# Patient Record
Sex: FEMALE | Race: WHITE | NOT HISPANIC OR LATINO | Employment: PART TIME | URBAN - METROPOLITAN AREA
[De-identification: names, ages, dates, MRNs, and addresses within clinical notes are randomized per-mention and may not be internally consistent; named-entity substitution may affect disease eponyms.]

---

## 2017-01-17 ENCOUNTER — ALLSCRIPTS OFFICE VISIT (OUTPATIENT)
Dept: OTHER | Facility: OTHER | Age: 52
End: 2017-01-17

## 2017-01-17 DIAGNOSIS — M76.60 ACHILLES TENDINITIS: ICD-10-CM

## 2017-01-17 DIAGNOSIS — M25.511 PAIN IN RIGHT SHOULDER: ICD-10-CM

## 2017-01-18 ENCOUNTER — TRANSCRIBE ORDERS (OUTPATIENT)
Dept: ADMINISTRATIVE | Facility: HOSPITAL | Age: 52
End: 2017-01-18

## 2017-01-18 ENCOUNTER — HOSPITAL ENCOUNTER (OUTPATIENT)
Dept: RADIOLOGY | Facility: MEDICAL CENTER | Age: 52
Discharge: HOME | End: 2017-01-18
Payer: COMMERCIAL

## 2017-01-18 DIAGNOSIS — M76.60 ACHILLES TENDINITIS: ICD-10-CM

## 2017-01-18 DIAGNOSIS — M25.511 PAIN IN RIGHT SHOULDER: ICD-10-CM

## 2017-01-18 PROCEDURE — 73030 X-RAY EXAM OF SHOULDER: CPT

## 2017-02-14 ENCOUNTER — APPOINTMENT (OUTPATIENT)
Dept: PHYSICAL THERAPY | Facility: MEDICAL CENTER | Age: 52
End: 2017-02-14
Payer: COMMERCIAL

## 2017-02-14 ENCOUNTER — ALLSCRIPTS OFFICE VISIT (OUTPATIENT)
Dept: OTHER | Facility: OTHER | Age: 52
End: 2017-02-14

## 2017-02-14 DIAGNOSIS — M25.511 PAIN IN RIGHT SHOULDER: ICD-10-CM

## 2017-02-14 DIAGNOSIS — M76.60 ACHILLES TENDINITIS: ICD-10-CM

## 2017-02-14 PROCEDURE — 97161 PT EVAL LOW COMPLEX 20 MIN: CPT

## 2017-02-21 ENCOUNTER — APPOINTMENT (OUTPATIENT)
Dept: PHYSICAL THERAPY | Facility: MEDICAL CENTER | Age: 52
End: 2017-02-21
Payer: COMMERCIAL

## 2017-02-21 PROCEDURE — 97110 THERAPEUTIC EXERCISES: CPT

## 2017-02-21 PROCEDURE — 97140 MANUAL THERAPY 1/> REGIONS: CPT

## 2017-02-22 ENCOUNTER — ALLSCRIPTS OFFICE VISIT (OUTPATIENT)
Dept: OTHER | Facility: OTHER | Age: 52
End: 2017-02-22

## 2017-02-23 ENCOUNTER — APPOINTMENT (OUTPATIENT)
Dept: PHYSICAL THERAPY | Facility: MEDICAL CENTER | Age: 52
End: 2017-02-23
Payer: COMMERCIAL

## 2017-02-23 PROCEDURE — 97110 THERAPEUTIC EXERCISES: CPT

## 2017-02-23 PROCEDURE — 97140 MANUAL THERAPY 1/> REGIONS: CPT

## 2017-02-28 ENCOUNTER — APPOINTMENT (OUTPATIENT)
Dept: PHYSICAL THERAPY | Facility: MEDICAL CENTER | Age: 52
End: 2017-02-28
Payer: COMMERCIAL

## 2017-02-28 PROCEDURE — 97110 THERAPEUTIC EXERCISES: CPT

## 2017-02-28 PROCEDURE — 97140 MANUAL THERAPY 1/> REGIONS: CPT

## 2017-03-02 ENCOUNTER — APPOINTMENT (OUTPATIENT)
Dept: PHYSICAL THERAPY | Facility: MEDICAL CENTER | Age: 52
End: 2017-03-02
Payer: COMMERCIAL

## 2017-03-02 PROCEDURE — 97140 MANUAL THERAPY 1/> REGIONS: CPT

## 2017-03-02 PROCEDURE — 97110 THERAPEUTIC EXERCISES: CPT

## 2017-03-07 ENCOUNTER — APPOINTMENT (OUTPATIENT)
Dept: PHYSICAL THERAPY | Facility: MEDICAL CENTER | Age: 52
End: 2017-03-07
Payer: COMMERCIAL

## 2017-03-07 PROCEDURE — 97110 THERAPEUTIC EXERCISES: CPT

## 2017-03-07 PROCEDURE — 97140 MANUAL THERAPY 1/> REGIONS: CPT

## 2017-03-09 ENCOUNTER — APPOINTMENT (OUTPATIENT)
Dept: PHYSICAL THERAPY | Facility: MEDICAL CENTER | Age: 52
End: 2017-03-09
Payer: COMMERCIAL

## 2017-03-09 PROCEDURE — 97140 MANUAL THERAPY 1/> REGIONS: CPT

## 2017-03-14 ENCOUNTER — APPOINTMENT (OUTPATIENT)
Dept: PHYSICAL THERAPY | Facility: MEDICAL CENTER | Age: 52
End: 2017-03-14
Payer: COMMERCIAL

## 2017-03-16 ENCOUNTER — APPOINTMENT (OUTPATIENT)
Dept: PHYSICAL THERAPY | Facility: MEDICAL CENTER | Age: 52
End: 2017-03-16
Payer: COMMERCIAL

## 2017-03-16 PROCEDURE — 97140 MANUAL THERAPY 1/> REGIONS: CPT

## 2017-03-16 PROCEDURE — 97110 THERAPEUTIC EXERCISES: CPT

## 2017-03-21 ENCOUNTER — APPOINTMENT (OUTPATIENT)
Dept: PHYSICAL THERAPY | Facility: MEDICAL CENTER | Age: 52
End: 2017-03-21
Payer: COMMERCIAL

## 2017-03-21 PROCEDURE — 97140 MANUAL THERAPY 1/> REGIONS: CPT

## 2017-03-21 PROCEDURE — 97110 THERAPEUTIC EXERCISES: CPT

## 2017-03-23 ENCOUNTER — APPOINTMENT (OUTPATIENT)
Dept: PHYSICAL THERAPY | Facility: MEDICAL CENTER | Age: 52
End: 2017-03-23
Payer: COMMERCIAL

## 2017-03-23 PROCEDURE — 97140 MANUAL THERAPY 1/> REGIONS: CPT

## 2017-03-23 PROCEDURE — 97110 THERAPEUTIC EXERCISES: CPT

## 2017-03-28 ENCOUNTER — APPOINTMENT (OUTPATIENT)
Dept: PHYSICAL THERAPY | Facility: MEDICAL CENTER | Age: 52
End: 2017-03-28
Payer: COMMERCIAL

## 2017-03-28 PROCEDURE — 97140 MANUAL THERAPY 1/> REGIONS: CPT

## 2017-03-28 PROCEDURE — 97110 THERAPEUTIC EXERCISES: CPT

## 2017-03-30 ENCOUNTER — APPOINTMENT (OUTPATIENT)
Dept: PHYSICAL THERAPY | Facility: MEDICAL CENTER | Age: 52
End: 2017-03-30
Payer: COMMERCIAL

## 2017-03-30 PROCEDURE — 97140 MANUAL THERAPY 1/> REGIONS: CPT

## 2017-03-30 PROCEDURE — 97110 THERAPEUTIC EXERCISES: CPT

## 2017-04-04 ENCOUNTER — ALLSCRIPTS OFFICE VISIT (OUTPATIENT)
Dept: OTHER | Facility: OTHER | Age: 52
End: 2017-04-04

## 2017-04-04 ENCOUNTER — TRANSCRIBE ORDERS (OUTPATIENT)
Dept: ADMINISTRATIVE | Facility: HOSPITAL | Age: 52
End: 2017-04-04

## 2017-04-04 DIAGNOSIS — M75.02 ADHESIVE BURSITIS OF LEFT SHOULDER: Primary | ICD-10-CM

## 2017-05-24 ENCOUNTER — ALLSCRIPTS OFFICE VISIT (OUTPATIENT)
Dept: OTHER | Facility: OTHER | Age: 52
End: 2017-05-24

## 2017-05-24 ENCOUNTER — TRANSCRIBE ORDERS (OUTPATIENT)
Dept: ADMINISTRATIVE | Facility: HOSPITAL | Age: 52
End: 2017-05-24

## 2017-05-24 DIAGNOSIS — M54.12 CERVICAL NEURALGIA: Primary | ICD-10-CM

## 2017-05-24 DIAGNOSIS — M54.12 RADICULOPATHY OF CERVICAL REGION: ICD-10-CM

## 2017-05-31 ENCOUNTER — HOSPITAL ENCOUNTER (OUTPATIENT)
Dept: MRI IMAGING | Facility: HOSPITAL | Age: 52
Discharge: HOME | End: 2017-05-31
Attending: ORTHOPAEDIC SURGERY
Payer: COMMERCIAL

## 2017-05-31 DIAGNOSIS — M54.12 CERVICAL NEURALGIA: ICD-10-CM

## 2017-05-31 PROCEDURE — 72141 MRI NECK SPINE W/O DYE: CPT

## 2017-06-07 ENCOUNTER — ALLSCRIPTS OFFICE VISIT (OUTPATIENT)
Dept: OTHER | Facility: OTHER | Age: 52
End: 2017-06-07

## 2017-08-09 ENCOUNTER — ALLSCRIPTS OFFICE VISIT (OUTPATIENT)
Dept: OTHER | Facility: OTHER | Age: 52
End: 2017-08-09

## 2017-09-05 ENCOUNTER — ALLSCRIPTS OFFICE VISIT (OUTPATIENT)
Dept: OTHER | Facility: OTHER | Age: 52
End: 2017-09-05

## 2017-09-20 ENCOUNTER — ALLSCRIPTS OFFICE VISIT (OUTPATIENT)
Dept: RADIOLOGY | Facility: MEDICAL CENTER | Age: 52
End: 2017-09-20
Payer: COMMERCIAL

## 2017-09-21 ENCOUNTER — GENERIC CONVERSION - ENCOUNTER (OUTPATIENT)
Dept: OTHER | Facility: OTHER | Age: 52
End: 2017-09-21

## 2017-09-29 ENCOUNTER — GENERIC CONVERSION - ENCOUNTER (OUTPATIENT)
Dept: OTHER | Facility: OTHER | Age: 52
End: 2017-09-29

## 2017-10-11 ENCOUNTER — GENERIC CONVERSION - ENCOUNTER (OUTPATIENT)
Dept: OTHER | Facility: OTHER | Age: 52
End: 2017-10-11

## 2017-10-11 DIAGNOSIS — M47.816 SPONDYLOSIS OF LUMBAR REGION WITHOUT MYELOPATHY OR RADICULOPATHY: ICD-10-CM

## 2017-10-11 DIAGNOSIS — M54.50 LOW BACK PAIN: ICD-10-CM

## 2017-10-24 ENCOUNTER — APPOINTMENT (OUTPATIENT)
Dept: PHYSICAL THERAPY | Facility: MEDICAL CENTER | Age: 52
End: 2017-10-24
Payer: COMMERCIAL

## 2017-10-24 ENCOUNTER — DOCTOR'S OFFICE (OUTPATIENT)
Dept: URBAN - METROPOLITAN AREA CLINIC 136 | Facility: CLINIC | Age: 52
Setting detail: OPHTHALMOLOGY
End: 2017-10-24
Payer: COMMERCIAL

## 2017-10-24 ENCOUNTER — GENERIC CONVERSION - ENCOUNTER (OUTPATIENT)
Dept: OTHER | Facility: OTHER | Age: 52
End: 2017-10-24

## 2017-10-24 ENCOUNTER — OPTICAL OFFICE (OUTPATIENT)
Dept: URBAN - METROPOLITAN AREA CLINIC 143 | Facility: CLINIC | Age: 52
Setting detail: OPHTHALMOLOGY
End: 2017-10-24
Payer: COMMERCIAL

## 2017-10-24 DIAGNOSIS — H52.4: ICD-10-CM

## 2017-10-24 DIAGNOSIS — M47.816 SPONDYLOSIS OF LUMBAR REGION WITHOUT MYELOPATHY OR RADICULOPATHY: ICD-10-CM

## 2017-10-24 DIAGNOSIS — H52.223: ICD-10-CM

## 2017-10-24 DIAGNOSIS — M54.50 LOW BACK PAIN: ICD-10-CM

## 2017-10-24 DIAGNOSIS — H52.03: ICD-10-CM

## 2017-10-24 PROCEDURE — 97162 PT EVAL MOD COMPLEX 30 MIN: CPT

## 2017-10-24 PROCEDURE — G8978 MOBILITY CURRENT STATUS: HCPCS

## 2017-10-24 PROCEDURE — 92004 COMPRE OPH EXAM NEW PT 1/>: CPT | Performed by: OPTOMETRIST

## 2017-10-24 PROCEDURE — V2744 TINT PHOTOCHROMATIC LENS/ES: HCPCS | Performed by: OPTOMETRIST

## 2017-10-24 PROCEDURE — V2781 PROGRESSIVE LENS PER LENS: HCPCS | Performed by: OPTOMETRIST

## 2017-10-24 PROCEDURE — V2020 VISION SVCS FRAMES PURCHASES: HCPCS | Performed by: OPTOMETRIST

## 2017-10-24 PROCEDURE — G8979 MOBILITY GOAL STATUS: HCPCS

## 2017-10-24 PROCEDURE — V2203 LENS SPHCYL BIFOCAL 4.00D/.1: HCPCS | Performed by: OPTOMETRIST

## 2017-10-24 ASSESSMENT — REFRACTION_MANIFEST
OS_VA1: 20/
OD_VA2: 20/
OS_VA3: 20/
OD_VA1: 20/
OS_VA2: 20/
OU_VA: 20/
OD_VA2: 20/
OU_VA: 20/
OD_VA1: 20/
OS_VA3: 20/
OS_VA2: 20/
OD_VA3: 20/
OD_VA3: 20/
OS_VA1: 20/

## 2017-10-24 ASSESSMENT — REFRACTION_OUTSIDERX
OS_CYLINDER: -0.50
OS_SPHERE: +1.00
OD_AXIS: 090
OD_ADD: +2.50
OS_VA2: 20/40
OS_VA1: 20/40-2
OS_VA3: 20/
OD_VA3: 20/
OU_VA: 20/30-1
OD_CYLINDER: -0.50
OD_VA2: 20/40
OD_VA1: 20/30-1
OS_ADD: +2.50
OD_SPHERE: +0.75
OS_AXIS: 105

## 2017-10-24 ASSESSMENT — KERATOMETRY
OS_K1POWER_DIOPTERS: 46.75
OS_K2POWER_DIOPTERS: 47.00
OD_K1POWER_DIOPTERS: 46.50
OD_K2POWER_DIOPTERS: 47.25
OD_AXISANGLE_DEGREES: 105
OS_AXISANGLE_DEGREES: 158

## 2017-10-24 ASSESSMENT — SPHEQUIV_DERIVED
OS_SPHEQUIV: 0.625
OD_SPHEQUIV: 0.5

## 2017-10-24 ASSESSMENT — REFRACTION_AUTOREFRACTION
OS_AXIS: 107
OD_SPHERE: +0.75
OS_CYLINDER: -0.75
OD_AXIS: 087
OS_SPHERE: +1.00
OD_CYLINDER: -0.50

## 2017-10-24 ASSESSMENT — REFRACTION_CURRENTRX
OS_OVR_VA: 20/
OD_OVR_VA: 20/
OD_OVR_VA: 20/
OS_OVR_VA: 20/
OD_OVR_VA: 20/
OS_OVR_VA: 20/

## 2017-10-24 ASSESSMENT — CONFRONTATIONAL VISUAL FIELD TEST (CVF)
OD_FINDINGS: FULL
OS_FINDINGS: FULL

## 2017-10-24 ASSESSMENT — AXIALLENGTH_DERIVED
OS_AL: 22.1965
OD_AL: 22.2397

## 2017-10-24 ASSESSMENT — VISUAL ACUITY
OS_BCVA: 20/50
OD_BCVA: 20/100

## 2017-10-25 NOTE — CONSULTS
Assessment  Assessed    1  Acute cervical radiculopathy (723 4) (M54 12)    Plan  Abnormal blood chemistry, Acute cervical radiculopathy    · Cervical Interlaminar Epidural Steroid Injection; Status:Active; Requested RCR:[id number];     Perform:[facility]; Order Comments:Right EHSAN; TVU:[id number]; Ordered; For:Abnormal blood chemistry, Acute cervical radiculopathy; Ordered By:Ivan Pruitt; Discussion/Summary    Plan:we'll schedule patient for a right C7-T1 interlaminar epidural steroid injection to decrease the inflammatory component of her pain which is in the neck and radiating into the right arm   risks and benefits including bleeding, infection, tissue reaction, allergic reaction were discussed  Verbal and written consent were obtained  follow-up as needed, continue with plan to follow up with Dr Zaria Ziegler  Chief Complaint  Chief Complaints    1  Neck Pain  neck and right arm pain      History of Present Illness  Ms Dejesus is a pleasant 39-year-old female sent from Dr Zaria Ziegler for consideration of cervical epidural steroid injection for her neck and radiating arm pain consistent with acute cervical radiculitis  is been going on for number months or her without any inciting event or trauma and ranges from moderate to severe intensity rated as an 8-10/10  This is nearly constant without any typical pattern  factors include coughing and sneezing  Alleviating factors include prayer  has tried chiropractic manipulation without relief   studies include MRI of the cervical spine  This does reveal some degenerative changes which may be accounting for her symptoms  denies any allergies to contrast dyes, local anesthetics, steroid preparations, or latex  The patient is not taking any anticoagulant medications  No contraindications to pursuing injection therapies    have personally reviewed and/or updated the patient's past medical history, past surgical history, family history, social history, current medications, allergies, and vital signs today  Referring physician is  Dr Guero Graff   Primary Care physician is  Dr Nica Gallegos presents with complaints of gradual onset of constant episodes of severe right lateral neck pain, described as dull and aching, radiating to the right trapezius, right shoulder and right arm  On a scale of 1 to 10, the patient rates the pain as 9  Review of Systems    Constitutional: no fever,-- no recent weight gain-- and-- no recent weight loss  Eyes: no double vision-- and-- no blurry vision  Cardiovascular: no chest pain,-- no palpitations-- and-- no lower extremity edema  Respiratory: no complaints of shortness of breath-- and-- no wheezing  Musculoskeletal: no difficulty walking,-- no muscle weakness,-- no joint stiffness,-- no joint swelling,-- no limb swelling,-- no pain in extremity-- and-- no decreased range of motion  Neurological: no dizziness,-- no difficulty swallowing,-- no memory loss,-- no loss of consciousness-- and-- no seizures  Gastrointestinal: no nausea,-- no vomiting,-- no constipation-- and-- no diarrhea  Genitourinary: no difficulty initiating urine stream,-- no genital pain-- and-- no frequent urination  Integumentary: no complaints of skin rash  Psychiatric: no depression  Endocrine: no excessive thirst,-- no adrenal disease,-- no hypothyroidism-- and-- no hyperthyroidism  Hematologic/Lymphatic: no tendency for easy bruising-- and-- no tendency for easy bleeding  Active Problems  Problems    1  Abnormal blood chemistry (790 6) (R79 9)   2  Achilles tendon pain (727 89) (M76 60)   3  Acute cervical radiculopathy (723 4) (M54 12)   4  Adhesive capsulitis of left shoulder (726 0) (M75 02)   5  Ankle pain, unspecified laterality   6  Burn of hand (944 00) (T23 009A)   7  Cellulitis of left axilla (682 3) (L03 112)   8  Cervical neuralgia (723 4) (M54 12)   9  Chest pain (786 50) (R07 9)   10   Closed Fracture Of The 12th Rib (807 00)   11  Cough (786 2) (R05)   12  Ecchymoses, spontaneous (782 7) (R23 3)   13  Encounter for routine gynecological examination with Papanicolaou smear of cervix    (V72 31,V76 2) (Z01 419)   14  Encounter for screening mammogram for malignant neoplasm of breast (V76 12)    (Z12 31)   15  Epilepsy And Recurrent Seizures (345 90)   16  Esophageal reflux (530 81) (K21 9)   17  Fatigue (780 79) (R53 83)   18  Foot Sprain (845 10)   19  Gastritis (535 50) (K29 70)   20  History of allergy (V15 09) (Z88 9)   21  Hyperlipidemia (272 4) (E78 5)   22  Knee pain (719 46) (M25 569)   23  Laceration of right hand, initial encounter (882 0) (S61 411A)   24  Lateral epicondylitis of left elbow (726 32) (M77 12)   25  Lower back pain (724 2) (M54 5)   26  PPD screening test (V74 1) (Z11 1)   27  Right shoulder pain (719 41) (M25 511)   28  Seizures (780 39) (R56 9)   29  Spondylosis of cervical region without myelopathy or radiculopathy (721 0) (M47 812)   30  Tendinitis of left rotator cuff (726 10) (M75 82)   31  Thumb laceration (883 0) (S61 019A)   32  Trigger middle finger of left hand (727 03) (M65 332)   33  URTI (acute upper respiratory infection) (465 9) (J06 9)   34  Viral labyrinthitis of left ear (386 35) (H83 02)   35  Viral labyrinthitis of right ear (386 35) (H83 01)   36  Visit for suture removal (V58 32) (Z48 02)    Past Medical History  Problems    1  History of Ankle Joint Swelling   2  History of Epilepsy And Recurrent Seizures (345 90)   3  History of abdominal pain (V13 89) (Z87 898)   4  History of acute bronchitis (V12 69) (Z87 09)   5  History of acute sinusitis (V12 69) (Z87 09)   6  History of anemia (V12 3) (Z86 2)   7  History of chest pain (V13 89) (Z87 898)   8  History of constipation (V12 79) (Z87 19)   9  History of diarrhea (V12 79) (Z87 898)   10  History of headache (V13 89) (Z87 898)   11  History of low back pain (V13 59) (Z87 39)   12   History of sexually transmitted disease (V12 09) (Z86 19)   13  History of shortness of breath (V13 89) (Z87 898)   14  History of vertigo (V12 49) (Z87 898)   15  History of Pneumonia (V12 61)    Surgical History  Problems    1  History of Cholecystectomy Laparoscopic   2  History of Cholecystotomy   3  History of Surgical Treatment For    4  History of Tubal Ligation   5  History of Tubal Ligation    Family History  Mother    1  Family history of Carcinoma In Situ   2  Family history of Diabetes Mellitus (V18 0)   3  Family history of Hypertension (V17 49)   4  Family history of Stroke Syndrome (V17 1)  Family History    5  Family history of Brain Cancer (V16 8)    Social History  Problems    · Former smoker (T33 61) (J19 617)   · Lives with significant other   · Never A Smoker   · No alcohol use   · No illicit drug use   ·  (V61 03) (Z63 5)   · Working Full Time    Current Meds   1  B Complex Oral Capsule; take 1 capsule daily; Therapy: 47UDD0291 to Recorded   2  Lecithin Granules; USE AS DIRECTED; Therapy: 39UDB6024 to Recorded   3  MethylPREDNISolone Acetate 40 MG/ML Injection Suspension; bursa injection; To Be   Done: 65WHI0897; Status: HOLD FOR - Administration Ordered   4  Vitamin E 1000 UNIT Oral Capsule; take 1 capsule daily; Therapy: 40QCJ3803 to Recorded    Allergies  Medication    1  Codeine Derivatives   2  Penicillins  Non-Medication    3  Milk   4  No Known Environmental Allergies    Vitals  Vital Signs    Recorded: 2017 09:19AM   Heart Rate 64   Respiration 12   Systolic 045   Diastolic 62   Height 4 ft 11 in   Weight 194 lb    BMI Calculated 39 18   BSA Calculated 1 82   Pain Scale 9     Physical Exam    Constitutional   General appearance: Well developed, well nourished, alert, in no distress, non-toxic and no overt pain behavior  Eyes   Sclera: anicteric   HEENT   Hearing grossly intact  Pulmonary   Respiratory effort: Even and unlabored        Cardiovascular   Examination of extremities: No edema or pitting edema present  Skin   Skin and subcutaneous tissue: Normal without rashes or lesions, well hydrated  Psychiatric   Mood and affect: Mood and affect appropriate  Neurologic   Cranial nerves: Cranial nerves II-XII grossly intact  Musculoskeletal   Gait and station: Normal     Cervical Spine examination demonstrates Cervical Spine:   Tenderness: right paraspinal tenderness-- and-- right trapezius muscle  Flexion was restricted-- and-- was painful  Extension was restricted-- and-- was painful  Left lateral flexion was restricted-- and-- was painful  Right lateral flexion was restricted-- and-- was painful  Rotation to the left was restricted-- and-- was painful  Rotation to the right was restricted-- and-- was painful    hand strength was normal bilaterally  wrist strength was normal bilaterally  elbow strength was normal bilaterally  shoulder strength was normal bilaterally  Evaluation of Muscle Stretch Reflexes on the right side demonstrates 1/4 Triceps Reflex,-- 1/4 Biceps Reflex-- and-- 1/4 Brachioradialis Reflex  Evaluation of Muscle Stretch Reflexes on the left side demonstrates 1/4 Triceps Reflex,-- 1/4 Biceps Reflex-- and-- 1/4 Brachioradialis Reflex  Special Tests: negative Spurling's Maneuver to the right  Results/Data  Procedure Flowsheet [id number] 09:30AM      Test Name Result Flag Reference   Neck Disability Index Score 54         Results    I personally reviewed the films/images in the office today  Radiology:  RIGHT SHOULDER    INDICATION: Right shoulder pain  COMPARISON: None    VIEWS: 3; 3 images    FINDINGS:    There is no acute fracture or dislocation  No degenerative changes  No lytic or blastic lesions are seen  Soft tissues are unremarkable  IMPRESSION:    No acute osseous abnormality  MRI:   MRI CERVICAL SPINE WITHOUT CONTRAST    INDICATION: 723 4 m54 12 History: severe rt shoulder pain, weakness and decreased range of motion    COMPARISON: CT 9/8/2016    TECHNIQUE: Sagittal T1, sagittal T2, sagittal inversion recovery, axial T2, axial 2D merge    IMAGE QUALITY: Diagnostic    FINDINGS:    ALIGNMENT: There is nonspecific straightening of the cervical lordosis without subluxation  MARROW SIGNAL: Scattered degenerative endplate changes  No focally suspicious marrow lesions  No bone marrow edema or compression abnormality  CERVICAL AND VISUALIZED THORACIC CORD: Normal signal within the visualized cord  PREVERTEBRAL AND PARASPINAL SOFT TISSUES: Prevertebral and paraspinal soft tissues are unremarkable  VISUALIZED POSTERIOR FOSSA: The visualized posterior fossa demonstrates no abnormal signal     CERVICAL DISC SPACES:    C2-C3: Normal     C3-C4: Normal     C4-C5: There is a diffuse disk bulge  No significant central canal or neural foraminal narrowing  C5-C6: There is a central disc herniation, protrusion type  There is mild central canal right neural foraminal narrowing  Left neural foramen patent  C6-C7: There is a left paracentral disc herniation, protrusion type  Mild central canal narrowing  Neural foramina bilaterally patent  C7-T1: Normal     UPPER THORACIC DISC SPACES: Normal       IMPRESSION:      1  There is nonspecific straightening of the cervical lordosis without subluxation  * MRI CERVICAL SPINE WO CONTRAST [id number] 10:22PM Elise Rosado     Test Name Result Flag Reference   MRI CERVICAL SPINE WO CONTRAST (Report)     MRI CERVICAL SPINE WITHOUT CONTRAST     INDICATION: 723 4 m54 12 History: severe rt shoulder pain, weakness and decreased range of motion     COMPARISON: CT 9/8/2016     TECHNIQUE: Sagittal T1, sagittal T2, sagittal inversion recovery, axial T2, axial 2D merge        IMAGE QUALITY: Diagnostic     FINDINGS:     ALIGNMENT: There is nonspecific straightening of the cervical lordosis without subluxation  MARROW SIGNAL: Scattered degenerative endplate changes   No focally suspicious marrow lesions  No bone marrow edema or compression abnormality  CERVICAL AND VISUALIZED THORACIC CORD: Normal signal within the visualized cord  PREVERTEBRAL AND PARASPINAL SOFT TISSUES: Prevertebral and paraspinal soft tissues are unremarkable  VISUALIZED POSTERIOR FOSSA: The visualized posterior fossa demonstrates no abnormal signal      CERVICAL DISC SPACES:        C2-C3: Normal      C3-C4: Normal      C4-C5: There is a diffuse disk bulge  No significant central canal or neural foraminal narrowing  C5-C6: There is a central disc herniation, protrusion type  There is mild central canal right neural foraminal narrowing  Left neural foramen patent  C6-C7: There is a left paracentral disc herniation, protrusion type  Mild central canal narrowing  Neural foramina bilaterally patent  C7-T1: Normal      UPPER THORACIC DISC SPACES: Normal        IMPRESSION:       1  There is nonspecific straightening of the cervical lordosis without subluxation  2  Mild spondylosis  Workstation performed: [id number]     Signed by: Mayra Leahy MD   6/1/17     Future Appointments    Date/Time Provider Specialty Site   10/11/2017 09:10 AM Byron Kawasaki, M D   Orthopedic Surgery [facility]     Signatures   Electronically signed by : Heather Burt DO; Sep  5 2017  9:55AM EST                       (Author)

## 2017-10-31 ENCOUNTER — APPOINTMENT (OUTPATIENT)
Dept: PHYSICAL THERAPY | Facility: MEDICAL CENTER | Age: 52
End: 2017-10-31
Payer: COMMERCIAL

## 2017-11-03 ENCOUNTER — APPOINTMENT (OUTPATIENT)
Dept: PHYSICAL THERAPY | Facility: MEDICAL CENTER | Age: 52
End: 2017-11-03
Payer: COMMERCIAL

## 2017-11-07 ENCOUNTER — APPOINTMENT (OUTPATIENT)
Dept: PHYSICAL THERAPY | Facility: MEDICAL CENTER | Age: 52
End: 2017-11-07
Payer: COMMERCIAL

## 2017-11-07 PROCEDURE — 97110 THERAPEUTIC EXERCISES: CPT

## 2017-11-09 ENCOUNTER — APPOINTMENT (OUTPATIENT)
Dept: PHYSICAL THERAPY | Facility: MEDICAL CENTER | Age: 52
End: 2017-11-09
Payer: COMMERCIAL

## 2017-11-09 PROCEDURE — 97110 THERAPEUTIC EXERCISES: CPT

## 2017-11-09 PROCEDURE — 97140 MANUAL THERAPY 1/> REGIONS: CPT

## 2017-11-14 ENCOUNTER — APPOINTMENT (OUTPATIENT)
Dept: PHYSICAL THERAPY | Facility: MEDICAL CENTER | Age: 52
End: 2017-11-14
Payer: COMMERCIAL

## 2017-11-14 ENCOUNTER — ALLSCRIPTS OFFICE VISIT (OUTPATIENT)
Dept: OTHER | Facility: OTHER | Age: 52
End: 2017-11-14

## 2017-11-14 PROCEDURE — 97110 THERAPEUTIC EXERCISES: CPT

## 2017-11-15 NOTE — PROGRESS NOTES
Assessment    1  Myofascial pain syndrome (729 1) (M79 1)    Plan  Adhesive capsulitis of left shoulder    · MethylPREDNISolone Acetate 40 MG/ML Injection Suspension; bursainjection   Rx By: Diego oRjas; Adhesive capsulitis of left shoulder; FROYLAN = N; Request Administration; Last Updated By: Abilio Sharma; 11/13/2017 11:23:27 AM  Myofascial pain syndrome    · 2 - Sandhya Bianchi MD, Shruthi Dorsey  (Rheumatology) Co-Management  *  Status: Hold For -Scheduling  Requested for: [id number]   Ordered; For: Myofascial pain syndrome; Ordered By: Sara Nieves Performed:  Due: 14ZEG0238  Care Summary provided  : Yes    Discussion/Summary    At this time we will refer the patient to Rheumatology for evaluation refer widespread myofascial pain complaints  I want to ensure that she does not have some other type of diagnosis to account for her symptoms other than fibromyalgia  She is welcome to return to our office for further interventional care if needed  Chief Complaint    1  Back Pain  widespread myofascial pain      History of Present Illness  Ms Dejesus returns for further evaluation of her widespread pain complaints  She does have some significant myofascial pain in the upper and lower regions of her body  She is describing intermittent burning, aching, throbbing, pressure-like, numbness type pain  she does not have any focal pain that would respond to any interventional approaches  She did have some significant improvement with epidural steroid injections for her neck and arm pain   have personally reviewed and/or updated the patient's past medical history, past surgical history, family history, social history, current medications, allergies, and vital signs today  Osman Boogie presents with complaints of gradual onset of moderate left > right and bilateral lower back pain, described as dull, aching, burning and throbbing, radiating to the left buttock and left thigh  On a scale of 1 to 10, the patient rates the pain as 7  Symptoms are unchanged  Review of Systems   Constitutional: no fever,-- no recent weight gain-- and-- no recent weight loss  Eyes: no double vision-- and-- no blurry vision  Cardiovascular: no chest pain,-- no palpitations-- and-- no lower extremity edema  Respiratory: no complaints of shortness of breath-- and-- no wheezing  Musculoskeletal: no difficulty walking,-- no muscle weakness,-- no joint stiffness,-- no joint swelling,-- no limb swelling,-- no pain in extremity-- and-- no decreased range of motion  Neurological: seizures, but-- no dizziness,-- no difficulty swallowing,-- no memory loss-- and-- no loss of consciousness  Gastrointestinal: no nausea,-- no vomiting,-- no constipation-- and-- no diarrhea  Genitourinary: no difficulty initiating urine stream,-- no genital pain-- and-- no frequent urination  Integumentary: no complaints of skin rash  Psychiatric: no depression  Endocrine: no excessive thirst,-- no adrenal disease,-- no hypothyroidism-- and-- no hyperthyroidism  Hematologic/Lymphatic: no tendency for easy bruising-- and-- no tendency for easy bleeding  Active Problems    1  Abnormal blood chemistry (790 6) (R79 9)   2  Achilles tendon pain (727 89) (M76 60)   3  Acute cervical radiculopathy (723 4) (M54 12)   4  Adhesive capsulitis of left shoulder (726 0) (M75 02)   5  Ankle pain, unspecified laterality   6  Burn of hand (944 00) (T23 009A)   7  Cellulitis of left axilla (682 3) (L03 112)   8  Cervical neuralgia (723 4) (M54 12)   9  Chest pain (786 50) (R07 9)   10  Chronic right sacroiliac joint pain (724 6,338 29) (M53 3,G89 29)   11  Closed Fracture Of The 12th Rib (807 00)   12  Cough (786 2) (R05)   13  Ecchymoses, spontaneous (782 7) (R23 3)   14  Encounter for routine gynecological examination with Papanicolaou smear of cervix  (V72 31,V76 2) (Z01 419)   15   Encounter for screening mammogram for malignant neoplasm of breast (V76 12)  (Z12 31)   16  Epilepsy And Recurrent Seizures (345 90)   17  Esophageal reflux (530 81) (K21 9)   18  Fatigue (780 79) (R53 83)   19  Foot Sprain (845 10)   20  Gastritis (535 50) (K29 70)   21  History of allergy (V15 09) (Z88 9)   22  Hyperlipidemia (272 4) (E78 5)   23  Knee pain (719 46) (M25 569)   24  Laceration of right hand, initial encounter (882 0) (S61 411A)   25  Lateral epicondylitis of left elbow (726 32) (M77 12)   26  Lower back pain (724 2) (M54 5)   27  Lumbar spondylosis (721 3) (M47 816)   28  PPD screening test (V74 1) (Z11 1)   29  Right shoulder pain (719 41) (M25 511)   30  Seizures (780 39) (R56 9)   31  Spondylosis of cervical region without myelopathy or radiculopathy (721 0) (M47 812)   32  Tendinitis of left rotator cuff (726 10) (M75 82)   33  Thumb laceration (883 0) (S61 019A)   34  Trigger middle finger of left hand (727 03) (M65 332)   35  URTI (acute upper respiratory infection) (465 9) (J06 9)   36  Viral labyrinthitis of left ear (386 35) (H83 02)   37  Viral labyrinthitis of right ear (386 35) (H83 01)   38  Visit for suture removal (V58 32) (Z48 02)    Past Medical History  1  History of Ankle Joint Swelling   2  History of Epilepsy And Recurrent Seizures (345 90)   3  History of abdominal pain (V13 89) (Z87 898)   4  History of acute bronchitis (V12 69) (Z87 09)   5  History of acute sinusitis (V12 69) (Z87 09)   6  History of anemia (V12 3) (Z86 2)   7  History of chest pain (V13 89) (Z87 898)   8  History of constipation (V12 79) (Z87 19)   9  History of diarrhea (V12 79) (Z87 898)   10  History of headache (V13 89) (Z87 898)   11  History of low back pain (V13 59) (Z87 39)   12  History of sexually transmitted disease (V12 09) (Z86 19)   13  History of shortness of breath (V13 89) (Z87 898)   14  History of vertigo (V12 49) (Z87 898)   15  History of Pneumonia (V12 61)    Surgical History    1  History of Cholecystectomy Laparoscopic   2  History of Cholecystotomy   3   History of Surgical Treatment For    4  History of Tubal Ligation   5  History of Tubal Ligation    Family History  Mother    1  Family history of Carcinoma In Situ   2  Family history of Diabetes Mellitus (V18 0)   3  Family history of Hypertension (V17 49)   4  Family history of Stroke Syndrome (V17 1)  Family History    5  Family history of Brain Cancer (V16 8)    Social History     · Former smoker (V09 75) (J08 491)   · Lives with significant other   · Never A Smoker   · No alcohol use   · No illicit drug use   ·  (V61 03) (Z63 5)   · Working Full Time    Current Meds   1  B Complex Oral Capsule; take 1 capsule daily; Therapy: 66TBY4219 to Recorded   2  Lecithin Granules; USE AS DIRECTED; Therapy: 85KGE3139 to Recorded   3  MethylPREDNISolone Acetate 40 MG/ML Injection Suspension; bursa injection; To Be Done: 61QFX3330; Status: HOLD FOR - Administration Ordered   4  Topamax 100 MG Oral Tablet; Therapy: 73YPP0402 to Recorded   5  Vitamin E 1000 UNIT Oral Capsule; take 1 capsule daily; Therapy: 98TIG8256 to Recorded    Allergies  1  Codeine Derivatives   2  Penicillins  3  Milk   4  No Known Environmental Allergies    Vitals  Vital Signs    Recorded: 38FFO0382 02:40PM   Heart Rate 64   Respiration 12   Systolic 154   Diastolic 62   Height 4 ft 11 in   Weight 194 lb    BMI Calculated 39 18   BSA Calculated 1 82   Pain Scale 7       Physical Exam   Constitutional  General appearance: Well developed, well nourished, alert, in no distress, non-toxic and no overt pain behavior  Eyes  Sclera: anicteric  HEENT  Hearing grossly intact  Pulmonary  Respiratory effort: Even and unlabored  Psychiatric  Mood and affect: Mood and affect appropriate  Neurologic  Cranial nerves: Cranial nerves II-XII grossly intact     Musculoskeletal  Gait and station: Normal  -- widespread myofascial pain is demonstrated on exam of the upper extremities, lower extremities, and throughout the paraspinal musculature from the cervical to sacral regions        Signatures   Electronically signed by : Reg Lopez DO; Nov 14 2017  3:04PM EST                       (Author)

## 2018-01-02 ENCOUNTER — ALLSCRIPTS OFFICE VISIT (OUTPATIENT)
Dept: OTHER | Facility: OTHER | Age: 53
End: 2018-01-02

## 2018-01-02 ENCOUNTER — APPOINTMENT (OUTPATIENT)
Dept: LAB | Facility: HOSPITAL | Age: 53
End: 2018-01-02
Payer: COMMERCIAL

## 2018-01-02 DIAGNOSIS — J01.10 ACUTE FRONTAL SINUSITIS: ICD-10-CM

## 2018-01-02 DIAGNOSIS — R32 URINARY INCONTINENCE: ICD-10-CM

## 2018-01-02 DIAGNOSIS — R30.0 DYSURIA: ICD-10-CM

## 2018-01-02 LAB
BACTERIA UR QL AUTO: NORMAL /HPF
BILIRUB UR QL STRIP: NEGATIVE
BILIRUB UR QL STRIP: NORMAL
CLARITY UR: CLEAR
CLARITY UR: NORMAL
COLOR UR: CLEAR
COLOR UR: YELLOW
GLUCOSE (HISTORICAL): NORMAL
GLUCOSE UR STRIP-MCNC: NEGATIVE MG/DL
HGB UR QL STRIP.AUTO: NEGATIVE
HGB UR QL STRIP.AUTO: NORMAL
HYALINE CASTS #/AREA URNS LPF: NORMAL /LPF
KETONES UR STRIP-MCNC: NEGATIVE MG/DL
KETONES UR STRIP-MCNC: NORMAL MG/DL
LEUKOCYTE ESTERASE UR QL STRIP: ABNORMAL
LEUKOCYTE ESTERASE UR QL STRIP: NORMAL
NITRITE UR QL STRIP: NEGATIVE
NITRITE UR QL STRIP: NORMAL
NON-SQ EPI CELLS URNS QL MICRO: NORMAL /HPF
PH UR STRIP.AUTO: 6 [PH]
PH UR STRIP.AUTO: 6.5 [PH] (ref 4.5–8)
PROT UR STRIP-MCNC: NEGATIVE MG/DL
PROT UR STRIP-MCNC: NORMAL MG/DL
RBC #/AREA URNS AUTO: NORMAL /HPF
SP GR UR STRIP.AUTO: 1.01
SP GR UR STRIP.AUTO: 1.01 (ref 1–1.03)
UROBILINOGEN UR QL STRIP.AUTO: 0.2 E.U./DL
UROBILINOGEN UR QL STRIP.AUTO: NORMAL
WBC #/AREA URNS AUTO: NORMAL /HPF

## 2018-01-02 PROCEDURE — 87086 URINE CULTURE/COLONY COUNT: CPT

## 2018-01-02 PROCEDURE — 81001 URINALYSIS AUTO W/SCOPE: CPT

## 2018-01-03 ENCOUNTER — GENERIC CONVERSION - ENCOUNTER (OUTPATIENT)
Dept: OTHER | Facility: OTHER | Age: 53
End: 2018-01-03

## 2018-01-03 LAB
BACTERIA UR CULT: ABNORMAL
BACTERIA UR CULT: ABNORMAL

## 2018-01-04 ENCOUNTER — GENERIC CONVERSION - ENCOUNTER (OUTPATIENT)
Dept: OTHER | Facility: OTHER | Age: 53
End: 2018-01-04

## 2018-01-10 NOTE — MISCELLANEOUS
Message   Recorded as Task   Date: 09/20/2017 03:42 PM, Created By: Yoan Mena   Task Name: Miscellaneous   Assigned To: [id number] [address] clinical,Team   Regarding Patient: Maurice Horn, Status: Active   Comment:    Marya Hagen - 20 Sep 2017 3:42 PM     TASK CREATED  pt had procedure done today and requesting to speak with nurse  please call pt back at [phone]   Marta Juan - 20 Sep 2017 4:19 PM     TASK EDITED  *****Lisa Best**********    S/W pt  Pt stated has a slight headache and feels a little dizzy  Pt denies any fever, or nausea  Advised pt to use some ice, rest and take whatever she normally takes for pain  Christy Abdullahi - 20 Sep 2017 4:48 PM     TASK REPLIED TO: Previously Assigned To Christy Abdullahi             aware, agree with Mann Guadarrama - 21 Sep 2017 8:18 AM     TASK EDITED        Active Problems    1  Abnormal blood chemistry (790 6) (R79 9)   2  Achilles tendon pain (727 89) (M76 60)   3  Acute cervical radiculopathy (723 4) (M54 12)   4  Adhesive capsulitis of left shoulder (726 0) (M75 02)   5  Ankle pain, unspecified laterality   6  Burn of hand (944 00) (T23 009A)   7  Cellulitis of left axilla (682 3) (L03 112)   8  Cervical neuralgia (723 4) (M54 12)   9  Chest pain (786 50) (R07 9)   10  Closed Fracture Of The 12th Rib (807 00)   11  Cough (786 2) (R05)   12  Ecchymoses, spontaneous (782 7) (R23 3)   13  Encounter for routine gynecological examination with Papanicolaou smear of cervix    (V72 31,V76 2) (Z01 419)   14  Encounter for screening mammogram for malignant neoplasm of breast (V76 12)    (Z12 31)   15  Epilepsy And Recurrent Seizures (345 90)   16  Esophageal reflux (530 81) (K21 9)   17  Fatigue (780 79) (R53 83)   18  Foot Sprain (845 10)   19  Gastritis (535 50) (K29 70)   20  History of allergy (V15 09) (Z88 9)   21  Hyperlipidemia (272 4) (E78 5)   22  Knee pain (719 46) (M20 569)   23  Laceration of right hand, initial encounter (882 0) (S61 774A)   24   Lateral epicondylitis of left elbow (726 32) (M77 12)   25  Lower back pain (724 2) (M54 5)   26  PPD screening test (V74 1) (Z11 1)   27  Right shoulder pain (719 41) (M25 511)   28  Seizures (780 39) (R56 9)   29  Spondylosis of cervical region without myelopathy or radiculopathy (721 0) (M47 812)   30  Tendinitis of left rotator cuff (726 10) (M75 82)   31  Thumb laceration (883 0) (S61 019A)   32  Trigger middle finger of left hand (727 03) (M65 332)   33  URTI (acute upper respiratory infection) (465 9) (J06 9)   34  Viral labyrinthitis of left ear (386 35) (H83 02)   35  Viral labyrinthitis of right ear (386 35) (H83 01)   36  Visit for suture removal (V58 32) (Z48 02)    Current Meds   1  B Complex Oral Capsule; take 1 capsule daily; Therapy: 23UQB6127 to Recorded   2  Lecithin Granules; USE AS DIRECTED; Therapy: 00OHK8781 to Recorded   3  MethylPREDNISolone Acetate 40 MG/ML Injection Suspension; bursa injection; To Be   Done: 79FOX4815; Status: HOLD FOR - Administration Ordered   4  Vitamin E 1000 UNIT Oral Capsule; take 1 capsule daily; Therapy: 13KLX6141 to Recorded    Allergies    1  Codeine Derivatives   2  Penicillins    3  Milk   4   No Known Environmental Allergies    Signatures   Electronically signed by : Aleksandra Carlson RN; Sep 21 2017  8:19AM EST                       (Author)

## 2018-01-12 NOTE — RESULT NOTES
Message   Recorded as Task   Date: 09/22/2017 09:00 AM, Created By: Matty Maher   Task Name: Follow Up   Assigned To: [id number] end procedure,Team   Regarding Patient: CRUZITO FALCON, Status: Active   Comment:    Khris Marcano - 22 Sep 2017 9:00 AM     TASK CREATED  Pt  is S/P RT EHSAN on 9/20 by Jovanny Mariee  F/U is Janice Barajas - 28 Sep 2017 8:20 AM     TASK EDITED  Nya w/cb# provided office hours   Brittney Li - 28 Sep 2017 8:42 AM     TASK EDITED  Pt returned call stating she had about 70% relief  She said she still as some pain yet -- mostly in her lower back  Pt can be reached at [phone]     [facility] - 28 Sep 1565 26:64 AM     TASK EDITED   Florentino Leger - 28 Sep 2017 10:34 AM     TASK REPLIED TO: Previously Assigned To Florentino Leger                      aware        Signatures   Electronically signed by : Dayanna Sousa, ; Sep 29 2017  1:48PM EST                       (Author)

## 2018-01-13 VITALS
HEART RATE: 79 BPM | DIASTOLIC BLOOD PRESSURE: 73 MMHG | SYSTOLIC BLOOD PRESSURE: 104 MMHG | WEIGHT: 185 LBS | BODY MASS INDEX: 37.37 KG/M2

## 2018-01-13 VITALS
HEIGHT: 59 IN | RESPIRATION RATE: 12 BRPM | HEART RATE: 64 BPM | WEIGHT: 194 LBS | SYSTOLIC BLOOD PRESSURE: 112 MMHG | BODY MASS INDEX: 39.11 KG/M2 | DIASTOLIC BLOOD PRESSURE: 62 MMHG

## 2018-01-13 VITALS
WEIGHT: 196 LBS | DIASTOLIC BLOOD PRESSURE: 62 MMHG | SYSTOLIC BLOOD PRESSURE: 110 MMHG | TEMPERATURE: 98.1 F | HEIGHT: 59 IN | BODY MASS INDEX: 39.51 KG/M2

## 2018-01-13 VITALS
HEART RATE: 90 BPM | WEIGHT: 192.13 LBS | HEIGHT: 59 IN | DIASTOLIC BLOOD PRESSURE: 89 MMHG | SYSTOLIC BLOOD PRESSURE: 139 MMHG | BODY MASS INDEX: 38.73 KG/M2

## 2018-01-13 VITALS
WEIGHT: 190 LBS | BODY MASS INDEX: 38.3 KG/M2 | SYSTOLIC BLOOD PRESSURE: 114 MMHG | DIASTOLIC BLOOD PRESSURE: 74 MMHG | HEIGHT: 59 IN | HEART RATE: 69 BPM

## 2018-01-14 VITALS
WEIGHT: 187.38 LBS | HEIGHT: 59 IN | SYSTOLIC BLOOD PRESSURE: 108 MMHG | BODY MASS INDEX: 37.77 KG/M2 | HEART RATE: 77 BPM | DIASTOLIC BLOOD PRESSURE: 75 MMHG

## 2018-01-14 VITALS
BODY MASS INDEX: 37.57 KG/M2 | DIASTOLIC BLOOD PRESSURE: 79 MMHG | HEART RATE: 71 BPM | SYSTOLIC BLOOD PRESSURE: 114 MMHG | WEIGHT: 186 LBS

## 2018-01-15 VITALS
BODY MASS INDEX: 37.77 KG/M2 | HEIGHT: 59 IN | SYSTOLIC BLOOD PRESSURE: 115 MMHG | DIASTOLIC BLOOD PRESSURE: 78 MMHG | HEART RATE: 79 BPM | WEIGHT: 187.38 LBS

## 2018-01-15 VITALS
WEIGHT: 194 LBS | HEART RATE: 64 BPM | SYSTOLIC BLOOD PRESSURE: 104 MMHG | HEIGHT: 59 IN | BODY MASS INDEX: 39.11 KG/M2 | DIASTOLIC BLOOD PRESSURE: 62 MMHG | RESPIRATION RATE: 12 BRPM

## 2018-01-17 ENCOUNTER — DOCTOR'S OFFICE (OUTPATIENT)
Dept: URBAN - METROPOLITAN AREA CLINIC 136 | Facility: CLINIC | Age: 53
Setting detail: OPHTHALMOLOGY
End: 2018-01-17
Payer: COMMERCIAL

## 2018-01-17 ENCOUNTER — RX ONLY (RX ONLY)
Age: 53
End: 2018-01-17

## 2018-01-17 DIAGNOSIS — H52.03: ICD-10-CM

## 2018-01-17 DIAGNOSIS — H52.223: ICD-10-CM

## 2018-01-17 DIAGNOSIS — H25.013: ICD-10-CM

## 2018-01-17 DIAGNOSIS — H52.4: ICD-10-CM

## 2018-01-17 DIAGNOSIS — H04.123: ICD-10-CM

## 2018-01-17 PROCEDURE — NCRX N/C GLASSES RX: Performed by: OPTOMETRIST

## 2018-01-17 PROCEDURE — 99213 OFFICE O/P EST LOW 20 MIN: CPT | Performed by: OPTOMETRIST

## 2018-01-17 ASSESSMENT — REFRACTION_MANIFEST
OS_VA1: 20/
OS_VA2: 20/
OD_VA2: 20/
OS_VA3: 20/
OS_VA1: 20/
OD_VA3: 20/
OS_VA3: 20/
OD_VA1: 20/
OS_VA2: 20/
OD_VA2: 20/
OU_VA: 20/
OD_VA3: 20/
OD_VA1: 20/
OU_VA: 20/

## 2018-01-17 ASSESSMENT — REFRACTION_CURRENTRX
OD_OVR_VA: 20/
OS_OVR_VA: 20/
OS_AXIS: 107
OS_VPRISM_DIRECTION: BF
OS_ADD: +2.50
OD_OVR_VA: 20/
OS_CYLINDER: -3.00
OD_CYLINDER: -0.50
OS_OVR_VA: 20/
OS_SPHERE: +3.25
OD_SPHERE: -0.75
OD_VPRISM_DIRECTION: BF
OD_ADD: +2.50
OD_AXIS: 097
OS_OVR_VA: 20/
OD_OVR_VA: 20/

## 2018-01-17 ASSESSMENT — VISUAL ACUITY
OD_BCVA: 20/25
OS_BCVA: 20/20-1

## 2018-01-17 ASSESSMENT — REFRACTION_OUTSIDERX
OS_SPHERE: +1.00
OD_SPHERE: +0.75
OS_ADD: +2.50
OS_VA2: 20/20(J1+)
OS_CYLINDER: -0.50
OU_VA: 20/20
OD_CYLINDER: -0.50
OS_AXIS: 105
OD_VA1: 20/20-1
OD_AXIS: 090
OD_VA2: 20/20(J1+)
OD_VA3: 20/
OS_VA3: 20/
OD_ADD: +2.50
OS_VA1: 20/20

## 2018-01-17 ASSESSMENT — TEAR BREAK UP TIME (TBUT)
OD_TBUT: 2+
OS_TBUT: 2+

## 2018-01-17 ASSESSMENT — REFRACTION_AUTOREFRACTION
OS_SPHERE: +1.00
OS_AXIS: 107
OS_CYLINDER: -0.75
OD_SPHERE: +0.75
OD_CYLINDER: -0.50
OD_AXIS: 087

## 2018-01-17 ASSESSMENT — DECREASING TEAR LAKE - SEVERITY SCORE
OD_DEC_TEARLAKE: 2+
OS_DEC_TEARLAKE: 2+

## 2018-01-17 ASSESSMENT — SPHEQUIV_DERIVED
OS_SPHEQUIV: 0.625
OD_SPHEQUIV: 0.5

## 2018-01-17 ASSESSMENT — SUPERFICIAL PUNCTATE KERATITIS (SPK)
OS_SPK: 2+
OD_SPK: 2+

## 2018-01-22 VITALS
HEART RATE: 85 BPM | SYSTOLIC BLOOD PRESSURE: 124 MMHG | DIASTOLIC BLOOD PRESSURE: 85 MMHG | WEIGHT: 196 LBS | BODY MASS INDEX: 39.59 KG/M2

## 2018-01-23 VITALS
HEIGHT: 59 IN | BODY MASS INDEX: 39.72 KG/M2 | DIASTOLIC BLOOD PRESSURE: 78 MMHG | SYSTOLIC BLOOD PRESSURE: 120 MMHG | WEIGHT: 197 LBS | TEMPERATURE: 99.2 F

## 2018-01-23 NOTE — RESULT NOTES
Verified Results  (1) URINE CULTURE 02Jan2018 04:31PM Devorah Cross Order Number: [id number]     Test Name Result Flag Reference   CLINICAL REPORT (Report) A    Test:        Urine culture  Specimen Type:   Urine  Specimen Date:   1/2/2018 4:31 PM  Result Date:    1/3/2018 5:07 PM  Result Status:   Final result  Abnormal:      Yes  Resulting Lab:   [facility] [address] [place] [address]            Tel: [phone]      CULTURE                                       ------------------                                   >100,000 cfu/ml Alpha Hemolytic Streptococcus NOT Enterococcus (Abnormal)    10,000-19,000 cfu/ml      *** Mixed Contaminants X2 ***

## 2018-01-23 NOTE — MISCELLANEOUS
Message  Return to work or school:   Romulo Colunga is under my professional care   She was seen in my office on 01/02/2018   She is able to return to work on   01/05/2018           Signatures   Electronically signed by : James Arriola, ; Jan 2 2018  1:23PM EST                       (Author)

## 2018-01-23 NOTE — MISCELLANEOUS
Message  Return to work or school:   Adeola Torres is under my professional care  She was seen in my office on   She is able to return to work on   1/8/2018            Signatures   Electronically signed by : Scarlet Velez, ; David  3 2018  5:32PM EST                       (Author)

## 2018-07-05 ENCOUNTER — OFFICE VISIT (OUTPATIENT)
Dept: FAMILY MEDICINE CLINIC | Facility: CLINIC | Age: 53
End: 2018-07-05
Payer: COMMERCIAL

## 2018-07-05 VITALS
SYSTOLIC BLOOD PRESSURE: 116 MMHG | DIASTOLIC BLOOD PRESSURE: 72 MMHG | HEIGHT: 59 IN | WEIGHT: 195 LBS | TEMPERATURE: 98.6 F | BODY MASS INDEX: 39.31 KG/M2

## 2018-07-05 DIAGNOSIS — M47.816 LUMBAR SPONDYLOSIS: ICD-10-CM

## 2018-07-05 DIAGNOSIS — R52 PAIN: Primary | ICD-10-CM

## 2018-07-05 PROCEDURE — 99213 OFFICE O/P EST LOW 20 MIN: CPT | Performed by: FAMILY MEDICINE

## 2018-07-05 RX ORDER — NAPROXEN 500 MG/1
500 TABLET ORAL 2 TIMES DAILY WITH MEALS
Qty: 180 TABLET | Refills: 1 | Status: SHIPPED | OUTPATIENT
Start: 2018-07-05 | End: 2018-07-05 | Stop reason: SDUPTHER

## 2018-07-05 RX ORDER — NAPROXEN 500 MG/1
500 TABLET ORAL 2 TIMES DAILY WITH MEALS
Qty: 180 TABLET | Refills: 0 | Status: SHIPPED | OUTPATIENT
Start: 2018-07-05 | End: 2019-01-18

## 2018-07-05 RX ORDER — NAPROXEN AND ESOMEPRAZOLE MAGNESIUM 500; 20 MG/1; MG/1
1 TABLET, DELAYED RELEASE ORAL 2 TIMES DAILY
Refills: 0 | Status: CANCELLED | OUTPATIENT
Start: 2018-07-05

## 2018-07-05 NOTE — PROGRESS NOTES
Assessment/Plan:     patient use naproxen as needed for back pain  Seizures controlled with Topamax  Patient will have biopsy lesion on scalp  Diagnoses and all orders for this visit:    Pain    Lumbar spondylosis  -     naproxen (NAPROSYN) 500 mg tablet; Take 1 tablet (500 mg total) by mouth 2 (two) times a day with meals    Other orders  -     Cancel: Naproxen-Esomeprazole (VIMOVO) 500-20 MG TBEC; Take 1 tablet by mouth 2 (two) times a day          Subjective:      Patient ID: Maggie Landis is a 52 y o  female  Patient here to follow-up on lumbar pain and needs refills on naproxen  Patient also with dark spot on her scalp noticed over the past few weeks patient also notes some itching  No change in shampoos  No difference and hair coloring  The following portions of the patient's history were reviewed and updated as appropriate: allergies, current medications, past family history, past medical history, past social history, past surgical history and problem list     Review of Systems   Constitutional: Negative  HENT: Negative  Eyes: Negative  Respiratory: Negative  Cardiovascular: Negative  Gastrointestinal: Negative  Endocrine: Negative  Genitourinary: Negative  Musculoskeletal: Positive for back pain  Skin: Positive for color change  Allergic/Immunologic: Negative  Neurological: Negative  Hematological: Negative  Psychiatric/Behavioral: Negative  Objective:      /72 (BP Location: Right arm, Patient Position: Sitting, Cuff Size: Standard)   Temp 98 6 °F (37 °C) (Tympanic)   Ht 4' 11" (1 499 m)   Wt 88 5 kg (195 lb)   LMP  (LMP Unknown)   BMI 39 39 kg/m²          Physical Exam   Constitutional: She is oriented to person, place, and time  She appears well-developed and well-nourished  No distress  HENT:   Head: Normocephalic     Right Ear: External ear normal    Left Ear: External ear normal    Mouth/Throat: Oropharynx is clear and moist  No oropharyngeal exudate  Eyes: EOM are normal  Pupils are equal, round, and reactive to light  Right eye exhibits no discharge  Left eye exhibits no discharge  No scleral icterus  Neck: Normal range of motion  Neck supple  No thyromegaly present  Cardiovascular: Normal rate, regular rhythm, normal heart sounds and intact distal pulses  Exam reveals no gallop and no friction rub  No murmur heard  Pulmonary/Chest: Effort normal and breath sounds normal  No respiratory distress  She has no wheezes  She has no rales  She exhibits no tenderness  Abdominal: Soft  Bowel sounds are normal  She exhibits no distension  There is no tenderness  There is no rebound and no guarding  Musculoskeletal: Normal range of motion  She exhibits no edema or tenderness  Lymphadenopathy:     She has no cervical adenopathy  Neurological: She is oriented to person, place, and time  No cranial nerve deficit  She exhibits normal muscle tone  Coordination normal    Skin: Skin is warm and dry  No rash noted  She is not diaphoretic  No erythema  No pallor  Irregular dark nevus on scalp left of midline parietal region   Psychiatric: She has a normal mood and affect  Her behavior is normal  Judgment and thought content normal    Nursing note and vitals reviewed

## 2018-07-12 ENCOUNTER — PROCEDURE VISIT (OUTPATIENT)
Dept: FAMILY MEDICINE CLINIC | Facility: CLINIC | Age: 53
End: 2018-07-12
Payer: COMMERCIAL

## 2018-07-12 VITALS — WEIGHT: 195 LBS | HEIGHT: 59 IN | BODY MASS INDEX: 39.31 KG/M2

## 2018-07-12 DIAGNOSIS — D23.9 DYSPLASTIC NEVI: Primary | ICD-10-CM

## 2018-07-12 PROCEDURE — 88305 TISSUE EXAM BY PATHOLOGIST: CPT | Performed by: PATHOLOGY

## 2018-07-12 PROCEDURE — 11421 EXC H-F-NK-SP B9+MARG 0.6-1: CPT | Performed by: FAMILY MEDICINE

## 2018-07-12 NOTE — PROGRESS NOTES
Assessment/Plan:          Diagnoses and all orders for this visit:    Dysplastic nevi    Other orders  -     Skin excision          Subjective:      Patient ID: Jimmie Argueta is a 52 y o  female  Patient is here for biopsy of dysplastic nevi on scalp  The following portions of the patient's history were reviewed and updated as appropriate: allergies, current medications, past family history, past medical history, past social history, past surgical history and problem list     Review of Systems   Skin:        Color change, change in appearance to nevi on left scalp         Objective:      Ht 4' 11" (1 499 m)   Wt 88 5 kg (195 lb)   LMP  (LMP Unknown)   BMI 39 39 kg/m²          Physical Exam   Skin:   4 mm dysplastic nevi left scalp mass near midline frontal/parietal region   Nursing note and vitals reviewed  Skin excision  Date/Time: 7/12/2018 11:07 AM  Performed by: Damon Sandoval  Authorized by: Damon Sandoval     Procedure Details - Skin Excision:     Number of Lesions:  1  Lesion 1:     Body area:  Head/neck    Head/neck location:  Scalp    Initial size (mm):  4       Final defect size (mm):  6    Malignancy: benign lesion      Destruction method: scissors used for extraction      Repair type:  Linear closure    Closure complexity: simple      Surgical defect:  6    Repair size (cm):  0 6     Informed consent obtained  Betadine and alcohol use  1 cc of lidocaine 1 percent with epinephrine use  Excision of lesion done  Specimen sent to pathology  2 , 4- 0 nylon sutures placed  Patient tolerated procedure well Neosporin applied  Patient will keep area clean and dry and use Neosporin daily as directed  Other guidance given    Patient will follow up in roughly 10 days for suture removal

## 2018-07-24 ENCOUNTER — OFFICE VISIT (OUTPATIENT)
Dept: FAMILY MEDICINE CLINIC | Facility: CLINIC | Age: 53
End: 2018-07-24

## 2018-07-24 VITALS
HEIGHT: 59 IN | WEIGHT: 198.3 LBS | SYSTOLIC BLOOD PRESSURE: 110 MMHG | DIASTOLIC BLOOD PRESSURE: 62 MMHG | TEMPERATURE: 97.3 F | BODY MASS INDEX: 39.98 KG/M2

## 2018-07-24 DIAGNOSIS — D23.9 BLUE NEVUS: Primary | ICD-10-CM

## 2018-07-24 DIAGNOSIS — Z48.02 VISIT FOR SUTURE REMOVAL: ICD-10-CM

## 2018-07-24 PROCEDURE — 99024 POSTOP FOLLOW-UP VISIT: CPT | Performed by: FAMILY MEDICINE

## 2018-07-24 NOTE — PROGRESS NOTES
Assessment/Plan:  Sutures removed  Bacitracin used  Path  Report discussed with the pt  Diagnoses and all orders for this visit:    Blue nevus    Visit for suture removal          Subjective:      Patient ID: Gurpreet Garza is a 52 y o  female  Pt  Is here for suture removal          The following portions of the patient's history were reviewed and updated as appropriate: allergies, current medications, past family history, past medical history, past social history, past surgical history and problem list     Review of Systems   Constitutional: Negative  HENT: Negative  Eyes: Negative  Respiratory: Negative  Cardiovascular: Negative  Gastrointestinal: Negative  Endocrine: Negative  Genitourinary: Negative  Musculoskeletal: Negative  Skin: Negative  Allergic/Immunologic: Negative  Neurological: Negative  Hematological: Negative  Psychiatric/Behavioral: Negative  Objective:      /62 (BP Location: Left arm, Patient Position: Sitting, Cuff Size: Standard)   Temp (!) 97 3 °F (36 3 °C) (Tympanic)   Ht 4' 11" (1 499 m)   Wt 89 9 kg (198 lb 4 8 oz)   LMP  (LMP Unknown)   BMI 40 05 kg/m²          Physical Exam   Skin:   Incision intact   Nursing note and vitals reviewed

## 2018-09-02 ENCOUNTER — HOSPITAL ENCOUNTER (EMERGENCY)
Facility: HOSPITAL | Age: 53
Discharge: HOME | End: 2018-09-02
Attending: EMERGENCY MEDICINE
Payer: COMMERCIAL

## 2018-09-02 ENCOUNTER — APPOINTMENT (EMERGENCY)
Dept: RADIOLOGY | Facility: HOSPITAL | Age: 53
End: 2018-09-02
Payer: COMMERCIAL

## 2018-09-02 VITALS
RESPIRATION RATE: 18 BRPM | TEMPERATURE: 98.4 F | SYSTOLIC BLOOD PRESSURE: 117 MMHG | DIASTOLIC BLOOD PRESSURE: 70 MMHG | WEIGHT: 200 LBS | HEART RATE: 70 BPM | BODY MASS INDEX: 40.4 KG/M2 | OXYGEN SATURATION: 98 %

## 2018-09-02 DIAGNOSIS — M79.671 RIGHT FOOT PAIN: ICD-10-CM

## 2018-09-02 DIAGNOSIS — M25.571 RIGHT ANKLE PAIN: Primary | ICD-10-CM

## 2018-09-02 PROCEDURE — 99283 EMERGENCY DEPT VISIT LOW MDM: CPT

## 2018-09-02 PROCEDURE — 73610 X-RAY EXAM OF ANKLE: CPT

## 2018-09-02 PROCEDURE — 73630 X-RAY EXAM OF FOOT: CPT

## 2018-09-02 RX ORDER — ACETAMINOPHEN 325 MG/1
650 TABLET ORAL ONCE
Status: COMPLETED | OUTPATIENT
Start: 2018-09-02 | End: 2018-09-02

## 2018-09-02 RX ADMIN — ACETAMINOPHEN 650 MG: 325 TABLET, FILM COATED ORAL at 09:55

## 2018-09-02 NOTE — DISCHARGE INSTRUCTIONS
Arthralgia   WHAT YOU NEED TO KNOW:   Arthralgia is pain in one or more joints, with no inflammation  It may be short-term and get better within 6 to 8 weeks  Arthralgia can be an early sign of arthritis  Arthralgia may be caused by a medical condition, such as a hormone disorder or a tumor  It may also be caused by an infection or injury  DISCHARGE INSTRUCTIONS:   Medicines: The following medicines may  be ordered for you:  · Acetaminophen  decreases pain  Ask how much to take and how often to take it  Follow directions  Acetaminophen can cause liver damage if not taken correctly  · NSAIDs  decrease pain and prevent swelling  Ask your healthcare provider which medicine is right for you  Ask how much to take and when to take it  Take as directed  NSAIDs can cause stomach bleeding and kidney problems if not taken correctly  · Pain relief cream  decreases pain  Use this cream as directed  · Take your medicine as directed  Contact your healthcare provider if you think your medicine is not helping or if you have side effects  Tell him of her if you are allergic to any medicine  Keep a list of the medicines, vitamins, and herbs you take  Include the amounts, and when and why you take them  Bring the list or the pill bottles to follow-up visits  Carry your medicine list with you in case of an emergency  Follow up with your healthcare provider or specialist as directed:  Write down your questions so you remember to ask them during your visits  Self-care:   · Apply heat  to help decrease pain  Use a heating pad or heat wrap  Apply heat for 20 to 30 minutes every 2 hours for as many days as directed  · Rest  as much as possible  Avoid activities that cause joint pain  · Apply ice  to help decrease swelling and pain  Ice may also help prevent tissue damage  Use an ice pack, or put crushed ice in a plastic bag   Cover it with a towel and place it on your painful joint for 15 to 20 minutes every hour or as directed  · Support  the joint with a brace or elastic wrap as directed  · Elevate  your joint above the level of your heart as often as you can to help decrease swelling and pain  Prop your painful joint on pillows or blankets to keep it elevated comfortably  · Lose weight  if you are overweight  Extra weight can put pressure on your joints and cause more pain  Ask your healthcare provider how much you should weigh  Ask him to help you create a weight loss plan  · Exercise  regularly to help improve joint movement and to decrease pain  Ask about the best exercise plan for you  Low-impact exercises can help take the pressure off your joints  Examples are walking, swimming, and water aerobics  Physical therapy:  A physical therapist teaches you exercises to help improve movement and strength, and to decrease pain  Ask your healthcare provider if physical therapy is right for you  Contact your healthcare provider or specialist if:   · You have a fever  · You continue to have joint pain that cannot be relieved with heat, ice, or medicine  · You have pain and inflammation around your joint  · You have questions or concerns about your condition or care  Return to the emergency department if:   · You have sudden, severe pain when you move your joint  · You have a fever and shaking chills  · You cannot move your joint  · You lose feeling on the side of your body where you have the painful joint  © 2017 [facility] Information is for End User's use only and may not be sold, redistributed or otherwise used for commercial purposes  All illustrations and images included in CareNotes® are the copyrighted property of A D A M , Inc  or [employer]  The above information is an  only  It is not intended as medical advice for individual conditions or treatments   Talk to your doctor, nurse or pharmacist before following any medical regimen to see if it is safe and effective for you

## 2018-09-02 NOTE — ED PROVIDER NOTES
History  Chief Complaint   Patient presents with    Ankle Pain     Right ankle pain starting yesterday  Denies trauma/injury  No swelling noted  Also reporting "my knees click" Denies any other physical compaints  Pt states she has had right ankle/foot pain for "a while  And I really mean awhile "  Worsened over since yesterday  Hurts to bear weight  Denies injury/twisting  Also notes sometimes when she bends her knees, they crack or pop  Had acupuncture yesterday to her back and is unsure if that has anything to do with her worsening ankle pain today  Pt states "I really just like doing natural stuff "        History provided by:  Patient   used: No    Ankle Pain   Location:  Ankle and foot  Injury: no    Ankle location:  R ankle  Foot location:  R foot  Chronicity:  Chronic  Dislocation: no    Prior injury to area:  No  Relieved by:  Nothing  Worsened by:  Bearing weight  Ineffective treatments:  NSAIDs  Associated symptoms: no decreased ROM, no muscle weakness, no numbness, no swelling and no tingling        Prior to Admission Medications   Prescriptions Last Dose Informant Patient Reported? Taking?   naproxen (NAPROSYN) 500 mg tablet   No No   Sig: Take 1 tablet (500 mg total) by mouth 2 (two) times a day with meals   topiramate (TOPAMAX) 100 mg tablet   Yes No   Sig: Take 100 mg by mouth 2 (two) times a day        Facility-Administered Medications: None       Past Medical History:   Diagnosis Date    Adhesive capsulitis of shoulder     LEFT    Anemia     Anesthesia complication     difficulty awakening    Milk intolerance     Pneumonia     [facility] [address] [facility]     last seizure 2011    Seizures ([facility] [address] [facility]     Sexually transmitted disease     Urinary incontinence     Wears dentures     full upper and partial lower - doesnt wear lower    Wears glasses     reading glasses       Past Surgical History:   Procedure Laterality Date    BLADDER SUSPENSION      CHOLECYSTECTOMY Laparoscopic    INDUCED       CO SHLDR ARTHROSCOP,PART ACROMIOPLAS Left 2016    Procedure: ARTHROSCOPY SHOULDER ,LYSIS OF ADHESIONS MANIPULATION UNDER ANESTHESIA; INJECTION OF LEFT SHOULDER;  Surgeon: Bill Asencio MD;  Location: [facility];  Service: Orthopedics    TUBAL LIGATION         Family History   Problem Relation Age of Onset    Cancer Mother         carcinoma in Situ    Diabetes Mother     Hypertension Mother     Stroke Mother         syndrome    Brain cancer Family      I have reviewed and agree with the history as documented  Social History   Substance Use Topics    Smoking status: Former Smoker     Quit date: 1991    Smokeless tobacco: Never Used      Comment: Never a smoker, per [employer]    Alcohol use Yes      Comment: 1-2 x month, No alcohol use, per [employer]scripts        Review of Systems   Musculoskeletal: Negative for joint swelling  Right foot/ankle pain   Skin: Negative for rash and wound  Neurological: Negative for weakness and numbness  All other systems reviewed and are negative  Physical Exam  Physical Exam   Constitutional: She appears well-developed and well-nourished  Non-toxic appearance  She does not have a sickly appearance  She does not appear ill  No distress  HENT:   Head: Normocephalic and atraumatic  Eyes: EOM are normal    Neck: No JVD present  Cardiovascular: Intact distal pulses  Pulses:       Dorsalis pedis pulses are 2+ on the right side  Pulmonary/Chest: Effort normal  No stridor  No apnea  No respiratory distress  Musculoskeletal:        Right ankle: She exhibits normal range of motion, no swelling, no ecchymosis, no deformity, no laceration and normal pulse  Tenderness  Lateral malleolus tenderness found  No medial malleolus and no proximal fibula tenderness found  Right foot: There is tenderness (Dorsolateral foot)   There is normal range of motion, no bony tenderness, no swelling, normal capillary refill, no crepitus, no deformity and no laceration  No signs of septic arthritis     Feet:   Right Foot:   Skin Integrity: Negative for ulcer, blister, skin breakdown, erythema, warmth, callus or dry skin  Neurological: She is alert  She has normal strength  No sensory deficit  Skin: Skin is warm, dry and intact  Capillary refill takes less than 2 seconds  No ecchymosis, no lesion and no rash noted  She is not diaphoretic  No erythema  Psychiatric: Her affect is not angry, not labile and not inappropriate  Nursing note and vitals reviewed  Vital Signs  ED Triage Vitals [09/02/18 0929]   Temperature Pulse Respirations Blood Pressure SpO2   98 4 °F (36 9 °C) 70 18 117/70 98 %      Temp Source Heart Rate Source Patient Position - Orthostatic VS BP Location FiO2 (%)   Temporal Monitor Sitting Right arm --      Pain Score       --           Vitals:    09/02/18 0929   BP: 117/70   Pulse: 70   Patient Position - Orthostatic VS: Sitting       Visual Acuity      ED Medications  Medications   acetaminophen (TYLENOL) tablet 650 mg (650 mg Oral Given 9/2/18 0955)       Diagnostic Studies  Results Reviewed     None                 XR foot 3+ views RIGHT   ED Interpretation by Dan Severance, DO (09/02 1001)   No acute abnormalities      XR ankle 3+ views RIGHT   ED Interpretation by Dan Severance, DO (09/02 1001)   No acute abnormalities                 Procedures  Procedures       Phone Contacts  ED Phone Contact    ED Course  ED Course as of Sep 02 1035   Sun Sep 02, 2018   1014 Updated pt on results  Pt is asking for a work note for tomorrow and Tuesday  Pt is asking for crutches  I instructed her to f/u with sports medicine                                  MDM  Number of Diagnoses or Management Options  Right ankle pain:   Right foot pain:   Diagnosis management comments: Right ankle/foot pain - I explained to pt that I will likely not be able to figure out the cause of her chronic ankle pain especially since there are no findings on exam and no history of trauma, but will xray to r/o occult fx and give referral to sports medicine  Amount and/or Complexity of Data Reviewed  Tests in the radiology section of CPT®: ordered and reviewed      CritCare Time    Disposition  Final diagnoses:   Right ankle pain   Right foot pain     Time reflects when diagnosis was documented in both MDM as applicable and the Disposition within this note     Time User Action Codes Description Comment    9/2/2018 10:02 AM Nat Fuentes Add [M25 571] Right ankle pain     9/2/2018 10:02 AM ZENON Fuentes	[place] Add [M79 671] Right foot pain       ED Disposition     ED Disposition Condition Comment    Discharge  [facility] discharge to home/self care  Condition at discharge: Good        Follow-up Information     Follow up With Specialties Details Why Contact Info    Otto Ho MD Sports Medicine Schedule an appointment as soon as possible for a visit For your ankle/foot pain [address] [facility]  [phone]            Patient's Medications   Discharge Prescriptions    No medications on file     No discharge procedures on file      ED Provider  Electronically Signed by           Cinthya Smith 22, DO  09/02/18 0827

## 2018-09-02 NOTE — ED NOTES
Patient transported to [address] [place] [address], [address] [place] [address]  09/02/18 5728

## 2018-09-05 ENCOUNTER — OFFICE VISIT (OUTPATIENT)
Dept: OBGYN CLINIC | Facility: MEDICAL CENTER | Age: 53
End: 2018-09-05
Payer: COMMERCIAL

## 2018-09-05 VITALS
BODY MASS INDEX: 41.6 KG/M2 | DIASTOLIC BLOOD PRESSURE: 75 MMHG | HEART RATE: 80 BPM | HEIGHT: 58 IN | WEIGHT: 198.2 LBS | SYSTOLIC BLOOD PRESSURE: 110 MMHG

## 2018-09-05 DIAGNOSIS — M21.41 PES PLANUS OF BOTH FEET: ICD-10-CM

## 2018-09-05 DIAGNOSIS — M76.821 TIBIALIS POSTERIOR TENDINITIS, RIGHT: Primary | ICD-10-CM

## 2018-09-05 DIAGNOSIS — M72.2 PLANTAR FASCIITIS OF RIGHT FOOT: ICD-10-CM

## 2018-09-05 DIAGNOSIS — M21.42 PES PLANUS OF BOTH FEET: ICD-10-CM

## 2018-09-05 PROCEDURE — 99213 OFFICE O/P EST LOW 20 MIN: CPT | Performed by: FAMILY MEDICINE

## 2018-09-05 NOTE — PATIENT INSTRUCTIONS
I explained the patient that she has tendinitis in her foot due to mild flatfoot and abnormal foot mechanics when walking  Associated symptoms with this also include plantar fasciitis  I explained that plantar fasciitis as well as tendinitis can be a chronic injury and that the curative treatment is physical therapy as well as rest   I  Recommended patient to start gel heel cup in her shoe to relieve pressure from her heel when walking and also to acquire flatfoot pes planus orthotic inserts for her shoes

## 2018-09-05 NOTE — LETTER
September 5, 2018     Patient: Merideth Goldberg   YOB: 1965   Date of Visit: 9/5/2018       To Whom it May Concern:    Merideth Goldberg is under my professional care  She was seen in my office on 9/5/2018  She may return to work on 09/10/2018  If you have any questions or concerns, please don't hesitate to call           Sincerely,          Marguerite Mccauley III, DO        CC: Merideth Goldberg

## 2018-09-05 NOTE — PROGRESS NOTES
1  Tibialis posterior tendinitis, right  [facility]   2  Pes planus of both feet  Orthotics B/L    [facility]   3  Plantar fasciitis of right foot  [facility]     Orders Placed This Encounter   Procedures    Orthotics B/L    [facility]        Imaging Studies (I personally reviewed images in PACS and report):  X-ray right foot 09/02/2018:  No acute osseous abnormality  Plantar spur mild  X-ray right ankle 09/02/2018:  No acute osseous abnormality    IMPRESSION:  Right foot posterior tibialis tendinitis  Plantar fasciitis      Return in about 6 weeks (around 10/17/2018)  Patient Instructions     I explained the patient that she has tendinitis in her foot due to mild flatfoot and abnormal foot mechanics when walking  Associated symptoms with this also include plantar fasciitis  I explained that plantar fasciitis as well as tendinitis can be a chronic injury and that the curative treatment is physical therapy as well as rest   I  Recommended patient to start gel heel cup in her shoe to relieve pressure from her heel when walking and also to acquire flatfoot pes planus orthotic inserts for her shoes  CHIEF COMPLAINT:  Right ankle pain    HPI:  Maggie Landis is a 52 y o  female  who presents for       Visit  09/05/2018:   Evaluation right ankle pain ongoing for approximately 1 year  Patient states that he did worsen within the last 1 week  She denies any recent or remote injury  The patient was initially evaluated in ER 09/02/2018 where she had x-ray performed on 09/02/2018 which was negative for fracture  Patient was given Ace bandage as well as crutches  She has been able transition to full weight-bearing and has some mild pain when walking  Today, patient states that she has approximately 30% improved her ankle  Review of Systems   Constitutional: Negative for chills, fever and unexpected weight change     HENT: Negative for hearing loss, nosebleeds and sore throat  Eyes: Negative for pain, redness and visual disturbance  Respiratory: Negative for cough, shortness of breath and wheezing  Cardiovascular: Negative for chest pain, palpitations and leg swelling  Gastrointestinal: Negative for abdominal distention, nausea and vomiting  Endocrine: Negative for polydipsia and polyuria  Genitourinary: Negative for dysuria and hematuria  Skin: Negative for rash and wound  Neurological: Negative for dizziness, numbness and headaches  Psychiatric/Behavioral: Negative for decreased concentration and suicidal ideas           Following history reviewed and update:    Past Medical History:   Diagnosis Date    Adhesive capsulitis of shoulder     LEFT    Anemia     Anesthesia complication     difficulty awakening    Milk intolerance     Pneumonia     [facility] [address] [facility]     last seizure     [facility]ures [facility] [address] [facility]     Sexually transmitted disease     Urinary incontinence     Wears dentures     full upper and partial lower - doesnt wear lower    Wears glasses     reading glasses     Past Surgical History:   Procedure Laterality Date    BLADDER SUSPENSION      CHOLECYSTECTOMY      Laparoscopic    INDUCED       [facility] Av[facility] Lock Left 2016    Procedure: ARTHROSCOPY SHOULDER ,LYSIS OF ADHESIONS MANIPULATION UNDER ANESTHESIA; INJECTION OF LEFT SHOULDER;  Surgeon: Cisco Springer MD;  Location: [facility];  Service: Orthopedics    TUBAL LIGATION       Social History   History   Alcohol Use    Yes     Comment: 1-2 x month, No alcohol use, per allscripts     History   Drug Use No     History   Smoking Status    Former Smoker    Quit date: 1991   Smokeless Tobacco    Never Used     Comment: Never a smoker, per [employer]     Family History   Problem Relation Age of Onset    Cancer Mother         carcinoma in Situ    Diabetes Mother     Hypertension Mother     Stroke Mother         syndrome    Brain cancer Family      Allergies   Allergen Reactions    Codeine Other (See Comments)     hallucinations    Lactose Intolerance (Gi)      Pt states she gets "gassy"    Penicillins Hives          Physical Exam  Constitutional:  see vital signs  Gen: well-developed, normocephalic/atraumatic, well-groomed  Eyes: No inflammation or discharge of conjunctiva or lids; sclera clear   Pharynx: no inflammation, lesion, or mass of lips  Neck: supple, no masses, non-distended  MSK: no inflammation, lesion, mass, or clubbing of nails and digits except for other than mentioned below  SKIN: no visible rashes or skin lesions  Pulmonary/Chest: Effort normal  No respiratory distress     NEURO: cranial nerves grossly intact  PSYCH:  Alert and oriented to person, place, and time; recent and remote memory intact; mood normal, no depression, anxiety, or agitation, judgment and insight good and intact     Ortho Exam  RIGHT ANKLE & FOOT EXAM  Observation  GAIT:  normal    Inspection  Erythema: no  Ecchymosis: no  Edema:  none      Tenderness  Proximal Fibula: no  (Maisonneuve frx)  AiTFL: no  (2cm proximal-medial to tip lateral malleolus 92% sens, 29% spec)  ATFL: no  CFL: no  PTFL: no  Achilles:  no  deltoid: No  Peroneal: no  Tibialis Anterior: no  Tibialis Posterior:+ posterior malleolus and insertion point    Bony Tenderpoints:  Lateral Malleolus: no  Base of 5th MT: no  Medial Malleolus: no  Navicular: no  Talar dome: No    ROM  Dorsiflexion: intact  Plantarflexion: intact    Muscle Strength  Pronation: intact without pain  Supination: intact wit pain at PTT    Tib-Fib Squeeze: negative  (afpuojtpbhlx-de-fgiyaiqzjniija squeeze; 26% sens, 88% spec; rule in test)    Calcaneal Squeeze: negative    Dorsiflexion (+) ER Stress Test: negative  (reproduce ATiFL mech; 71% sens, 63% spec)      RIGHT CALF EXAM:  No swelling erythema or increased warmth  No palpable cords  Tenderness: none  Negative Ofelia sign    RIGHT FOOT EXAM:  No swelling, erythema or increased warmth  Tenderness:+ plantar fascia  ROM Toes extension: intact  ROM Toes flexion: intact  Strength Toes: 5/5 flex, ext  Sensation intact  Capillary refill intact  Metatarsal squeeze negative         Procedures

## 2018-09-06 ENCOUNTER — TELEPHONE (OUTPATIENT)
Dept: OBGYN CLINIC | Facility: HOSPITAL | Age: 53
End: 2018-09-06

## 2018-09-06 NOTE — TELEPHONE ENCOUNTER
Called and spoke to patient letting her know glasses were not found but would call her if they were  Also asked patient to give us a call if she finds them

## 2018-09-06 NOTE — TELEPHONE ENCOUNTER
Caller- dr Bridget Brown patient  Ph- [phone]  Patient had a OV with dr Bridget Brown yesterday  She reports she lost her reading glasses and she suspects it was during the visit or at check out  I called Pradip and was told there were none found  Sending this in case they are found later today or this week   Thank you

## 2018-09-10 ENCOUNTER — EVALUATION (OUTPATIENT)
Dept: PHYSICAL THERAPY | Facility: MEDICAL CENTER | Age: 53
End: 2018-09-10
Payer: COMMERCIAL

## 2018-09-10 DIAGNOSIS — M25.571 PAIN IN JOINT INVOLVING RIGHT ANKLE AND FOOT: Primary | ICD-10-CM

## 2018-09-10 PROCEDURE — G8978 MOBILITY CURRENT STATUS: HCPCS | Performed by: PHYSICAL THERAPIST

## 2018-09-10 PROCEDURE — 97112 NEUROMUSCULAR REEDUCATION: CPT | Performed by: PHYSICAL THERAPIST

## 2018-09-10 PROCEDURE — 97140 MANUAL THERAPY 1/> REGIONS: CPT | Performed by: PHYSICAL THERAPIST

## 2018-09-10 PROCEDURE — G8979 MOBILITY GOAL STATUS: HCPCS | Performed by: PHYSICAL THERAPIST

## 2018-09-10 PROCEDURE — 97161 PT EVAL LOW COMPLEX 20 MIN: CPT | Performed by: PHYSICAL THERAPIST

## 2018-09-10 NOTE — PROGRESS NOTES
PT Evaluation     Today's date: 9/10/2018  Patient name: Georgina Mcdonnell  : 1965  MRN: [id number]  Referring provider: Andreas Martinez  Dx:   Encounter Diagnosis   Name Primary?  Pain in joint involving right ankle and foot Yes                  Assessment  Impairments: abnormal coordination, abnormal gait, abnormal muscle firing, abnormal muscle tone, abnormal or restricted ROM, abnormal movement, impaired balance, impaired physical strength, lacks appropriate home exercise program, pain with function and poor body mechanics    Assessment details: Georgina Mcdonnell is a 52 y o  y/o female who presents with complaints of right foot and ankle pain  The patients greatest concerns are pain with walking and when driving  Primary movement impairment diagnosis of right talocrural hypomobility resulting in pathoanatomical symptoms of right foot pain, ATFL sprain, and posterior tibialis strain, which limits her ability to perform functional activities without pain  Pt  will benefit from skilled PT services that includes manual therapy techniques to enhance tissue extensibility, neuromuscular re-education to facilitate motor control, therapeutic exercise to increase functional mobility, and modalities prn to reduce pain and inflammation    Understanding of Dx/Px/POC: good   Prognosis: good    Goals  Impairment Goals  - Pt I with initial HEP in 1-2 visits  - Improve ROM equal to contralateral side in 4-6 weeks  - Increase strength to 5/5 in all affected areas in 4-6 weeks    Functional Goals  - Increase Functional Status Measure to: 58 in 6-8 weeks  - Patient will be independent with comprehensive HEP in 6-8 weeks  - Ambulation is improved to prior level of function in 6-8 weeks  - Stair negotiation is improved to prior level of function in 6-8 weeks    Plan  Patient would benefit from: PT eval  Planned modality interventions: cryotherapy and low level laser therapy  Planned therapy interventions: joint mobilization, manual therapy, massage, neuromuscular re-education, Curtis taping, patient education, strengthening, stretching, therapeutic exercise, home exercise program, graded exercise, gait training, functional ROM exercises, flexibility, balance/weight bearing training, balance and ADL retraining  Frequency: 2x week  Duration in weeks: 8  Treatment plan discussed with: patient      Subjective Evaluation    History of Present Illness  Onset date: ~ 1 year ago  Mechanism of injury: Patient reports that right foot and ankle pain began over 1 year ago  She states that she recently aggravated the right foot when descending stairs and slightly rolling her ankle a few days ago  Since this injury, it is painful to put weight on the right foot while walking and driving is bothersome when stepping on and off the gas pedal   Pain  Current pain ratin  At best pain ratin  At worst pain ratin  Quality: sharp  Relieving factors: rest  Aggravating factors: walking      Diagnostic Tests  X-ray: normal  Treatments  Current treatment: physical therapy  Patient Goals  Patient goals for therapy: decreased pain, improved balance, return to sport/leisure activities and independence with ADLs/IADLs        Objective     Observations     Right Ankle/Foot   Negative for edema  Palpation     Right Tenderness of the lateral gastrocnemius, medial gastrocnemius, peroneus, plantaris and posterior tibialis       Neurological Testing     Sensation     Ankle/Foot   Left Ankle/Foot   Intact: light touch    Right Ankle/Foot   Intact: light touch     Active Range of Motion   Left Ankle/Foot   Dorsiflexion (ke): 5 degrees   Plantar flexion: 40 degrees   Inversion: 35 degrees   Eversion: 20 degrees     Right Ankle/Foot   Dorsiflexion (ke): -5 degrees   Plantar flexion: 30 degrees   Inversion: 30 degrees   Eversion: 15 degrees     Passive Range of Motion   Left Ankle/Foot    Dorsiflexion (ke): 8 degrees   Plantar flexion: 45 degrees Inversion: 40 degrees   Eversion: 22 degrees     Right Ankle/Foot    Dorsiflexion (ke): 0 degrees   Plantar flexion: 40 degrees   Inversion: 35 degrees   Eversion: 20 degrees     Joint Play     Right Ankle/Foot  Hypomobile in the talocrural joint, subtalar joint and midfoot  Strength/Myotome Testing     Left Ankle/Foot   Dorsiflexion: 5  Plantar flexion: 5  Inversion: 5  Eversion: 5    Right Ankle/Foot   Dorsiflexion: 5  Plantar flexion: 3  Inversion: 3+  Eversion: 5    Tests     Right Ankle/Foot   Positive for inversion talar tilt  Negative for Tinel's sign (tarsal tunnel)  General Comments     Ankle/Foot Comments   No swelling noted      Precautions: None    Daily Treatment Diary     Manual  9/10            STM/MFR 10'            Talocrural mobs AZ                                                       Exercise Diary                           Gastroc stretch 5x 30s                                                                                                                                                                                                                                                          Modalities                                                             Flowsheet Rows      Most Recent Value   PT/OT G-Codes   Current Score  47   Projected Score  58          Aviva Jacobs, KARAN  9/10/2018,6:48 PM

## 2018-09-13 ENCOUNTER — APPOINTMENT (OUTPATIENT)
Dept: PHYSICAL THERAPY | Facility: MEDICAL CENTER | Age: 53
End: 2018-09-13
Payer: COMMERCIAL

## 2018-09-14 ENCOUNTER — OFFICE VISIT (OUTPATIENT)
Dept: PHYSICAL THERAPY | Facility: MEDICAL CENTER | Age: 53
End: 2018-09-14
Payer: COMMERCIAL

## 2018-09-14 DIAGNOSIS — M25.571 PAIN IN JOINT INVOLVING RIGHT ANKLE AND FOOT: Primary | ICD-10-CM

## 2018-09-14 PROCEDURE — 97112 NEUROMUSCULAR REEDUCATION: CPT | Performed by: PHYSICAL THERAPIST

## 2018-09-14 PROCEDURE — 97140 MANUAL THERAPY 1/> REGIONS: CPT | Performed by: PHYSICAL THERAPIST

## 2018-09-14 NOTE — PROGRESS NOTES
Daily Note     Today's date: 2018  Patient name: Mary Jane Castaneda  : 1965  MRN: [id number]  Referring provider: Christo Sosa  Dx:   Encounter Diagnosis     ICD-10-CM    1  Pain in joint involving right ankle and foot M25 571                   Subjective: Patient reports that she has some shooting pain into the lateral aspect of the ankle today  Objective: See treatment diary below    Precautions: None    Daily Treatment Diary     Manual  9/10 9/14           STM/MFR 10' 15'           Talocrural mobs AZ AZ           Sciatic/peroneal n mobs  AZ                                         Exercise Diary              ABCs  1x           Gastroc stretch 5x 30s 5x30s           K -> C  10x                                                                                                                                                                                                                                            Modalities                                                         Assessment:  Patient presents with moderate myofascial restrictions t/o peroneals  STM/MFR to decrease soft tissue restrictions  TTP over R ATFL  Sciatic and peroneal n mobilizations performed to enhance n mobility and decrease tingling/shooting pain into the foot  Patient felt decreased pain with standing and walking post tx session  Tolerated treatment well  Patient would benefit from continued PT      Plan: Continue per plan of care

## 2018-09-19 ENCOUNTER — OFFICE VISIT (OUTPATIENT)
Dept: PHYSICAL THERAPY | Facility: MEDICAL CENTER | Age: 53
End: 2018-09-19
Payer: COMMERCIAL

## 2018-09-19 DIAGNOSIS — M25.571 PAIN IN JOINT INVOLVING RIGHT ANKLE AND FOOT: Primary | ICD-10-CM

## 2018-09-19 PROCEDURE — 97140 MANUAL THERAPY 1/> REGIONS: CPT | Performed by: PHYSICAL THERAPIST

## 2018-09-19 PROCEDURE — 97110 THERAPEUTIC EXERCISES: CPT | Performed by: PHYSICAL THERAPIST

## 2018-09-19 NOTE — PROGRESS NOTES
Daily Note     Today's date: 2018  Patient name: Phil Nelson  : 1965  MRN: [id number]  Referring provider: Merle Baer  Dx:   Encounter Diagnosis     ICD-10-CM    1  Pain in joint involving right ankle and foot M25 571                   Subjective: Patient reports that she was a little sore after being on her feet all weekend  Objective: See treatment diary below    Precautions: None    Daily Treatment Diary     Manual  9/10 9/14 9/19          STM/MFR 10' 15' 15'          Talocrural mobs AZ AZ AZ          Sciatic/peroneal n mobs  AZ AZ                                        Exercise Diary              ABCs  1x 3x          Gastroc stretch 5x 30s 5x30s 5x30s          K -> C  10x 20x          Sciatic n glide   10x                                                                                                                                                                                                                              Modalities                                                         Assessment:  Patient presents with moderate myofascial restrictions t/o peroneals  STM/MFR to decrease soft tissue restrictions  TTP over R ATFL  Sciatic and peroneal n mobilizations performed to enhance n mobility and decrease tingling/shooting pain into the foot  Added sciatic n glides  Progress patient with ankle 4 way as tolerated  Patient felt decreased pain with standing and walking post tx session  Tolerated treatment well  Patient would benefit from continued PT      Plan: Continue per plan of care

## 2018-09-25 ENCOUNTER — TELEPHONE (OUTPATIENT)
Dept: OBGYN CLINIC | Facility: HOSPITAL | Age: 53
End: 2018-09-25

## 2018-09-25 NOTE — TELEPHONE ENCOUNTER
Ellen Marino, [facility]  Fax [phone]    Advises the RX for orthotics previously received for flat feet will not be covered by insurance  They will accept foot deformity and would like an new one sent to her

## 2018-09-26 ENCOUNTER — APPOINTMENT (OUTPATIENT)
Dept: PHYSICAL THERAPY | Facility: MEDICAL CENTER | Age: 53
End: 2018-09-26
Payer: COMMERCIAL

## 2018-09-26 DIAGNOSIS — M21.41 PES PLANUS OF BOTH FEET: ICD-10-CM

## 2018-09-26 DIAGNOSIS — M21.969 DEFORMITY OF FOOT, UNSPECIFIED LATERALITY: Primary | ICD-10-CM

## 2018-09-26 DIAGNOSIS — M21.42 PES PLANUS OF BOTH FEET: ICD-10-CM

## 2018-09-27 ENCOUNTER — APPOINTMENT (OUTPATIENT)
Dept: PHYSICAL THERAPY | Facility: MEDICAL CENTER | Age: 53
End: 2018-09-27
Payer: COMMERCIAL

## 2018-10-17 ENCOUNTER — OFFICE VISIT (OUTPATIENT)
Dept: OBGYN CLINIC | Facility: MEDICAL CENTER | Age: 53
End: 2018-10-17
Payer: COMMERCIAL

## 2018-10-17 VITALS
BODY MASS INDEX: 42.15 KG/M2 | HEIGHT: 58 IN | HEART RATE: 85 BPM | DIASTOLIC BLOOD PRESSURE: 76 MMHG | WEIGHT: 200.8 LBS | SYSTOLIC BLOOD PRESSURE: 119 MMHG

## 2018-10-17 DIAGNOSIS — M76.821 TIBIALIS POSTERIOR TENDINITIS, RIGHT: ICD-10-CM

## 2018-10-17 DIAGNOSIS — M25.571 PAIN, JOINT, ANKLE AND FOOT, RIGHT: Primary | ICD-10-CM

## 2018-10-17 DIAGNOSIS — M72.2 PLANTAR FASCIITIS OF RIGHT FOOT: ICD-10-CM

## 2018-10-17 PROCEDURE — 99213 OFFICE O/P EST LOW 20 MIN: CPT | Performed by: FAMILY MEDICINE

## 2018-10-17 NOTE — PROGRESS NOTES
1  Pain, joint, ankle and foot, right  MRI ankle/heel right  wo contrast   2  Plantar fasciitis of right foot     3  Tibialis posterior tendinitis, right       Orders Placed This Encounter   Procedures    MRI ankle/heel right  wo contrast        Imaging Studies (I personally reviewed images in PACS and report):    Past diagnostics:  X-ray right foot 09/02/2018:  No acute osseous abnormality  Plantar spur mild  X-ray right ankle 09/02/2018:  No acute osseous abnormality    IMPRESSION:  Right ankle and heel pain x1 year  Multiple tender points  Unclear etiology    Differential diagnosis:  Stress fracture  Right foot posterior tibialis tendinitis  Plantar fasciitis     Interventions:  Physical therapy 4 weeks with no improvement    Return for follow-up after mri  Patient Instructions     I explained the patient that she has persistent ankle pain for approximately 1 year and has failed conserve interventions including physical therapy for approximately 4 weeks including home exercise program   As such we will further evaluate with MR I  Explained the patient that she does have tendinitis and plantar fasciitis that we will consider corticosteroid injection for plantar fascia at next visit after MRI  CHIEF COMPLAINT:  Right ankle pain    HPI:  Armida Robertson is a 52 y o  female  who presents for       Visit  09/05/2018:   Evaluation right ankle pain ongoing for approximately 1 year  Patient states that he did worsen within the last 1 week  She denies any recent or remote injury  The patient was initially evaluated in ER 09/02/2018 where she had x-ray performed on 09/02/2018 which was negative for fracture  Patient was given Ace bandage as well as crutches  She has been able transition to full weight-bearing and has some mild pain when walking  Today, patient states that she has approximately 30% improved her ankle  10/17/2018:   Follow-up right ankle and foot pain:  No significant improvement from last visit  Patient able to attend approximately 4 visit physical therapy due to scheduling constraints  She points to her medial proximal arch at the insertion point of plantar fascia and heel as source of greatest pain  She also has pain laterally posterior to her lateral malleolus  Patient has he had naturopathic if doctor who has suggested nutritional intervention for her  Patient has been performing her exercises at home approximately 4 weeks since her last visit  Review of Systems   Constitutional: Negative for chills and fever  Neurological: Negative for weakness and numbness           Following history reviewed and update:    Past Medical History:   Diagnosis Date    Adhesive capsulitis of shoulder     LEFT    Anemia     Anesthesia complication     difficulty awakening    Milk intolerance     Pneumonia     [facility] [address]     last seizure     Seizures ([facility] [address] [facility]     Sexually transmitted disease     Urinary incontinence     Wears dentures     full upper and partial lower - doesnt wear lower    Wears glasses     reading glasses     Past Surgical History:   Procedure Laterality Date    BLADDER SUSPENSION      CHOLECYSTECTOMY      Laparoscopic    INDUCED       [place] SH[facility]DR Courtney Boy Left 2016    Procedure: ARTHROSCOPY SHOULDER ,LYSIS OF ADHESIONS MANIPULATION UNDER ANESTHESIA; INJECTION OF LEFT SHOULDER;  Surgeon: Livia Hernandez MD;  Location: [facility];  Service: Orthopedics    TUBAL LIGATION       Social History   History   Alcohol Use    Yes     Comment: 1-2 x month, No alcohol use, per allscripts     History   Drug Use No     History   Smoking Status    Former Smoker    Quit date: 1991   Smokeless Tobacco    Never Used     Comment: Never a smoker, per [employer]     Family History   Problem Relation Age of Onset    Cancer Mother         carcinoma in Situ    Diabetes Mother     Hypertension Mother     Stroke Mother         syndrome    Brain cancer Family      Allergies   Allergen Reactions    Codeine Other (See Comments)     hallucinations    Lactose Intolerance (Gi)      Pt states she gets "gassy"    Penicillins Hives          Physical Exam  Constitutional:  see vital signs  Gen: well-developed, normocephalic/atraumatic, well-groomed  Eyes: No inflammation or discharge of conjunctiva or lids; sclera clear   Pharynx: no inflammation, lesion, or mass of lips  Neck: supple, no masses, non-distended  MSK: no inflammation, lesion, mass, or clubbing of nails and digits except for other than mentioned below  SKIN: no visible rashes or skin lesions  Pulmonary/Chest: Effort normal  No respiratory distress     NEURO: cranial nerves grossly intact  PSYCH:  Alert and oriented to person, place, and time; recent and remote memory intact; mood normal, no depression, anxiety, or agitation, judgment and insight good and intact      Ortho Exam  RIGHT ANKLE & FOOT EXAM  Observation  GAIT:  normal    Inspection  Erythema: no  Ecchymosis: no  Edema:  none      Tenderness  Proximal Fibula: no  (Maisonneuve frx)  AiTFL: no  (2cm proximal-medial to tip lateral malleolus 92% sens, 29% spec)  ATFL: no  CFL: no  PTFL: no  Achilles:  no  deltoid: No  Peroneal: +  Tibialis Anterior: no  Tibialis Posterior: +    Bony Tenderpoints:  Lateral Malleolus: no  Base of 5th MT: no  Medial Malleolus: no  Navicular: no  Talar dome: No    ROM  Dorsiflexion: intact  Plantarflexion: intact    Muscle Strength  Pronation: intact without pain  Supination: intact with pain    Tib-Fib Squeeze: negative  (rjeregdffjng-da-ncpxtdqyvrqboj squeeze; 26% sens, 88% spec; rule in test)    Calcaneal Squeeze: negative    Dorsiflexion (+) ER Stress Test: negative  (reproduce ATiFL mech; 71% sens, 63% spec)      RIGHT CALF EXAM:  No swelling erythema or increased warmth  No palpable cords  Tenderness: none  Negative Ofelia sign    RIGHT FOOT EXAM:  No swelling, erythema or increased warmth  Tenderness: + medial proximal arch and heel  ROM Toes extension: intact  ROM Toes flexion: intact  Strength Toes: 5/5 flex, ext  Sensation intact  Capillary refill intact  Metatarsal squeeze negative            Procedures

## 2018-10-17 NOTE — PATIENT INSTRUCTIONS
I explained the patient that she has persistent ankle pain for approximately 1 year and has failed conserve interventions including physical therapy for approximately 4 weeks including home exercise program   As such we will further evaluate with MR I  Explained the patient that she does have tendinitis and plantar fasciitis that we will consider corticosteroid injection for plantar fascia at next visit after MRI

## 2018-10-29 ENCOUNTER — HOSPITAL ENCOUNTER (OUTPATIENT)
Dept: MRI IMAGING | Facility: HOSPITAL | Age: 53
Discharge: HOME | End: 2018-10-29
Attending: FAMILY MEDICINE
Payer: COMMERCIAL

## 2018-10-29 DIAGNOSIS — M25.571 PAIN, JOINT, ANKLE AND FOOT, RIGHT: ICD-10-CM

## 2018-10-29 PROCEDURE — 73721 MRI JNT OF LWR EXTRE W/O DYE: CPT

## 2018-10-30 ENCOUNTER — OFFICE VISIT (OUTPATIENT)
Dept: FAMILY MEDICINE CLINIC | Facility: CLINIC | Age: 53
End: 2018-10-30
Payer: COMMERCIAL

## 2018-10-30 VITALS
DIASTOLIC BLOOD PRESSURE: 64 MMHG | TEMPERATURE: 98.3 F | WEIGHT: 201.2 LBS | HEIGHT: 58 IN | SYSTOLIC BLOOD PRESSURE: 100 MMHG | BODY MASS INDEX: 42.23 KG/M2

## 2018-10-30 DIAGNOSIS — J01.00 ACUTE NON-RECURRENT MAXILLARY SINUSITIS: Primary | ICD-10-CM

## 2018-10-30 DIAGNOSIS — R23.2 HOT FLASHES: ICD-10-CM

## 2018-10-30 PROCEDURE — 99213 OFFICE O/P EST LOW 20 MIN: CPT | Performed by: FAMILY MEDICINE

## 2018-10-30 PROCEDURE — 3008F BODY MASS INDEX DOCD: CPT | Performed by: FAMILY MEDICINE

## 2018-10-30 RX ORDER — MOXIFLOXACIN HYDROCHLORIDE 400 MG/1
400 TABLET ORAL DAILY
Qty: 7 TABLET | Refills: 0 | Status: SHIPPED | OUTPATIENT
Start: 2018-10-30 | End: 2018-11-06

## 2018-10-30 NOTE — PROGRESS NOTES
Assessment/Plan:       Diagnoses and all orders for this visit:    Acute non-recurrent maxillary sinusitis  -     moxifloxacin (AVELOX) 400 MG tablet; Take 1 tablet (400 mg total) by mouth daily for 7 days          Subjective:      Patient ID: Gabriella Troncoso is a 52 y o  female  Patient here with cough worse over the past 2 days along with headache chills sore throat  Patient just adopted to  The dogs  They are also sick  The following portions of the patient's history were reviewed and updated as appropriate: allergies, current medications, past family history, past medical history, past social history, past surgical history and problem list     Review of Systems   Constitutional: Positive for chills  HENT: Positive for sore throat  Eyes: Negative  Respiratory: Positive for cough  Cardiovascular: Negative  Gastrointestinal: Negative  Endocrine: Negative  Genitourinary: Negative  Musculoskeletal: Negative  Skin: Negative  Allergic/Immunologic: Negative  Neurological: Positive for headaches  Hematological: Negative  Psychiatric/Behavioral: Negative  Objective:      /64 (BP Location: Right arm, Patient Position: Sitting, Cuff Size: Adult)   Temp 98 3 °F (36 8 °C) (Tympanic)   Ht 4' 10" (1 473 m)   Wt 91 3 kg (201 lb 3 2 oz)   LMP  (LMP Unknown)   BMI 42 05 kg/m²          Physical Exam   Constitutional: She is oriented to person, place, and time  She appears well-developed and well-nourished  No distress  HENT:   Head: Normocephalic  Right Ear: External ear normal    Left Ear: External ear normal    Mouth/Throat: Oropharyngeal exudate present  Eyes: Pupils are equal, round, and reactive to light  EOM are normal  Right eye exhibits no discharge  Left eye exhibits no discharge  No scleral icterus  Neck: Normal range of motion  Neck supple  No thyromegaly present     Cardiovascular: Normal rate, regular rhythm, normal heart sounds and intact distal pulses  Exam reveals no gallop and no friction rub  No murmur heard  Pulmonary/Chest: Effort normal and breath sounds normal  No respiratory distress  She has no wheezes  She has no rales  She exhibits no tenderness  Abdominal: Soft  Bowel sounds are normal  She exhibits no distension  There is no tenderness  There is no rebound and no guarding  Musculoskeletal: Normal range of motion  She exhibits no edema or tenderness  Lymphadenopathy:     She has no cervical adenopathy  Neurological: She is oriented to person, place, and time  No cranial nerve deficit  She exhibits normal muscle tone  Coordination normal    Skin: Skin is warm and dry  No rash noted  She is not diaphoretic  No erythema  No pallor  Psychiatric: She has a normal mood and affect   Her behavior is normal  Judgment and thought content normal

## 2018-11-05 ENCOUNTER — APPOINTMENT (OUTPATIENT)
Dept: RADIOLOGY | Facility: CLINIC | Age: 53
End: 2018-11-05
Payer: COMMERCIAL

## 2018-11-05 ENCOUNTER — OFFICE VISIT (OUTPATIENT)
Dept: OBGYN CLINIC | Facility: MEDICAL CENTER | Age: 53
End: 2018-11-05
Payer: COMMERCIAL

## 2018-11-05 VITALS
DIASTOLIC BLOOD PRESSURE: 77 MMHG | BODY MASS INDEX: 41.77 KG/M2 | HEIGHT: 58 IN | WEIGHT: 199 LBS | HEART RATE: 68 BPM | SYSTOLIC BLOOD PRESSURE: 117 MMHG

## 2018-11-05 DIAGNOSIS — M25.561 RIGHT KNEE PAIN, UNSPECIFIED CHRONICITY: ICD-10-CM

## 2018-11-05 DIAGNOSIS — M22.2X1 PATELLOFEMORAL PAIN SYNDROME OF RIGHT KNEE: ICD-10-CM

## 2018-11-05 DIAGNOSIS — M17.11 PRIMARY OSTEOARTHRITIS OF RIGHT KNEE: ICD-10-CM

## 2018-11-05 DIAGNOSIS — M89.8X6 PAIN OF RIGHT TIBIA: ICD-10-CM

## 2018-11-05 DIAGNOSIS — M89.9 OSTEOCHONDRAL LESION OF TALAR DOME: Primary | ICD-10-CM

## 2018-11-05 DIAGNOSIS — M94.9 OSTEOCHONDRAL LESION OF TALAR DOME: Primary | ICD-10-CM

## 2018-11-05 PROCEDURE — 73564 X-RAY EXAM KNEE 4 OR MORE: CPT

## 2018-11-05 PROCEDURE — 99214 OFFICE O/P EST MOD 30 MIN: CPT | Performed by: FAMILY MEDICINE

## 2018-11-05 PROCEDURE — 73590 X-RAY EXAM OF LOWER LEG: CPT

## 2018-11-05 NOTE — PATIENT INSTRUCTIONS
Explained the patient that she does have evidence of what appears to be chronic talar dome osteochondral lesions ongoing for least 1 years since her last traumatic event to her ankle  At this time remains unclear as to what is to culprit for her ankle pain since she has multiple tender points as well as a concomitant diagnoses of plantar fasciitis  As such started patient  Cam walker boot with referral to orthopedic ankle surgeon to discuss possible need of microfracture or other invasive intervention for her osteochondral lesions  For now patient engaged in shared decision-making to err on the side of caution and remain nonweightbearing as much as possible for possible healing osteochondral lesions  Explained the patient that she is having difficulty with nonweightbearing using crutches then I would recommend partial weight-bearing in Cam walker boot at that time to prevent fall  Explained that falling could lead to significant problems including hip fracture  Patient expressed understanding agreed to see ankle surgeon to finalize definitive treatment plan  For patient's right knee pain I explained that this is due to patellofemoral pain syndrome and mild osteoarthritis of her right knee  Recommend start physical therapy for knee  May consider viscosupplementation injection in the future  For now will hold off on corticosteroid injection until after visit with ankle surgeon  You have been diagnosed with PFPS (patellofemoral pain syndrome) AKA runner's knee  Chondromalacia patella (softening and wear of knee cap cartilaginous cushion) is another name sometimes used interchangeably to capture the physiological effects of PFPS  The patella (knee cap) moves on a track along your femur and many people have improper tracking of the knee cap which results in popping off of the groove track, wear of the patella and causes pain under the knee cap and on the sides of the knee cap       Many people, even those in great shape or runners, are unaware that they have tight hamstrings or calf muscles, weak gluteus medius butt muscles, or a weak inner thigh quad muscle known as the VMO  Others have inherent risk factors such as flat feet (pes planus)  Treatment requires a rest phase and a rehabilitation phase  Patients with severe symptoms benefit greatest from complete rest to avoid running and at the very least start cross-training using stationary bike  Moderate to severe symptoms may require reduction of intensity (avoiding hills, steps) and length of training  Rehabilitation requires PT (physical therapy)- both home, and if ordered, formal at the PT office  PT is the curative treatment as it works over time to strengthen the appropriate muscles while your knee heals and some studies do show increased benefit of formal PT over home exercises  You should expect to see significant improvement after 6 weeks of daily therapy but be prepared to not see full results for 3 months  If you continue to run or stress your knee, then you will prevent healing and you should not expect to see much improvements even with PT  Taping and bracing offering some relief but no longterm cure  Steroid Injections help to reduce pain but there is no long-term benefit and there is some concerns that steroids may soften the cartilage under the knee cap even more  Other injections (PRP, stem cells) are experimental, generally not covered by insurance, and have expensive out of pocket costs  As such, I recommend considering these only after exhausting other options  Surgical treatment is only recommended to consider after failing 24 months of therapy      Long-term effects of untreated PFPS include softening and wearing away of the cartilage under the knee cap (chondromalacia patella) and eventually to patellofemoral arthritis (significant wear of cartilage cushion under the knee cap) and chronic pain that may require knee replacement  Home exercises can be found at: [email or web address]://www  [email or web address] [email or web address]/[email or web address]/[email or web address] html  (ABBEY Metcalf 2018)

## 2018-11-05 NOTE — PROGRESS NOTES
1  Osteochondral lesion of talar dome     2  Right knee pain, unspecified chronicity  CANCELED: XR knee 3 vw right non injury   3  Pain of right tibia  XR tibia fibula 2 vw right   4  Patellofemoral pain syndrome of right knee  [facility]   5  Primary osteoarthritis of right knee  [facility]     Orders Placed This Encounter   Procedures    XR tibia fibula 2 vw right    [facility]        Imaging Studies (I personally reviewed images in PACS and report):  X-ray right tibia-fibula 11/05/2018:  No acute osseous abnormality  X-ray right knee 11/05/2018:   8 mm posterior medial loose body  Mild-to-moderate osteoarthritis worst medial compartment and patellofemoral compartment    MRI right ankle 10/29/2018: IMPRESSION:  1  Chronic plantar fasciitis with associated spur  No evidence of tear  2   Medial and lateral subcentimeter osteochondral lesions without evidence of instability as above  3   Posterior tibialis mild tenosynovitis and mild insertional tendinosis without tear or evidence of PTT dysfunction  Cornuate navicular noted  Past diagnostics:  X-ray right foot 09/02/2018:  No acute osseous abnormality  Plantar spur mild  X-ray right ankle 09/02/2018:  No acute osseous abnormality    IMPRESSION:  Problem 1  Plantar fasciitis  Posterior tibialis tendinitis  Osteochondral lesion medial and lateral talar dome    Plan:  Referral to foot ankle surgeon for evaluation of osteochondral lesion and definitive care plan    Interventions:  Physical therapy 4 weeks with no improvement    Problem 2  Patellofemoral pain syndrome  Right knee osteoarthritis    Return for Follow-up with Dr Lesvia Judd  Patient Instructions   Explained the patient that she does have evidence of what appears to be chronic talar dome osteochondral lesions ongoing for least 1 years since her last traumatic event to her ankle    At this time remains unclear as to what is to culprit for her ankle pain since she has multiple tender points as well as a concomitant diagnoses of plantar fasciitis  As such started patient  Cam walker boot with referral to orthopedic ankle surgeon to discuss possible need of microfracture or other invasive intervention for her osteochondral lesions  For now patient engaged in shared decision-making to err on the side of caution and remain nonweightbearing as much as possible for possible healing osteochondral lesions  Explained the patient that she is having difficulty with nonweightbearing using crutches then I would recommend partial weight-bearing in Cam walker boot at that time to prevent fall  Explained that falling could lead to significant problems including hip fracture  Patient expressed understanding agreed to see ankle surgeon to finalize definitive treatment plan  For patient's right knee pain I explained that this is due to patellofemoral pain syndrome and mild osteoarthritis of her right knee  Recommend start physical therapy for knee  May consider viscosupplementation injection in the future  For now will hold off on corticosteroid injection until after visit with ankle surgeon  You have been diagnosed with PFPS (patellofemoral pain syndrome) AKA runner's knee  Chondromalacia patella (softening and wear of knee cap cartilaginous cushion) is another name sometimes used interchangeably to capture the physiological effects of PFPS  The patella (knee cap) moves on a track along your femur and many people have improper tracking of the knee cap which results in popping off of the groove track, wear of the patella and causes pain under the knee cap and on the sides of the knee cap  Many people, even those in great shape or runners, are unaware that they have tight hamstrings or calf muscles, weak gluteus medius butt muscles, or a weak inner thigh quad muscle known as the VMO  Others have inherent risk factors such as flat feet (pes planus)       Treatment requires a rest phase and a rehabilitation phase  Patients with severe symptoms benefit greatest from complete rest to avoid running and at the very least start cross-training using stationary bike  Moderate to severe symptoms may require reduction of intensity (avoiding hills, steps) and length of training  Rehabilitation requires PT (physical therapy)- both home, and if ordered, formal at the PT office  PT is the curative treatment as it works over time to strengthen the appropriate muscles while your knee heals and some studies do show increased benefit of formal PT over home exercises  You should expect to see significant improvement after 6 weeks of daily therapy but be prepared to not see full results for 3 months  If you continue to run or stress your knee, then you will prevent healing and you should not expect to see much improvements even with PT  Taping and bracing offering some relief but no longterm cure  Steroid Injections help to reduce pain but there is no long-term benefit and there is some concerns that steroids may soften the cartilage under the knee cap even more  Other injections (PRP, stem cells) are experimental, generally not covered by insurance, and have expensive out of pocket costs  As such, I recommend considering these only after exhausting other options  Surgical treatment is only recommended to consider after failing 24 months of therapy  Long-term effects of untreated PFPS include softening and wearing away of the cartilage under the knee cap (chondromalacia patella) and eventually to patellofemoral arthritis (significant wear of cartilage cushion under the knee cap) and chronic pain that may require knee replacement  Home exercises can be found at: https://www  [email or web address] org/afp/[email or web address]/[email or web address] html  (UTD Anup Duarte 2018)            CHIEF COMPLAINT:  Right ankle pain and complains of right knee pain    HPI:  Lara Tran is a 52 y o  female  who presents for       Visit  09/05/2018: Evaluation right ankle pain ongoing for approximately 1 year  Patient states that he did worsen within the last 1 week  She denies any recent or remote injury  The patient was initially evaluated in ER 09/02/2018 where she had x-ray performed on 09/02/2018 which was negative for fracture  Patient was given Ace bandage as well as crutches  She has been able transition to full weight-bearing and has some mild pain when walking  Today, patient states that she has approximately 30% improved her ankle  10/17/2018: Follow-up right ankle and foot pain:  No significant improvement from last visit  Patient able to attend approximately 4 visit physical therapy due to scheduling constraints  She points to her medial proximal arch at the insertion point of plantar fascia and heel as source of greatest pain  She also has pain laterally posterior to her lateral malleolus  Patient has he had naturopathic if doctor who has suggested nutritional intervention for her  Patient has been performing her exercises at home approximately 4 weeks since her last visit  11/05/2018: Follow-up right ankle and foot pain:  Last visit patient MRI evaluated for unclear etiology of her pain  It did reveal that she has a osteochondral lesions of lateral and medial talar dome as well as confirm posterior tibialis tendinitis and plantar fasciitis  Today, patient states she has minimal improvement since her 1st visit with me  She did physical therapy for 4 formal visits and has been compliant with a daily home exercise program as taught to her by formal physical therapy  She states she has no significant improvement since her 1st visit or with physical therapy  She continues to point to plantar fascia as well as lateral ankle as source of her pain  She also has pain in medially  During examination she points to her whole lower extremity ankle as source of her pain      Patient also complains today of concomitant right-sided knee pain  She points to the whole of her knee as source of pain  She does cade along her joint lines medially as well as laterally as source of her pain and her quadriceps tendon specifically  She denies any recent traumatic event to her right knee  Patient denies any locking given her knee  She does however explained that she has cracking her kneecap with bending the result in significant pain  Review of Systems   Constitutional: Negative for chills and fever  Neurological: Negative for weakness and numbness           Following history reviewed and update:    Past Medical History:   Diagnosis Date    Adhesive capsulitis of shoulder     LEFT    Anemia     Anesthesia complication     difficulty awakening    Milk intolerance     Pneumonia     [facility] disorder [facility] [address] )     last seizure     Seizures ([facility] [address] [facility]     Sexually transmitted disease     Urinary incontinence     Wears dentures     full upper and partial lower - doesnt wear lower    Wears glasses     reading glasses     Past Surgical History:   Procedure Laterality Date    BLADDER SUSPENSION      CHOLECYSTECTOMY      Laparoscopic    INDUCED       WI SIGIFREDO Hsieh Left 2016    Procedure: ARTHROSCOPY SHOULDER ,LYSIS OF ADHESIONS MANIPULATION UNDER ANESTHESIA; INJECTION OF LEFT SHOULDER;  Surgeon: Lashay Vital MD;  Location: [facility];  Service: Orthopedics    TUBAL LIGATION       Social History   History   Alcohol Use    Yes     Comment: 1-2 x month, No alcohol use, per allscripts     History   Drug Use No     History   Smoking Status    Former Smoker    Quit date: 1991   Smokeless Tobacco    Never Used     Comment: Never a smoker, per [employer]     Family History   Problem Relation Age of Onset    Cancer Mother         carcinoma in Situ    Diabetes Mother     Hypertension Mother     Stroke Mother         syndrome    Brain cancer Family      Allergies   Allergen Reactions    Codeine Other (See Comments)     hallucinations    Lactose Intolerance (Gi)      Pt states she gets "gassy"    Penicillins Hives          Physical Exam  Constitutional:  see vital signs  Gen: well-developed, normocephalic/atraumatic, well-groomed  Eyes: No inflammation or discharge of conjunctiva or lids; sclera clear   Pharynx: no inflammation, lesion, or mass of lips  Neck: supple, no masses, non-distended  MSK: no inflammation, lesion, mass, or clubbing of nails and digits except for other than mentioned below  SKIN: no visible rashes or skin lesions  Pulmonary/Chest: Effort normal  No respiratory distress     NEURO: cranial nerves grossly intact  PSYCH:  Alert and oriented to person, place, and time; recent and remote memory intact; mood normal, no depression, anxiety, or agitation, judgment and insight good and intact      Ortho Exam  RIGHT ANKLE & FOOT EXAM  Observation  GAIT:  normal    Inspection  Erythema: no  Ecchymosis: no  Edema:  none      Tenderness  Proximal Fibula: +  (Maisonneuve frx)  AiTFL: no  (2cm proximal-medial to tip lateral malleolus 92% sens, 29% spec)  ATFL: +  CFL: no  PTFL: no  Achilles:  no  deltoid: No  Peroneal: no  Tibialis Anterior: no  Tibialis Posterior: no    Bony Tenderpoints:  Lateral Malleolus: +  Base of 5th MT: no  Medial Malleolus: +  Navicular: no  Talar dome: No    ROM  Dorsiflexion: intact  Plantarflexion: intact    Muscle Strength  Pronation: intact without pain  Supination: intact without pain    Tib-Fib Squeeze: negative  (nuekpnnezqvs-ca-kodmrsapxrdlms squeeze; 26% sens, 88% spec; rule in test)    Calcaneal Squeeze: negative    Dorsiflexion (+) ER Stress Test: negative  (reproduce ATiFL mech; 71% sens, 63% spec)        RIGHT CALF EXAM:  No swelling erythema or increased warmth  No palpable cords  Tenderness: none  Negative Ofelia sign    RIGHT FOOT EXAM:  No swelling, erythema or increased warmth  Tenderness: none  ROM Toes extension: intact  ROM Toes flexion: intact  Strength Toes: 5/5 flex, ext  Sensation intact  Capillary refill intact  Metatarsal squeeze negative     Procedures

## 2018-11-12 ENCOUNTER — TELEPHONE (OUTPATIENT)
Dept: OBGYN CLINIC | Facility: HOSPITAL | Age: 53
End: 2018-11-12

## 2018-11-12 NOTE — TELEPHONE ENCOUNTER
Patient sees Dr Luis Benjamin  She is calling because on 09/05 she received orthotics in the office  She then received a bill for 42$ for the orthotics  [facility] told her that it was her responsibility and it should have been collected in the office  I spoke with Khoa Valenzuela and she advised me there was not a [employer] rep there that day  She states no one told her that she would have to pay for them  She wanted this brought to someone's attention  She did state that she signed the paper for the orthotics but no one went over it with her

## 2018-11-13 ENCOUNTER — OFFICE VISIT (OUTPATIENT)
Dept: OBGYN CLINIC | Facility: CLINIC | Age: 53
End: 2018-11-13
Payer: COMMERCIAL

## 2018-11-13 VITALS
BODY MASS INDEX: 41.59 KG/M2 | DIASTOLIC BLOOD PRESSURE: 87 MMHG | HEIGHT: 58 IN | HEART RATE: 86 BPM | SYSTOLIC BLOOD PRESSURE: 117 MMHG

## 2018-11-13 DIAGNOSIS — M76.71 PERONEAL TENDINITIS OF RIGHT LOWER LEG: ICD-10-CM

## 2018-11-13 DIAGNOSIS — M25.371 ANKLE INSTABILITY, RIGHT: Primary | ICD-10-CM

## 2018-11-13 PROCEDURE — 99213 OFFICE O/P EST LOW 20 MIN: CPT | Performed by: ORTHOPAEDIC SURGERY

## 2018-11-13 NOTE — PROGRESS NOTES
JAVIER Frost  Attending, Orthopaedic Surgery  Foot and [facility] [address] [facility]        ORTHOPAEDIC FOOT AND ANKLE CLINIC VISIT     Assessment:     Encounter Diagnoses   Name Primary?  Ankle instability, right Yes    Peroneal tendinitis of right lower leg        Plan:   · The patient verbalized understanding of exam findings and treatment plan  We engaged in the shared decision-making process and treatment options were discussed at length with the patient  Surgical and conservative management discussed today along with risks and benefits  · I spoke to the patient in detail about her condition not needing surgical intervention at this time  · She has no edema associated with these OCDs in her talus and they seem to be incidental findings  Many studies have shown that OCDs without edema do not benefit from surgery and are not associated with pain or disability  · She has attenuation in her ATFL  She has never rehabbed after her many ankle sprains  She has only done 2 sessions of PT for her ankle since her most recent injury  She states that her schedule does not allow her to do more  · She may DC the boot  · May take OTCs for pain control  · She may follow-up with Dr Kendrick Haji since she does not have any surgical pathology  If she does not improve with PT, her MRI does not demonstrate any abnormality that can be corrected with surgery  History of Present Illness:   Chief Complaint:   Chief Complaint   Patient presents with   [address] is a 52 y o  female who is being seen for right ankle pain x 2 years  She states that she sprains her right ankle dozens of times over the last 10 years  Pain is localized at lateral aspect of the right ankle with minimal radiating and described as sharp and severe  Patient denies numbness, tingling or radicular pain  Denies history of neuropathy    Patient does not smoke, does not have diabetes and does not take blood thinners  Patient denies family history of anesthesia complications and has not had any complications with anesthesia  She presents in the office with in high tide cam walker boot  Pain/symptom timing:  Worse during the day when active  Pain/symptom context:  Worse with activites and work  Pain/symptom modifying factors:  Rest makes better, activities make worse  Pain/symptom associated signs/symptoms: none    Prior treatment   · NSAIDsYes    · Injections No   · Bracing/Orthotics Yes   · Physical Therapy Yes     Orthopedic Surgical History:   none    Past Medical, Surgical and Social History:  Past Medical History:  has a past medical history of Adhesive capsulitis of shoulder; Anemia; Anesthesia complication; Milk intolerance; Pneumonia; Seizure disorder ([facility] ); Seizures ([facility] ); Sexually transmitted disease; Urinary incontinence; Wears dentures; and Wears glasses  Problem List:  does not have any pertinent problems on file  Past Surgical History:  has a past surgical history that includes Induced ; Tubal ligation; Cholecystectomy; Bladder suspension; and pr shldr arthroscop,part acromioplas (Left, 2016)  Family History: family history includes Brain cancer in her family; Cancer in her mother; Diabetes in her mother; Hypertension in her mother; Stroke in her mother  Social History:  reports that she quit smoking about 27 years ago  She has never used smokeless tobacco  She reports that she drinks alcohol  She reports that she does not use drugs  Current Medications: has a current medication list which includes the following prescription(s): naproxen and topiramate  Allergies: is allergic to codeine; lactose intolerance (gi); and penicillins       Review of Systems:  General- denies fever/chills  HEENT- denies hearing loss or sore throat  Eyes- denies eye pain or visual disturbances, denies red eyes  Respiratory- denies cough or SOB  Cardio- denies chest pain or palpitations  GI- denies abdominal pain  Endocrine- denies urinary frequency  Urinary- denies pain with urination  Musculoskeletal- Negative except noted above  Skin- denies rashes or wounds  Neurological- denies dizziness or headache  Psychiatric- denies anxiety or difficulty concentrating    Physical Exam:   /87 (BP Location: Left arm, Patient Position: Sitting, Cuff Size: Adult)   Pulse 86   Ht 4' 10" (1 473 m)   LMP  (LMP Unknown)   BMI 41 59 kg/m²   General/Constitutional: No apparent distress: well-nourished and well developed  Eyes: normal ocular motion  Lymphatic: No appreciable lymphadenopathy  Respiratory: Non-labored breathing  Vascular: No edema, swelling or tenderness, except as noted in detailed exam   Integumentary: No impressive skin lesions present, except as noted in detailed exam   Neuro: No ataxia or tremors noted  Psych: Normal mood and affect, oriented to person, place and time  Appropriate affect  Musculoskeletal: Normal, except as noted in detailed exam and in HPI  Examination    Right    Gait Normal   Musculoskeletal Tender to palpation at lateral aspect of the ankle along the peroneals    Skin Normal       Nails Normal    Range of Motion  20 degrees dorsiflexion, 40 degrees plantarflexion  Subtalar motion: normal    Stability Stable    Muscle Strength 5/5 tibialis anterior  5/5 gastrocnemius-soleus  5/5 posterior tibialis  4/5 peroneal/eversion strength  5/5 EHL  5/5 FHL    Neurologic Normal    Sensation Intact to light touch throughout sural, saphenous, superficial peroneal, deep peroneal and medial/lateral plantar nerve distributions  [place] 07 filament (10g) testing deferred  Cardiovascular Brisk capillary refill < 2 seconds,intact DP and PT pulses    Special Tests None      Imaging Studies:   3 views of the right ankle and right foot were taken, reviewed and interpreted independently that demonstrate no fracture or dislocation present  Reviewed by me personally      Scribe Attestation    I,:   Vu Quinones am acting as a scribe while in the presence of the attending physician :        I,:   Rachel Bryson MD personally performed the services described in this documentation    as scribed in my presence :                Doran Skipper Lachman, MD  Foot & Ankle Surgery   Department of [address] [place] [address]      I personally performed the service  Doran Skipper Lachman, MD

## 2018-11-14 ENCOUNTER — EVALUATION (OUTPATIENT)
Dept: PHYSICAL THERAPY | Facility: MEDICAL CENTER | Age: 53
End: 2018-11-14
Payer: COMMERCIAL

## 2018-11-14 DIAGNOSIS — M25.561 RIGHT KNEE PAIN, UNSPECIFIED CHRONICITY: Primary | ICD-10-CM

## 2018-11-14 PROCEDURE — 97140 MANUAL THERAPY 1/> REGIONS: CPT | Performed by: PHYSICAL THERAPIST

## 2018-11-14 PROCEDURE — 97112 NEUROMUSCULAR REEDUCATION: CPT | Performed by: PHYSICAL THERAPIST

## 2018-11-14 NOTE — PROGRESS NOTES
PT Evaluation     Today's date: 2018  Patient name: Lorraine Malik  : 1965  MRN: [id number]  Referring provider: Bret Blankenship  Dx:   Encounter Diagnosis   Name Primary?  Right knee pain, unspecified chronicity Yes                  Assessment  Impairments: abnormal coordination, abnormal muscle tone, abnormal or restricted ROM, abnormal movement, activity intolerance, impaired balance, impaired physical strength, lacks appropriate home exercise program, pain with function, poor posture  and poor body mechanics    Assessment details: Lorraine Malik is a 52 y o  y/o female who presents with complaints of chronic right knee pain  The patients greatest concerns are inability to kneel on the affected knee, walk, and stand without pain  Patient presents with right patellar hypomobility, decreased posterior chain tissue extensibility, decreased single leg balance on the right, and hip accessory weakness throughout  Primary movement impairment diagnosis of patellar hypomobility and poor posterior chain neuromuscular control which results in pathoanatomical symptoms of right knee pain, which limits her ability to perform functional activities without pain  Pt  will benefit from skilled PT services that includes manual therapy techniques to enhance tissue extensibility, neuromuscular re-education to facilitate motor control, therapeutic exercise to increase functional mobility, and modalities prn to reduce pain and inflammation    Understanding of Dx/Px/POC: good   Prognosis: good    Goals  Impairment Goals  - Pt I with initial HEP in 1-2 visits  - Improve ROM equal to contralateral side in 4-6 weeks  - Increase strength to 5/5 in all affected areas in 4-6 weeks    Functional Goals  - Increase Functional Status Measure to: 54 in 6-8 weeks  - Patient will be independent with comprehensive HEP in 6-8 weeks  - Ambulation is improved to prior level of function in 6-8 weeks  - Stair climbing is improved to prior level of function in 6-8 weeks  - Squatting is improved to prior level of function in 6-8 weeks    Plan  Patient would benefit from: PT eval  Planned modality interventions: thermotherapy: hydrocollator packs  Planned therapy interventions: joint mobilization, manual therapy, neuromuscular re-education, postural training, strengthening, stretching, therapeutic exercise, home exercise program, graded exercise, functional ROM exercises, flexibility, balance, balance/weight bearing training, activity modification, abdominal trunk stabilization and body mechanics training  Frequency: 2x week  Duration in weeks: 8  Treatment plan discussed with: patient      Subjective Evaluation    History of Present Illness  Onset date: May, 2018  Mechanism of injury: Patient reports that her right knee has been bothering her since last spring  She states that the left knee bothers her as well, but the right knee is worse  Patient reports falling frequently whether walking outside, or around the house because she does not take her time  She also has difficulty kneeling on the right knee and performing functional activities that require bending, standing, and walking  Patient would like to increase her walking tolerance and be able to function without pain  Pain  Current pain rating: 3  At best pain rating: 3  At worst pain ratin  Pain location: Right knee, medial/lateral PFJ  Quality: throbbing (stabbing )  Aggravating factors: standing      Diagnostic Tests  Abnormal x-ray: Mild degenerative changes  Treatments  Current treatment: physical therapy  Patient Goals  Patient goals for therapy: decreased pain, independence with ADLs/IADLs, increased strength and improved balance  Patient goal: Be able to walk 1 mile comfortably  Objective     Observations     Right Knee   Negative for deformity, edema and effusion       Palpation     Right Tenderness of the lateral gastrocnemius, medial gastrocnemius, rectus femoris and vastus lateralis  Tenderness     Right Knee   Tenderness in the inferior patella, lateral joint line and medial joint line  Neurological Testing     Sensation     Knee   Left Knee   Intact: light touch    Right Knee   Intact: light touch     Reflexes   Left   Patellar (L4): normal (2+)    Right   Patellar (L4): normal (2+)    Active Range of Motion   Left Knee   Normal active range of motion    Right Knee   Normal active range of motion    Mobility   Patellar Mobility:     Right Knee   Hypomobile: medial, superior and inferior   Tibiofemoral Mobility:   Left knee   Tibiofemoral tendons within functional limits include the anterior and posterior  Right knee Tibiofemoral tendons within functional limits include the anterior and posterior  Tibiofibular Mobility:   Right knee Hypomobile in the anterior tibiofibular tendon(s)  Patellar Static Positioning   Left Knee: WFL  Right Knee: [facility]    Strength/Myotome Testing     Left Hip   Planes of Motion   Flexion: 5  Extension: 3+  Abduction: 3+  Adduction: 3+  External rotation: 4  Internal rotation: 4    Isolated Muscles   Gluteus puma: 3+  Gluteus medius: 3+  Iliopsoas: 4    Right Hip   Planes of Motion   Flexion: 5  Extension: 3+  Abduction: 3+  Adduction: 3+  External rotation: 4  Internal rotation: 4    Isolated Muscles   Gluteus maximums: 3+  Gluteus medius: 3+  Iliopsoas: 4    Left Knee   Flexion: 4  Extension: 4  Quadriceps contraction: fair    Right Knee   Flexion: 3+  Extension: 4-  Quadriceps contraction: fair    Tests     Right Knee   Positive patellar compression, patella-femoral grind and peroneal nerve tension  Negative anterior Lachman, lateral Ro, medial Ro, valgus stress test at 0 degrees, valgus stress test at 30 degrees, varus stress test at 0 degrees and varus stress test at 30 degrees       Functional Assessment   Squat   Sitting toward left side, left tibial anterior translation beyond toes and right tibial anterior translation beyond toes  Single Leg Stance   Left: 10 seconds  Right: 3 seconds    General Comments     Knee Comments  No swelling noted      Precautions:  None    Daily Treatment Diary     Manual  11/14            Patellar mobs                                                                     Exercise Diary                           Bridges 10x 10s GR            Clams 30x 5s GR sup                                      Hamstring stretch             Quad stretch                                                                                                                                                                                          Modalities                                                                Casa Mantilla, PT  09/72/6927,5:89 PM

## 2018-11-15 ENCOUNTER — TELEPHONE (OUTPATIENT)
Dept: FAMILY MEDICINE CLINIC | Facility: CLINIC | Age: 53
End: 2018-11-15

## 2018-11-15 NOTE — TELEPHONE ENCOUNTER
Call patient  Check chest x-ray as well as labs which will include CMP CBC, C reactive protein, H pylori     Other option will be to set up spirometry also

## 2018-11-15 NOTE — TELEPHONE ENCOUNTER
Left message for patient , nothing urgent  Told her we are closing early and to call our office back after 9 tomorrow

## 2018-11-19 ENCOUNTER — OFFICE VISIT (OUTPATIENT)
Dept: PHYSICAL THERAPY | Facility: MEDICAL CENTER | Age: 53
End: 2018-11-19
Payer: COMMERCIAL

## 2018-11-19 DIAGNOSIS — M25.561 RIGHT KNEE PAIN, UNSPECIFIED CHRONICITY: Primary | ICD-10-CM

## 2018-11-19 PROCEDURE — 97110 THERAPEUTIC EXERCISES: CPT | Performed by: PHYSICAL THERAPIST

## 2018-11-19 PROCEDURE — 97112 NEUROMUSCULAR REEDUCATION: CPT | Performed by: PHYSICAL THERAPIST

## 2018-11-19 NOTE — PROGRESS NOTES
Daily Note     Today's date: 2018  Patient name: Syed Dockery  : 1965  MRN: [id number]  Referring provider: Mary Kong  Dx:   Encounter Diagnosis     ICD-10-CM    1  Right knee pain, unspecified chronicity M25 561                 Subjective:  Patient reports that her knee hurts today  Objective: See treatment diary below    Precautions:  None    Daily Treatment Diary     Manual             Patellar mobs  AZ                                                                   Exercise Diary              Bike  10'           SLR  3x10 2#           Bridges 10x 10s GR 10x 10s GR           Clams 30x 5s GR sup 30x 5s GR sup                                     Hamstring stretch  3x30s           Quad stretch  2'                                                                                                                                                                                        Modalities              MH                                           Assessment:  Patient presents with hamstring and quad tightness, as well as right knee pain with knee extension  Posterior chain neuromuscular control deficit throughout- addressed with neuromuscular re-training  Patient education on importance of HEP  Tolerated treatment well  Patient would benefit from continued PT  Plan: Continue per plan of care

## 2018-11-21 ENCOUNTER — OFFICE VISIT (OUTPATIENT)
Dept: PHYSICAL THERAPY | Facility: MEDICAL CENTER | Age: 53
End: 2018-11-21
Payer: COMMERCIAL

## 2018-11-21 DIAGNOSIS — M25.561 RIGHT KNEE PAIN, UNSPECIFIED CHRONICITY: Primary | ICD-10-CM

## 2018-11-21 PROCEDURE — 97140 MANUAL THERAPY 1/> REGIONS: CPT | Performed by: PHYSICAL THERAPIST

## 2018-11-21 PROCEDURE — 97110 THERAPEUTIC EXERCISES: CPT | Performed by: PHYSICAL THERAPIST

## 2018-11-21 NOTE — PROGRESS NOTES
Daily Note     Today's date: 2018  Patient name: Ar Daniels  : 1965  MRN: [id number]  Referring provider: John Fisher  Dx:   Encounter Diagnosis     ICD-10-CM    1  Right knee pain, unspecified chronicity M25 561                 Subjective:  Patient reports that she is sore today  Objective: See treatment diary below    Precautions:  None    Daily Treatment Diary     Manual            Patellar mobs  AZ AZ          ITB stretch   AZ                                                     Exercise Diary              Bike  10' 10'          SLR  3x10 2# 3x10 2#          Bridges 10x 10s GR 10x 10s GR 10x 10s GR          Clams 30x 5s GR sup 30x 5s GR sup 30x 5s GR sup                                    Hamstring stretch  3x30s 3x30s          Quad stretch  2' 2'                                                                                                                                                                                       Modalities              MH                                           Assessment:  Patient presents with right ITB, hamstring and quad tightness  Posterior chain neuromuscular control deficit throughout- addressed with NMRE  Patient education on importance of HEP  Tolerated treatment well  Patient would benefit from continued PT  Plan: Continue per plan of care

## 2018-11-27 ENCOUNTER — OFFICE VISIT (OUTPATIENT)
Dept: PHYSICAL THERAPY | Facility: MEDICAL CENTER | Age: 53
End: 2018-11-27
Payer: COMMERCIAL

## 2018-11-27 DIAGNOSIS — M25.561 RIGHT KNEE PAIN, UNSPECIFIED CHRONICITY: Primary | ICD-10-CM

## 2018-11-27 PROCEDURE — 97140 MANUAL THERAPY 1/> REGIONS: CPT | Performed by: PHYSICAL THERAPIST

## 2018-11-27 PROCEDURE — 97112 NEUROMUSCULAR REEDUCATION: CPT | Performed by: PHYSICAL THERAPIST

## 2018-11-27 PROCEDURE — 97110 THERAPEUTIC EXERCISES: CPT | Performed by: PHYSICAL THERAPIST

## 2018-11-27 NOTE — PROGRESS NOTES
Daily Note     Today's date: 2018  Patient name: Syed Dockery  : 1965  MRN: [id number]  Referring provider: Mary Kong  Dx:   Encounter Diagnosis     ICD-10-CM    1  Right knee pain, unspecified chronicity M25 561                 Subjective:  Patient reports that her knee is feeling slightly better  Objective: See treatment diary below    Precautions:  None    Daily Treatment Diary     Manual           Patellar mobs  AZ AZ AZ         ITB stretch   AZ AZ                                                    Exercise Diary              Bike  10' 10' 10'         SLR  3x10 2# 3x10 2# 3x10 2#         Bridges 10x 10s GR 10x 10s GR 10x 10s GR 10x 10s GR         Clams 30x 5s GR sup 30x 5s GR sup 30x 5s GR sup 30x 5s GR sup         LP    50# 3x10                      Hamstring stretch  3x30s 3x30s 3x30s          Quad stretch  2' 2' 2'                                                                                                                                                                                      Modalities              MH                                           Assessment:  Patient presents with right LE tissue extensibility deficits t/o right ITB, hamstring, quad, and gastroc  Posterior chain neuromuscular control deficit throughout- addressed with NMRE  Added LP today to increase LE strength  Tolerated treatment well  Patient would benefit from continued PT  Plan: Continue per plan of care

## 2018-11-29 ENCOUNTER — OFFICE VISIT (OUTPATIENT)
Dept: PHYSICAL THERAPY | Facility: MEDICAL CENTER | Age: 53
End: 2018-11-29
Payer: COMMERCIAL

## 2018-11-29 DIAGNOSIS — M25.561 RIGHT KNEE PAIN, UNSPECIFIED CHRONICITY: Primary | ICD-10-CM

## 2018-11-29 PROCEDURE — 97140 MANUAL THERAPY 1/> REGIONS: CPT | Performed by: PHYSICAL THERAPIST

## 2018-11-29 PROCEDURE — 97112 NEUROMUSCULAR REEDUCATION: CPT | Performed by: PHYSICAL THERAPIST

## 2018-11-29 PROCEDURE — 97110 THERAPEUTIC EXERCISES: CPT | Performed by: PHYSICAL THERAPIST

## 2018-11-29 NOTE — PROGRESS NOTES
Daily Note     Today's date: 2018  Patient name: Malia Mejia  : 1965  MRN: [id number]  Referring provider: Terri Vizcaino  Dx:   No diagnosis found  Subjective:  Patient reports that her knee is feeling slightly better  Objective: See treatment diary below    Precautions:  None    Daily Treatment Diary     Manual          Patellar mobs  AZ AZ AZ AZ        ITB stretch   AZ AZ AZ        Quad stretch     AZ                                      Exercise Diary              TM      25 miles        Bike  10' 10' 10'         SLR  3x10 2# 3x10 2# 3x10 2# 3x10 2#        Bridges 10x 10s GR 10x 10s GR 10x 10s GR 10x 10s GR 10x 10s GR        Clams 30x 5s GR sup 30x 5s GR sup 30x 5s GR sup 30x 5s GR sup 30x 5s GR sup        LP    50# 3x10 50# 3x10                     Hamstring stretch  3x30s 3x30s 3x30s  3x30s        Quad stretch  2' 2' 2' 2'                                                                                                                                                                                     Modalities              MH                                         Assessment:  Patient presents with right quad tightness and tissue extensibility deficits  Posterior chain neuromuscular control deficit throughout- addressed with NMRE  Patient demonstrated fatigue at approximately 1/4 mile on the treadmill with compensatory patterns  Tolerated treatment well  Patient would benefit from continued PT  Plan: Continue per plan of care

## 2018-12-03 ENCOUNTER — OFFICE VISIT (OUTPATIENT)
Dept: PHYSICAL THERAPY | Facility: MEDICAL CENTER | Age: 53
End: 2018-12-03
Payer: COMMERCIAL

## 2018-12-03 DIAGNOSIS — M25.561 RIGHT KNEE PAIN, UNSPECIFIED CHRONICITY: Primary | ICD-10-CM

## 2018-12-03 PROCEDURE — 97110 THERAPEUTIC EXERCISES: CPT | Performed by: PHYSICAL THERAPIST

## 2018-12-03 PROCEDURE — 97140 MANUAL THERAPY 1/> REGIONS: CPT | Performed by: PHYSICAL THERAPIST

## 2018-12-03 PROCEDURE — 97112 NEUROMUSCULAR REEDUCATION: CPT | Performed by: PHYSICAL THERAPIST

## 2018-12-03 NOTE — PROGRESS NOTES
Daily Note     Today's date: 12/3/2018  Patient name: Juanpablo Robertson  : 1965  MRN: [id number]  Referring provider: Rafael Mesa  Dx:   Encounter Diagnosis     ICD-10-CM    1  Right knee pain, unspecified chronicity M25 561                 Subjective:  Patient reports that her knee is feeling slightly better  Objective: See treatment diary below    Precautions:  None    Daily Treatment Diary     Manual  11/14 11/19 11/21 11/27 11/29 12/3       Patellar mobs  AZ AZ AZ AZ AZ       ITB stretch   AZ AZ AZ AZ       Quad stretch     AZ AZ                                     Exercise Diary              TM      25 miles   25 miles       Bike  10' 10' 10'         SLR  3x10 2# 3x10 2# 3x10 2# 3x10 2# 3x10 2#       Bridges 10x 10s GR 10x 10s GR 10x 10s GR 10x 10s GR 10x 10s GR 10x 10s GR       Clams 30x 5s GR sup 30x 5s GR sup 30x 5s GR sup 30x 5s GR sup 30x 5s GR sup 30x 5s GR sup       LP    50# 3x10 50# 3x10 50# 3x10                    Hamstring stretch  3x30s 3x30s 3x30s  3x30s 3x30s       Quad stretch  2' 2' 2' 2' 2'                                                                                                                                                                                    Modalities              MH                                         Assessment:  Patient presents with right quad and ITB tightness, as well as tissue extensibility deficits  Posterior chain neuromuscular control deficit throughout- addressed with NMRE  Patient demonstrated fatigue at approximately 1/4 mile on the treadmill with compensatory patterns  Tolerated treatment well  Patient would benefit from continued PT  Plan: Continue per plan of care

## 2018-12-05 ENCOUNTER — OFFICE VISIT (OUTPATIENT)
Dept: PHYSICAL THERAPY | Facility: MEDICAL CENTER | Age: 53
End: 2018-12-05
Payer: COMMERCIAL

## 2018-12-05 DIAGNOSIS — M25.561 RIGHT KNEE PAIN, UNSPECIFIED CHRONICITY: Primary | ICD-10-CM

## 2018-12-05 PROCEDURE — 97140 MANUAL THERAPY 1/> REGIONS: CPT | Performed by: PHYSICAL THERAPIST

## 2018-12-05 PROCEDURE — 97112 NEUROMUSCULAR REEDUCATION: CPT | Performed by: PHYSICAL THERAPIST

## 2018-12-05 PROCEDURE — 97110 THERAPEUTIC EXERCISES: CPT | Performed by: PHYSICAL THERAPIST

## 2018-12-05 NOTE — PROGRESS NOTES
Daily Note     Today's date: 2018  Patient name: Starr Dumont  : 1965  MRN: [id number]  Referring provider: Sulema Greene  Dx:   Encounter Diagnosis     ICD-10-CM    1  Right knee pain, unspecified chronicity M25 561                 Subjective:  Patient reports that her knee is feeling slightly better  Objective: See treatment diary below    Precautions:  None    Daily Treatment Diary     Manual  11/14 11/19 11/21 11/27 11/29 12/3 12/5      Patellar mobs  AZ AZ AZ AZ AZ AZ      ITB stretch   AZ AZ AZ AZ       Quad stretch     AZ AZ       STM/MFR       AZ                       Exercise Diary              TM      25 miles   25 miles   30 miles      Bike  10' 10' 10'         SLR  3x10 2# 3x10 2# 3x10 2# 3x10 2# 3x10 2# 3x10 3#      Bridges 10x 10s GR 10x 10s GR 10x 10s GR 10x 10s GR 10x 10s GR 10x 10s GR 10x 10s blue      Clams 30x 5s GR sup 30x 5s GR sup 30x 5s GR sup 30x 5s GR sup 30x 5s GR sup 30x 5s GR sup 30x 5s blue sup      LP    50# 3x10 50# 3x10 50# 3x10 np                   Hamstring stretch  3x30s 3x30s 3x30s  3x30s 3x30s 3x30s       Quad stretch  2' 2' 2' 2' 2' 2'                                                                                                                                                                                   Modalities              MH                                         Assessment:  Patient presents with right quad and ITB tightness, as well as tissue extensibility deficits  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

## 2018-12-10 ENCOUNTER — APPOINTMENT (OUTPATIENT)
Dept: PHYSICAL THERAPY | Facility: MEDICAL CENTER | Age: 53
End: 2018-12-10
Payer: COMMERCIAL

## 2018-12-10 ENCOUNTER — OFFICE VISIT (OUTPATIENT)
Dept: PHYSICAL THERAPY | Facility: MEDICAL CENTER | Age: 53
End: 2018-12-10
Payer: COMMERCIAL

## 2018-12-10 DIAGNOSIS — M25.561 RIGHT KNEE PAIN, UNSPECIFIED CHRONICITY: Primary | ICD-10-CM

## 2018-12-10 PROCEDURE — 97110 THERAPEUTIC EXERCISES: CPT | Performed by: PHYSICAL THERAPIST

## 2018-12-10 PROCEDURE — 97140 MANUAL THERAPY 1/> REGIONS: CPT | Performed by: PHYSICAL THERAPIST

## 2018-12-10 PROCEDURE — 97112 NEUROMUSCULAR REEDUCATION: CPT | Performed by: PHYSICAL THERAPIST

## 2018-12-10 NOTE — PROGRESS NOTES
Daily Note     Today's date: 12/10/2018  Patient name: Shakeel Cohen  : 1965  MRN: [id number]  Referring provider: Yara Armenta  Dx:   Encounter Diagnosis     ICD-10-CM    1  Right knee pain, unspecified chronicity M25 561                 Subjective:  Patient reports that her knee is feeling slightly better and she is able to walk more  Objective: See treatment diary below    Precautions:  None    Daily Treatment Diary     Manual  11/14 11/19 11/21 11/27 11/29 12/3 12/5 12/10     Patellar mobs  AZ AZ AZ AZ AZ AZ AZ     ITB stretch   AZ AZ AZ AZ       Quad stretch     AZ AZ       STM/MFR       AZ AZ                      Exercise Diary              TM      25 miles   25 miles   30 miles   40 miles     Bike  10' 10' 10'         SLR  3x10 2# 3x10 2# 3x10 2# 3x10 2# 3x10 2# 3x10 3# 3x10 3#     Bridges 10x 10s GR 10x 10s GR 10x 10s GR 10x 10s GR 10x 10s GR 10x 10s GR 10x 10s blue 10x 10s blue      Clams 30x 5s GR sup 30x 5s GR sup 30x 5s GR sup 30x 5s GR sup 30x 5s GR sup 30x 5s GR sup 30x 5s blue sup 30x 5s blue sup     LP    50# 3x10 50# 3x10 50# 3x10 np                   Hamstring stretch  3x30s 3x30s 3x30s  3x30s 3x30s 3x30s  3x30s      Quad stretch  2' 2' 2' 2' 2' 2' 2'                                                                                                                                                                                  Modalities              MH                                         Assessment:  Patient presents with right quad and ITB tightness, as well as tissue extensibility deficits  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

## 2018-12-12 ENCOUNTER — OFFICE VISIT (OUTPATIENT)
Dept: PHYSICAL THERAPY | Facility: MEDICAL CENTER | Age: 53
End: 2018-12-12
Payer: COMMERCIAL

## 2018-12-12 DIAGNOSIS — M25.561 RIGHT KNEE PAIN, UNSPECIFIED CHRONICITY: Primary | ICD-10-CM

## 2018-12-12 PROCEDURE — 97140 MANUAL THERAPY 1/> REGIONS: CPT | Performed by: PHYSICAL THERAPIST

## 2018-12-12 PROCEDURE — 97112 NEUROMUSCULAR REEDUCATION: CPT | Performed by: PHYSICAL THERAPIST

## 2018-12-12 PROCEDURE — 97110 THERAPEUTIC EXERCISES: CPT | Performed by: PHYSICAL THERAPIST

## 2018-12-12 NOTE — PROGRESS NOTES
Daily Note     Today's date: 2018  Patient name: Alise Rehman  : 1965  MRN: [id number]  Referring provider: Hilarie Carrel  Dx:   No diagnosis found  Subjective:  Patient reports that her knee is feeling much better  Objective: See treatment diary below    Precautions:  None    Daily Treatment Diary     Manual  11/14 11/19 11/21 11/27 11/29 12/3 12/5 12/10 12/12    Patellar mobs  AZ AZ AZ AZ AZ AZ AZ AZ    ITB stretch   AZ AZ AZ AZ       Quad stretch     AZ AZ       STM/MFR       AZ AZ AZ                     Exercise Diary              TM      25 miles   25 miles   30 miles   40 miles   30 miles    Bike  10' 10' 10'         SLR  3x10 2# 3x10 2# 3x10 2# 3x10 2# 3x10 2# 3x10 3# 3x10 3# 3x10 3#    Bridges 10x 10s GR 10x 10s GR 10x 10s GR 10x 10s GR 10x 10s GR 10x 10s GR 10x 10s blue 10x 10s blue  10x 10s blue    Clams 30x 5s GR sup 30x 5s GR sup 30x 5s GR sup 30x 5s GR sup 30x 5s GR sup 30x 5s GR sup 30x 5s blue sup 30x 5s blue sup 30x 5s blue sup    LP    50# 3x10 50# 3x10 50# 3x10 np  65# 3x10    Runner's stretch         3x30s    Hamstring stretch  3x30s 3x30s 3x30s  3x30s 3x30s 3x30s  3x30s  3x30s    Quad stretch  2' 2' 2' 2' 2' 2' 2' 2'                                                                                                                                                                                 Modalities              MH                                         Assessment:  Patient presents without right knee pain, but reported some fatigue on the treadmill after walking for 7 minutes  BP = 125/80 post treadmill  Patient stated that she is making an appointment with PCP to follow up about occasional chest pain she has been experiencing  Advised patient to do this right away  No chest discomfort t/o session and no pain in L UE or SOB  Right quad and ITB tightness, as well as tissue extensibility deficits present t/o R LE    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

## 2018-12-20 ENCOUNTER — OFFICE VISIT (OUTPATIENT)
Dept: FAMILY MEDICINE CLINIC | Facility: CLINIC | Age: 53
End: 2018-12-20
Payer: COMMERCIAL

## 2018-12-20 VITALS
WEIGHT: 197 LBS | DIASTOLIC BLOOD PRESSURE: 82 MMHG | BODY MASS INDEX: 41.35 KG/M2 | TEMPERATURE: 96.5 F | SYSTOLIC BLOOD PRESSURE: 128 MMHG | HEIGHT: 58 IN

## 2018-12-20 DIAGNOSIS — J40 BRONCHITIS: Primary | ICD-10-CM

## 2018-12-20 DIAGNOSIS — R07.2 PRECORDIAL PAIN: ICD-10-CM

## 2018-12-20 DIAGNOSIS — J40 BRONCHITIS: ICD-10-CM

## 2018-12-20 PROCEDURE — 99213 OFFICE O/P EST LOW 20 MIN: CPT | Performed by: FAMILY MEDICINE

## 2018-12-20 PROCEDURE — 3008F BODY MASS INDEX DOCD: CPT | Performed by: FAMILY MEDICINE

## 2018-12-20 PROCEDURE — 1036F TOBACCO NON-USER: CPT | Performed by: FAMILY MEDICINE

## 2018-12-20 RX ORDER — BENZONATATE 200 MG/1
200 CAPSULE ORAL 3 TIMES DAILY PRN
Qty: 30 CAPSULE | Refills: 0 | Status: SHIPPED | OUTPATIENT
Start: 2018-12-20 | End: 2018-12-20 | Stop reason: SDUPTHER

## 2018-12-20 RX ORDER — DOXYCYCLINE HYCLATE 100 MG/1
100 CAPSULE ORAL EVERY 12 HOURS SCHEDULED
Qty: 20 CAPSULE | Refills: 0 | Status: SHIPPED | OUTPATIENT
Start: 2018-12-20 | End: 2018-12-20 | Stop reason: SDUPTHER

## 2018-12-20 RX ORDER — BENZONATATE 200 MG/1
200 CAPSULE ORAL 3 TIMES DAILY PRN
Qty: 30 CAPSULE | Refills: 0 | Status: SHIPPED | OUTPATIENT
Start: 2018-12-20 | End: 2019-01-04

## 2018-12-20 RX ORDER — DOXYCYCLINE HYCLATE 100 MG/1
100 CAPSULE ORAL EVERY 12 HOURS SCHEDULED
Qty: 20 CAPSULE | Refills: 0 | Status: SHIPPED | OUTPATIENT
Start: 2018-12-20 | End: 2018-12-30

## 2018-12-20 NOTE — PROGRESS NOTES
Assessment/Plan:     Diagnoses and all orders for this visit:    Bronchitis  -     doxycycline hyclate (VIBRAMYCIN) 100 mg capsule; Take 1 capsule (100 mg total) by mouth every 12 (twelve) hours for 10 days  -     benzonatate (TESSALON) 200 MG capsule; Take 1 capsule (200 mg total) by mouth 3 (three) times a day as needed for cough    Precordial pain  -     Ambulatory referral to Cardiology; Future          Subjective:      Patient ID: Wendy Marks is a 52 y o  female  Patient is here with cough over the past week and half  Patient does have sputum production  Patient's sputum is yellow  Patient with congestion nasally  As patient does get some pain in her head when coughing  No vomiting or diarrhea  No fever noted  The patient using hot water with lemon and honey  The following portions of the patient's history were reviewed and updated as appropriate: allergies, current medications, past family history, past medical history, past social history, past surgical history and problem list     Review of Systems   Constitutional: Negative  Negative for fever  HENT: Positive for congestion, sinus pressure and sore throat  Negative for postnasal drip and rhinorrhea  Eyes: Negative  Respiratory: Positive for cough  Cardiovascular: Negative  Gastrointestinal: Negative  Endocrine: Negative  Genitourinary: Negative  Musculoskeletal: Negative  Skin: Negative  Allergic/Immunologic: Negative  Neurological: Negative  Hematological: Negative  Psychiatric/Behavioral: Negative  Objective:      /82 (BP Location: Right arm, Patient Position: Sitting, Cuff Size: Standard)   Temp (!) 96 5 °F (35 8 °C) (Tympanic)   Ht 4' 10" (1 473 m)   Wt 89 4 kg (197 lb)   LMP  (LMP Unknown)   BMI 41 17 kg/m²          Physical Exam   Constitutional: She is oriented to person, place, and time  She appears well-developed and well-nourished  No distress     HENT:   Head: Normocephalic  Right Ear: External ear normal    Left Ear: External ear normal    Mouth/Throat: Oropharynx is clear and moist  No oropharyngeal exudate  Eyes: Pupils are equal, round, and reactive to light  EOM are normal  Right eye exhibits no discharge  Left eye exhibits no discharge  No scleral icterus  Neck: Normal range of motion  Neck supple  No thyromegaly present  Cardiovascular: Normal rate, regular rhythm, normal heart sounds and intact distal pulses  Exam reveals no gallop and no friction rub  No murmur heard  Pulmonary/Chest: Effort normal  No respiratory distress  She has no wheezes  She has no rales  She exhibits no tenderness  Rhonchi right base   Abdominal: Soft  Bowel sounds are normal  She exhibits no distension  There is no tenderness  There is no rebound and no guarding  Musculoskeletal: Normal range of motion  She exhibits no edema or tenderness  Lymphadenopathy:     She has no cervical adenopathy  Neurological: She is oriented to person, place, and time  No cranial nerve deficit  She exhibits normal muscle tone  Coordination normal    Skin: Skin is warm and dry  No rash noted  She is not diaphoretic  No erythema  No pallor  Psychiatric: She has a normal mood and affect  Her behavior is normal  Judgment and thought content normal    Nursing note and vitals reviewed

## 2018-12-27 ENCOUNTER — OFFICE VISIT (OUTPATIENT)
Dept: PHYSICAL THERAPY | Facility: MEDICAL CENTER | Age: 53
End: 2018-12-27
Payer: COMMERCIAL

## 2018-12-27 DIAGNOSIS — M25.561 RIGHT KNEE PAIN, UNSPECIFIED CHRONICITY: Primary | ICD-10-CM

## 2018-12-27 PROCEDURE — 97140 MANUAL THERAPY 1/> REGIONS: CPT | Performed by: PHYSICAL THERAPIST

## 2018-12-27 PROCEDURE — 97110 THERAPEUTIC EXERCISES: CPT | Performed by: PHYSICAL THERAPIST

## 2018-12-27 PROCEDURE — 97112 NEUROMUSCULAR REEDUCATION: CPT | Performed by: PHYSICAL THERAPIST

## 2018-12-27 NOTE — PROGRESS NOTES
Daily Note     Today's date: 2018  Patient name: Lara Tran  : 1965  MRN: [id number]  Referring provider: Kunal Crespo  Dx:   Encounter Diagnosis     ICD-10-CM    1  Right knee pain, unspecified chronicity M25 561                 Subjective:  Patient reports that her knee is feeling much better  Objective: See treatment diary below    Precautions:  None    Daily Treatment Diary     Manual  11/14 11/19 11/21 11/27 11/29 12/3 12/5 12/10 12/12 12/27   Patellar mobs  AZ AZ AZ AZ AZ AZ AZ AZ    ITB stretch   AZ AZ AZ AZ       Quad stretch     AZ AZ       STM/MFR       AZ AZ AZ                     Exercise Diary              TM      25 miles   25 miles   30 miles   40 miles   30 miles   30 miles   Bike  10' 10' 10'         SLR  3x10 2# 3x10 2# 3x10 2# 3x10 2# 3x10 2# 3x10 3# 3x10 3# 3x10 3# 3x10 3#   Bridges 10x 10s GR 10x 10s GR 10x 10s GR 10x 10s GR 10x 10s GR 10x 10s GR 10x 10s blue 10x 10s blue  10x 10s blue 10x 10s blue   Clams 30x 5s GR sup 30x 5s GR sup 30x 5s GR sup 30x 5s GR sup 30x 5s GR sup 30x 5s GR sup 30x 5s blue sup 30x 5s blue sup 30x 5s blue sup 30x 5s blue sup   LP    50# 3x10 50# 3x10 50# 3x10 np  65# 3x10 65# 3x10   Runner's stretch         3x30s 3x30s   Hamstring stretch  3x30s 3x30s 3x30s  3x30s 3x30s 3x30s  3x30s  3x30s 3x30s   Quad stretch  2' 2' 2' 2' 2' 2' 2' 2' 2'                                                                                                                                                                                Modalities              MH                                         Assessment:  Patient presents without knee pain  She is able to walk   30 miles on the treadmill, but is limited by fatigue and low back tightness  Patient did follow up with PCP and has a cardiology appointment  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

## 2018-12-28 ENCOUNTER — DOCTOR'S OFFICE (OUTPATIENT)
Dept: URBAN - METROPOLITAN AREA CLINIC 136 | Facility: CLINIC | Age: 53
Setting detail: OPHTHALMOLOGY
End: 2018-12-28
Payer: COMMERCIAL

## 2018-12-28 ENCOUNTER — OPTICAL OFFICE (OUTPATIENT)
Dept: URBAN - METROPOLITAN AREA CLINIC 143 | Facility: CLINIC | Age: 53
Setting detail: OPHTHALMOLOGY
End: 2018-12-28
Payer: COMMERCIAL

## 2018-12-28 DIAGNOSIS — H52.03: ICD-10-CM

## 2018-12-28 DIAGNOSIS — H52.4: ICD-10-CM

## 2018-12-28 PROCEDURE — V2200 LENS SPHER BIFOC PLANO 4.00D: HCPCS | Performed by: OPTOMETRIST

## 2018-12-28 PROCEDURE — V2744 TINT PHOTOCHROMATIC LENS/ES: HCPCS | Performed by: OPTOMETRIST

## 2018-12-28 PROCEDURE — 92014 COMPRE OPH EXAM EST PT 1/>: CPT | Performed by: OPTOMETRIST

## 2018-12-28 PROCEDURE — V2020 VISION SVCS FRAMES PURCHASES: HCPCS | Performed by: OPTOMETRIST

## 2018-12-28 PROCEDURE — V2781 PROGRESSIVE LENS PER LENS: HCPCS | Performed by: OPTOMETRIST

## 2018-12-28 ASSESSMENT — SPHEQUIV_DERIVED
OD_SPHEQUIV: 0.75
OS_SPHEQUIV: 1.25

## 2018-12-28 ASSESSMENT — KERATOMETRY
OS_K1POWER_DIOPTERS: 46.75
OS_AXISANGLE_DEGREES: 158
OD_K2POWER_DIOPTERS: 47.25
OS_K2POWER_DIOPTERS: 47.00
OD_AXISANGLE_DEGREES: 105
OD_K1POWER_DIOPTERS: 46.50

## 2018-12-28 ASSESSMENT — REFRACTION_CURRENTRX
OD_OVR_VA: 20/
OD_OVR_VA: 20/
OS_OVR_VA: 20/
OS_ADD: +2.50
OD_SPHERE: -0.75
OD_OVR_VA: 20/
OD_VPRISM_DIRECTION: BF
OD_AXIS: 097
OS_SPHERE: +3.25
OS_OVR_VA: 20/
OD_CYLINDER: -0.50
OS_OVR_VA: 20/
OS_AXIS: 107
OD_ADD: +2.50
OS_CYLINDER: -3.00
OS_VPRISM_DIRECTION: BF

## 2018-12-28 ASSESSMENT — VISUAL ACUITY
OS_BCVA: 20/40-1
OD_BCVA: 20/40-2

## 2018-12-28 ASSESSMENT — TEAR BREAK UP TIME (TBUT)
OS_TBUT: 2+
OD_TBUT: 2+

## 2018-12-28 ASSESSMENT — REFRACTION_MANIFEST
OS_VA2: 20/
OS_VA3: 20/
OD_VA1: 20/
OS_VA3: 20/
OD_VA1: 20/25
OU_VA: 20/
OS_SPHERE: +0.75
OS_CYLINDER: SPH
OD_VA3: 20/
OD_VA2: 20/
OD_SPHERE: +0.75
OS_VA1: 20/25
OS_VA2: 20/20(J1+)
OD_VA3: 20/
OS_ADD: +2.50
OD_ADD: +2.50
OD_VA2: 20/20(J1+)
OD_CYLINDER: SPH
OU_VA: 20/25
OS_VA1: 20/

## 2018-12-28 ASSESSMENT — REFRACTION_AUTOREFRACTION
OS_SPHERE: +1.25
OD_CYLINDER: 0.00
OS_CYLINDER: 0.00
OD_SPHERE: +0.75

## 2018-12-28 ASSESSMENT — SUPERFICIAL PUNCTATE KERATITIS (SPK)
OD_SPK: 2+
OS_SPK: 1+

## 2018-12-28 ASSESSMENT — CONFRONTATIONAL VISUAL FIELD TEST (CVF)
OD_FINDINGS: FULL
OS_FINDINGS: FULL

## 2018-12-28 ASSESSMENT — AXIALLENGTH_DERIVED
OD_AL: 22.1534
OS_AL: 21.983

## 2018-12-28 ASSESSMENT — DECREASING TEAR LAKE - SEVERITY SCORE
OS_DEC_TEARLAKE: 2+
OD_DEC_TEARLAKE: 2+

## 2019-01-04 ENCOUNTER — APPOINTMENT (OUTPATIENT)
Dept: PHYSICAL THERAPY | Facility: MEDICAL CENTER | Age: 54
End: 2019-01-04
Payer: COMMERCIAL

## 2019-01-04 ENCOUNTER — OFFICE VISIT (OUTPATIENT)
Dept: FAMILY MEDICINE CLINIC | Facility: CLINIC | Age: 54
End: 2019-01-04
Payer: COMMERCIAL

## 2019-01-04 VITALS
OXYGEN SATURATION: 98 % | HEIGHT: 58 IN | BODY MASS INDEX: 42.61 KG/M2 | TEMPERATURE: 98.5 F | HEART RATE: 71 BPM | SYSTOLIC BLOOD PRESSURE: 102 MMHG | WEIGHT: 203 LBS | RESPIRATION RATE: 16 BRPM | DIASTOLIC BLOOD PRESSURE: 70 MMHG

## 2019-01-04 DIAGNOSIS — J40 BRONCHITIS: Primary | ICD-10-CM

## 2019-01-04 PROCEDURE — 99213 OFFICE O/P EST LOW 20 MIN: CPT | Performed by: FAMILY MEDICINE

## 2019-01-04 RX ORDER — CEFDINIR 300 MG/1
300 CAPSULE ORAL EVERY 12 HOURS SCHEDULED
Qty: 20 CAPSULE | Refills: 0 | Status: SHIPPED | OUTPATIENT
Start: 2019-01-04 | End: 2019-01-15

## 2019-01-04 RX ORDER — METHYLPREDNISOLONE 4 MG/1
TABLET ORAL
Qty: 21 TABLET | Refills: 0 | Status: SHIPPED | OUTPATIENT
Start: 2019-01-04 | End: 2019-01-18

## 2019-01-04 NOTE — PROGRESS NOTES
Assessment/Plan:     Diagnoses and all orders for this visit:    Bronchitis  -     cefdinir (OMNICEF) 300 mg capsule; Take 1 capsule (300 mg total) by mouth every 12 (twelve) hours for 10 days  -     Methylprednisolone 4 MG TBPK; Use as directed on package          Subjective:      Patient ID: Petra Gomez is a 52 y o  female  Patient is here to follow-up on bronchitis  Patient with ongoing cough  No fever noted  Patient did complete doxycycline the patient use Tessalon without any significant improvement        The following portions of the patient's history were reviewed and updated as appropriate: allergies, current medications, past family history, past medical history, past social history, past surgical history and problem list     Review of Systems   Constitutional: Positive for chills  HENT: Negative  Eyes: Negative  Respiratory: Positive for cough  Cardiovascular: Negative  Gastrointestinal: Negative  Endocrine: Negative  Genitourinary: Negative  Musculoskeletal: Negative  Skin: Negative  Allergic/Immunologic: Negative  Neurological: Negative  Hematological: Negative  Psychiatric/Behavioral: Negative  Objective:      /70 (BP Location: Right arm, Patient Position: Sitting, Cuff Size: Adult)   Pulse 71   Temp 98 5 °F (36 9 °C) (Tympanic)   Resp 16   Ht 4' 10" (1 473 m)   Wt 92 1 kg (203 lb)   LMP  (LMP Unknown)   SpO2 98%   BMI 42 43 kg/m²          Physical Exam   Constitutional: She is oriented to person, place, and time  She appears well-developed and well-nourished  No distress  HENT:   Head: Normocephalic  Right Ear: External ear normal    Left Ear: External ear normal    Mouth/Throat: Oropharynx is clear and moist  No oropharyngeal exudate  Eyes: Pupils are equal, round, and reactive to light  EOM are normal  Right eye exhibits no discharge  Left eye exhibits no discharge  No scleral icterus  Neck: Normal range of motion   Neck supple  No thyromegaly present  Cardiovascular: Normal rate, regular rhythm, normal heart sounds and intact distal pulses  Exam reveals no gallop and no friction rub  No murmur heard  Pulmonary/Chest: Effort normal  No respiratory distress  She has no wheezes  She has no rales  She exhibits no tenderness  Decreased breath sounds at bases   Abdominal: Soft  Bowel sounds are normal  She exhibits no distension  There is no tenderness  There is no rebound and no guarding  Musculoskeletal: Normal range of motion  She exhibits no edema or tenderness  Lymphadenopathy:     She has no cervical adenopathy  Neurological: She is oriented to person, place, and time  No cranial nerve deficit  She exhibits normal muscle tone  Coordination normal    Skin: Skin is warm and dry  No rash noted  She is not diaphoretic  No erythema  No pallor  Psychiatric: She has a normal mood and affect  Her behavior is normal  Judgment and thought content normal    Nursing note and vitals reviewed

## 2019-01-07 DIAGNOSIS — J01.00 ACUTE NON-RECURRENT MAXILLARY SINUSITIS: Primary | ICD-10-CM

## 2019-01-07 RX ORDER — CEFUROXIME AXETIL 500 MG/1
500 TABLET ORAL 2 TIMES DAILY
Qty: 20 TABLET | Refills: 0 | Status: SHIPPED | OUTPATIENT
Start: 2019-01-07 | End: 2019-01-15

## 2019-01-08 ENCOUNTER — OFFICE VISIT (OUTPATIENT)
Dept: PHYSICAL THERAPY | Facility: MEDICAL CENTER | Age: 54
End: 2019-01-08
Payer: COMMERCIAL

## 2019-01-08 DIAGNOSIS — M25.561 RIGHT KNEE PAIN, UNSPECIFIED CHRONICITY: Primary | ICD-10-CM

## 2019-01-08 PROCEDURE — 97110 THERAPEUTIC EXERCISES: CPT | Performed by: PHYSICAL THERAPIST

## 2019-01-08 PROCEDURE — 97140 MANUAL THERAPY 1/> REGIONS: CPT | Performed by: PHYSICAL THERAPIST

## 2019-01-08 PROCEDURE — 97112 NEUROMUSCULAR REEDUCATION: CPT | Performed by: PHYSICAL THERAPIST

## 2019-01-15 ENCOUNTER — OFFICE VISIT (OUTPATIENT)
Dept: FAMILY MEDICINE CLINIC | Facility: CLINIC | Age: 54
End: 2019-01-15
Payer: COMMERCIAL

## 2019-01-15 ENCOUNTER — APPOINTMENT (OUTPATIENT)
Dept: PHYSICAL THERAPY | Facility: MEDICAL CENTER | Age: 54
End: 2019-01-15
Payer: COMMERCIAL

## 2019-01-15 VITALS
DIASTOLIC BLOOD PRESSURE: 68 MMHG | WEIGHT: 197 LBS | TEMPERATURE: 99.2 F | HEIGHT: 58 IN | BODY MASS INDEX: 41.35 KG/M2 | SYSTOLIC BLOOD PRESSURE: 116 MMHG

## 2019-01-15 DIAGNOSIS — R06.2 WHEEZING: Primary | ICD-10-CM

## 2019-01-15 DIAGNOSIS — J40 BRONCHITIS: ICD-10-CM

## 2019-01-15 PROCEDURE — 99213 OFFICE O/P EST LOW 20 MIN: CPT | Performed by: FAMILY MEDICINE

## 2019-01-15 RX ORDER — ALBUTEROL SULFATE 90 UG/1
2 AEROSOL, METERED RESPIRATORY (INHALATION) EVERY 6 HOURS PRN
Qty: 18 G | Refills: 1 | Status: SHIPPED | OUTPATIENT
Start: 2019-01-15 | End: 2019-07-04

## 2019-01-15 RX ORDER — LEVOFLOXACIN 500 MG/1
500 TABLET, FILM COATED ORAL EVERY 24 HOURS
Qty: 10 TABLET | Refills: 0 | Status: SHIPPED | OUTPATIENT
Start: 2019-01-15 | End: 2019-01-21

## 2019-01-15 NOTE — LETTER
January 15, 2019     Patient: Wendy Marks   YOB: 1965   Date of Visit: 1/15/2019       To Whom it May Concern:    Wendy Marks is under my professional care  She was seen in my office on 1/15/2019  She may return to work on 01/21/2019  If you have any questions or concerns, please don't hesitate to call           Sincerely,          Yesenia Velasco DO        CC: No Recipients

## 2019-01-15 NOTE — PROGRESS NOTES
Assessment/Plan:  Patient will follow up if not resolved  Diagnoses and all orders for this visit:    Wheezing  -     albuterol (VENTOLIN HFA) 90 mcg/act inhaler; Inhale 2 puffs every 6 (six) hours as needed for wheezing  -     XR chest pa & lateral; Future  -     levofloxacin (LEVAQUIN) 500 mg tablet; Take 1 tablet (500 mg total) by mouth every 24 hours for 10 days    Bronchitis          Subjective:      Patient ID: Anirudh Hills is a 52 y o  female  Patient is here with ongoing cough and congestion  Patient with fevers  Patient feeling tired        The following portions of the patient's history were reviewed and updated as appropriate: allergies, current medications, past family history, past medical history, past social history, past surgical history and problem list     Review of Systems   Constitutional: Positive for fever  HENT: Positive for congestion and sinus pressure  Eyes: Negative  Respiratory: Positive for cough and wheezing  Cardiovascular: Negative  Gastrointestinal: Negative  Endocrine: Negative  Genitourinary: Negative  Musculoskeletal: Negative  Skin: Negative  Allergic/Immunologic: Negative  Neurological: Negative  Hematological: Negative  Psychiatric/Behavioral: Negative  Objective:      /68 (BP Location: Left arm, Patient Position: Sitting)   Temp 99 2 °F (37 3 °C)   Ht 4' 10" (1 473 m)   Wt 89 4 kg (197 lb)   LMP  (LMP Unknown)   BMI 41 17 kg/m²          Physical Exam   Constitutional: She is oriented to person, place, and time  She appears well-developed and well-nourished  No distress  HENT:   Head: Normocephalic  Right Ear: External ear normal    Left Ear: External ear normal    Mouth/Throat: Oropharyngeal exudate present  Eyes: Pupils are equal, round, and reactive to light  EOM are normal  Right eye exhibits no discharge  Left eye exhibits no discharge  No scleral icterus  Neck: Normal range of motion  Neck supple   No thyromegaly present  Cardiovascular: Normal rate, regular rhythm, normal heart sounds and intact distal pulses  Exam reveals no gallop and no friction rub  No murmur heard  Pulmonary/Chest: Effort normal  No respiratory distress  She has wheezes  She has no rales  She exhibits no tenderness  Abdominal: Soft  Bowel sounds are normal  She exhibits no distension  There is no tenderness  There is no rebound and no guarding  Musculoskeletal: Normal range of motion  She exhibits no edema or tenderness  Lymphadenopathy:     She has no cervical adenopathy  Neurological: She is oriented to person, place, and time  No cranial nerve deficit  She exhibits normal muscle tone  Coordination normal    Skin: Skin is warm and dry  No rash noted  She is not diaphoretic  No erythema  No pallor  Psychiatric: She has a normal mood and affect  Her behavior is normal  Judgment and thought content normal    Nursing note and vitals reviewed

## 2019-01-16 ENCOUNTER — APPOINTMENT (OUTPATIENT)
Dept: RADIOLOGY | Facility: MEDICAL CENTER | Age: 54
End: 2019-01-16
Payer: COMMERCIAL

## 2019-01-16 DIAGNOSIS — R06.2 WHEEZING: ICD-10-CM

## 2019-01-16 PROCEDURE — 71046 X-RAY EXAM CHEST 2 VIEWS: CPT

## 2019-01-18 ENCOUNTER — APPOINTMENT (EMERGENCY)
Dept: CT IMAGING | Facility: HOSPITAL | Age: 54
End: 2019-01-18
Payer: COMMERCIAL

## 2019-01-18 ENCOUNTER — TELEPHONE (OUTPATIENT)
Dept: FAMILY MEDICINE CLINIC | Facility: CLINIC | Age: 54
End: 2019-01-18

## 2019-01-18 ENCOUNTER — HOSPITAL ENCOUNTER (EMERGENCY)
Facility: HOSPITAL | Age: 54
Discharge: HOME | End: 2019-01-18
Attending: EMERGENCY MEDICINE | Admitting: EMERGENCY MEDICINE
Payer: COMMERCIAL

## 2019-01-18 VITALS
SYSTOLIC BLOOD PRESSURE: 100 MMHG | TEMPERATURE: 98.3 F | DIASTOLIC BLOOD PRESSURE: 44 MMHG | RESPIRATION RATE: 18 BRPM | WEIGHT: 198 LBS | HEART RATE: 120 BPM | BODY MASS INDEX: 41.38 KG/M2 | OXYGEN SATURATION: 95 %

## 2019-01-18 DIAGNOSIS — J45.909 ASTHMATIC BRONCHITIS: Primary | ICD-10-CM

## 2019-01-18 LAB
ANION GAP SERPL CALCULATED.3IONS-SCNC: 10 MMOL/L (ref 4–13)
BASOPHILS # BLD AUTO: 0.06 THOUSANDS/ΜL (ref 0–0.1)
BASOPHILS NFR BLD AUTO: 1 % (ref 0–1)
BUN SERPL-MCNC: 15 MG/DL (ref 5–25)
CALCIUM SERPL-MCNC: 8.9 MG/DL (ref 8.3–10.1)
CHLORIDE SERPL-SCNC: 109 MMOL/L (ref 100–108)
CO2 SERPL-SCNC: 25 MMOL/L (ref 21–32)
CREAT SERPL-MCNC: 1.07 MG/DL (ref 0.6–1.3)
EOSINOPHIL # BLD AUTO: 0.2 THOUSAND/ΜL (ref 0–0.61)
EOSINOPHIL NFR BLD AUTO: 2 % (ref 0–6)
ERYTHROCYTE [DISTWIDTH] IN BLOOD BY AUTOMATED COUNT: 12.9 % (ref 11.6–15.1)
GFR SERPL CREATININE-BSD FRML MDRD: 59 ML/MIN/1.73SQ M
GLUCOSE SERPL-MCNC: 91 MG/DL (ref 65–140)
HCT VFR BLD AUTO: 41.5 % (ref 34.8–46.1)
HGB BLD-MCNC: 13.3 G/DL (ref 11.5–15.4)
IMM GRANULOCYTES # BLD AUTO: 0.04 THOUSAND/UL (ref 0–0.2)
IMM GRANULOCYTES NFR BLD AUTO: 0 % (ref 0–2)
LYMPHOCYTES # BLD AUTO: 2.43 THOUSANDS/ΜL (ref 0.6–4.47)
LYMPHOCYTES NFR BLD AUTO: 26 % (ref 14–44)
MCH RBC QN AUTO: 29 PG (ref 26.8–34.3)
MCHC RBC AUTO-ENTMCNC: 32 G/DL (ref 31.4–37.4)
MCV RBC AUTO: 91 FL (ref 82–98)
MONOCYTES # BLD AUTO: 0.57 THOUSAND/ΜL (ref 0.17–1.22)
MONOCYTES NFR BLD AUTO: 6 % (ref 4–12)
NEUTROPHILS # BLD AUTO: 5.97 THOUSANDS/ΜL (ref 1.85–7.62)
NEUTS SEG NFR BLD AUTO: 65 % (ref 43–75)
NRBC BLD AUTO-RTO: 0 /100 WBCS
PLATELET # BLD AUTO: 242 THOUSANDS/UL (ref 149–390)
PMV BLD AUTO: 9.2 FL (ref 8.9–12.7)
POTASSIUM SERPL-SCNC: 4 MMOL/L (ref 3.5–5.3)
RBC # BLD AUTO: 4.58 MILLION/UL (ref 3.81–5.12)
SODIUM SERPL-SCNC: 144 MMOL/L (ref 136–145)
WBC # BLD AUTO: 9.27 THOUSAND/UL (ref 4.31–10.16)

## 2019-01-18 PROCEDURE — 85025 COMPLETE CBC W/AUTO DIFF WBC: CPT | Performed by: EMERGENCY MEDICINE

## 2019-01-18 PROCEDURE — 87801 DETECT AGNT MULT DNA AMPLI: CPT | Performed by: EMERGENCY MEDICINE

## 2019-01-18 PROCEDURE — 71250 CT THORAX DX C-: CPT

## 2019-01-18 PROCEDURE — 80048 BASIC METABOLIC PNL TOTAL CA: CPT | Performed by: EMERGENCY MEDICINE

## 2019-01-18 PROCEDURE — 99284 EMERGENCY DEPT VISIT MOD MDM: CPT

## 2019-01-18 PROCEDURE — 36415 COLL VENOUS BLD VENIPUNCTURE: CPT | Performed by: EMERGENCY MEDICINE

## 2019-01-18 PROCEDURE — 94640 AIRWAY INHALATION TREATMENT: CPT

## 2019-01-18 RX ORDER — PREDNISONE 20 MG/1
60 TABLET ORAL DAILY
Qty: 15 TABLET | Refills: 0 | Status: SHIPPED | OUTPATIENT
Start: 2019-01-18 | End: 2019-02-04

## 2019-01-18 RX ORDER — ALBUTEROL SULFATE 2.5 MG/3ML
5 SOLUTION RESPIRATORY (INHALATION) ONCE
Status: COMPLETED | OUTPATIENT
Start: 2019-01-18 | End: 2019-01-18

## 2019-01-18 RX ORDER — ALBUTEROL SULFATE 2.5 MG/3ML
2.5 SOLUTION RESPIRATORY (INHALATION) EVERY 4 HOURS PRN
Qty: 25 VIAL | Refills: 0 | Status: SHIPPED | OUTPATIENT
Start: 2019-01-18 | End: 2019-07-04

## 2019-01-18 RX ADMIN — IPRATROPIUM BROMIDE 0.5 MG: 0.5 SOLUTION RESPIRATORY (INHALATION) at 15:27

## 2019-01-18 RX ADMIN — ALBUTEROL SULFATE 5 MG: 2.5 SOLUTION RESPIRATORY (INHALATION) at 15:27

## 2019-01-18 NOTE — ED NOTES
Patient transported to [facility] [address], RN  01/18/19 [id number]

## 2019-01-18 NOTE — ED NOTES
Patient reports feeling as though her "heart is racing" and "I raul feel like Im high and I don't like it " Patient denies chest pain  Dr Gareth Starkey aware          Blaine Delgado, ETHAN  01/18/19 1640

## 2019-01-18 NOTE — TELEPHONE ENCOUNTER
Spoke with patient and she is having difficulties even talking to me  I convinced her to go to the ER  I will put Dr Jackie Meadows orders in so ER see's them

## 2019-01-18 NOTE — TELEPHONE ENCOUNTER
Call patient  Order CT scan of the chest without contrast   Start steroid pack  Continue with other medications    If significantly worsening go to ER

## 2019-01-18 NOTE — ED PROVIDER NOTES
History  Chief Complaint   Patient presents with    Cough     For the past 3 weeks  Seen by FP X3, on 3rd antibiotic and no improvement   Generalized Body Aches     61-year-old female with a history of GERD and hyperlipidemia presents to the emergency department with ongoing cough sometimes productive of yellow sputum over the last 6 weeks  Patient denies fevers or chills  She states that she had been to her family doctor 3 times and has just started her 3rd antibiotic  She also was giving a course of steroids and an inhaler which she states has not helped  She denies chest pain  She states that she does occasionally get short of breath  She is a healthcare worker  No recent travel  She denies smoking  History provided by:  Patient   used: No    Cough   Cough characteristics:  Productive  Sputum characteristics:  Yellow  Severity:  Unable to specify  Onset quality:  Gradual  Duration:  6 weeks  Timing:  Intermittent  Progression:  Unchanged  Chronicity:  New  Smoker: no    Context: upper respiratory infection    Relieved by:  Nothing  Worsened by:  Nothing  Ineffective treatments:  Beta-agonist inhaler (Steroids, antibiotics)  Associated symptoms: shortness of breath and wheezing    Associated symptoms: no chest pain, no chills, no diaphoresis, no ear fullness, no ear pain, no eye discharge, no fever, no headaches, no myalgias, no rash, no rhinorrhea, no sinus congestion, no sore throat and no weight loss    Shortness of breath:     Severity:  Unable to specify    Onset quality:  Unable to specify    Timing:  Unable to specify    Progression:  Unable to specify  Risk factors: no recent infection and no recent travel        Prior to Admission Medications   Prescriptions Last Dose Informant Patient Reported? Taking?    albuterol (VENTOLIN HFA) 90 mcg/act inhaler   No Yes   Sig: Inhale 2 puffs every 6 (six) hours as needed for wheezing   levofloxacin (LEVAQUIN) 500 mg tablet   No Yes   Sig: Take 1 tablet (500 mg total) by mouth every 24 hours for 10 days   topiramate (TOPAMAX) 100 mg tablet  Self Yes Yes   Sig: Take 100 mg by mouth 2 (two) times a day  Facility-Administered Medications: None       Past Medical History:   Diagnosis Date    Adhesive capsulitis of shoulder     LEFT    Anemia     Anesthesia complication     difficulty awakening    Milk intolerance     Pneumonia     Seizure disorder ([facility] [address] )     last seizure     Seizures ([facility] [address] )     Sexually transmitted disease     Urinary incontinence     Wears dentures     full upper and partial lower - doesnt wear lower    Wears glasses     reading glasses       Past Surgical History:   Procedure Laterality Date    BLADDER SUSPENSION      CHOLECYSTECTOMY      Laparoscopic    INDUCED       ID SHLDR Abhi Sloanhari Left 2016    Procedure: ARTHROSCOPY SHOULDER ,LYSIS OF ADHESIONS MANIPULATION UNDER ANESTHESIA; INJECTION OF LEFT SHOULDER;  Surgeon: Matias Tello MD;  Location: [facility];  Service: Orthopedics    TUBAL LIGATION         Family History   Problem Relation Age of Onset    Cancer Mother         carcinoma in Situ    Diabetes Mother     Hypertension Mother     Stroke Mother         syndrome    Brain cancer Family      I have reviewed and agree with the history as documented  Social History   Substance Use Topics    Smoking status: Former Smoker     Quit date: 1991    Smokeless tobacco: Never Used      Comment: Never a smoker, per [employer]    Alcohol use Yes      Comment: 1-2 x month, No alcohol use, per [employer]        Review of Systems   Constitutional: Negative  Negative for chills, diaphoresis, fatigue, fever and weight loss  HENT: Negative  Negative for congestion, ear pain, rhinorrhea and sore throat  Eyes: Negative  Negative for discharge, redness and itching  Respiratory: Positive for cough, shortness of breath and wheezing   Negative for apnea, chest tightness and stridor  Cardiovascular: Negative for chest pain, palpitations and leg swelling  Gastrointestinal: Negative  Negative for abdominal pain  Endocrine: Negative  Genitourinary: Negative  Negative for flank pain, frequency and urgency  Musculoskeletal: Negative  Negative for back pain and myalgias  Skin: Negative  Negative for rash  Allergic/Immunologic: Negative  Neurological: Negative  Negative for dizziness, syncope, weakness, light-headedness, numbness and headaches  Hematological: Negative  All other systems reviewed and are negative  Physical Exam  Physical Exam   Constitutional: She is oriented to person, place, and time  She appears well-developed and well-nourished  Non-toxic appearance  She does not have a sickly appearance  She does not appear ill  No distress  HENT:   Head: Normocephalic and atraumatic  Right Ear: Tympanic membrane and external ear normal    Left Ear: Tympanic membrane and external ear normal    Nose: Nose normal    Mouth/Throat: Oropharynx is clear and moist  No oropharyngeal exudate  Eyes: Pupils are equal, round, and reactive to light  Conjunctivae are normal  Right eye exhibits no discharge  Left eye exhibits no discharge  No scleral icterus  Neck: Normal range of motion and phonation normal  Neck supple  No neck rigidity  No edema and no erythema present  Cardiovascular: Normal rate, regular rhythm and normal heart sounds  Exam reveals no gallop and no friction rub  No murmur heard  Pulmonary/Chest: Effort normal  No accessory muscle usage or stridor  No tachypnea  No respiratory distress  She has wheezes in the right upper field, the right middle field, the right lower field, the left upper field, the left middle field and the left lower field  She has no rales  She exhibits no tenderness  Abdominal: Soft  Bowel sounds are normal  She exhibits no distension and no mass  There is no tenderness  No hernia  Musculoskeletal: She exhibits no edema  Lymphadenopathy:     She has no cervical adenopathy  Neurological: She is alert and oriented to person, place, and time  She has normal reflexes  She exhibits normal muscle tone  Skin: Skin is warm and dry  No rash noted  She is not diaphoretic  No erythema  No pallor  Psychiatric: She has a normal mood and affect  Nursing note and vitals reviewed  Vital Signs  ED Triage Vitals [01/18/19 1305]   Temperature Pulse Respirations Blood Pressure SpO2   98 °F (36 7 °C) 87 20 120/61 95 %      Temp Source Heart Rate Source Patient Position - Orthostatic VS BP Location FiO2 (%)   Oral -- Sitting Right arm --      Pain Score       No Pain           Vitals:    01/18/19 1305 01/18/19 1502 01/18/19 1630   BP: 120/61 113/65 (!) 100/44   Pulse: 87 66 (!) 120   Patient Position - Orthostatic VS: Sitting Lying Sitting       Visual Acuity      ED Medications  Medications   albuterol inhalation solution 5 mg (5 mg Nebulization Given 1/18/19 1527)   ipratropium (ATROVENT) 0 02 % inhalation solution 0 5 mg (0 5 mg Nebulization Given 1/18/19 1527)       Diagnostic Studies  Results Reviewed     Procedure Component Value Units Date/Time    Basic metabolic panel [[id number]]  (Abnormal) Collected:  01/18/19 1534    Lab Status:  Final result Specimen:  Blood from Arm, Left Updated:  01/18/19 1559     Sodium 144 mmol/L      Potassium 4 0 mmol/L      Chloride 109 (H) mmol/L      CO2 25 mmol/L      ANION GAP 10 mmol/L      BUN 15 mg/dL      Creatinine 1 07 mg/dL      Glucose 91 mg/dL      Calcium 8 9 mg/dL      eGFR 59 ml/min/1 73sq m     Narrative:         National Kidney Disease Education Program recommendations are as follows:  GFR calculation is accurate only with a steady state creatinine  Chronic Kidney disease less than 60 ml/min/1 73 sq  meters  Kidney failure less than 15 ml/min/1 73 sq  meters      Bordetella pertussis / parapertussis PCR [[id number]] Collected:  01/18/19 1542 Lab Status: In process Specimen:  Nasopharyngeal from Nose Updated:  01/18/19 1544    CBC and differential [[id number]] Collected:  01/18/19 1534    Lab Status:  Final result Specimen:  Blood from Arm, Left Updated:  01/18/19 1538     WBC 9 27 Thousand/uL      RBC 4 58 Million/uL      Hemoglobin 13 3 g/dL      Hematocrit 41 5 %      MCV 91 fL      MCH 29 0 pg      MCHC 32 0 g/dL      RDW 12 9 %      MPV 9 2 fL      Platelets 895 Thousands/uL      nRBC 0 /100 WBCs      Neutrophils Relative 65 %      Immat GRANS % 0 %      Lymphocytes Relative 26 %      Monocytes Relative 6 %      Eosinophils Relative 2 %      Basophils Relative 1 %      Neutrophils Absolute 5 97 Thousands/µL      Immature Grans Absolute 0 04 Thousand/uL      Lymphocytes Absolute 2 43 Thousands/µL      Monocytes Absolute 0 57 Thousand/µL      Eosinophils Absolute 0 20 Thousand/µL      Basophils Absolute 0 06 Thousands/µL                  CT chest without contrast   Final Result by Mariela Woo MD (01/18 1659)         1  No focal consolidations identified  2  Multiple bilateral subcentimeter pulmonary nodules, largest measuring 3 mm  Based on current Fleischner Society 2017 Guidelines on incidental pulmonary nodule, because the patient is considered high risk for lung cancer, 12 month follow-up    non-contrast chest CT is recommended  Workstation performed: [id number]                    Procedures  Procedures       Phone Contacts  ED Phone Contact    ED Course  ED Course as of Jan 18 1735 Fri Jan 18, 2019   1709 Patient states she did not like the way the albuterol made her feel  She did become tachycardic and very jittery  Her lungs are clear  She states that she would be willing to use a nebulizer if it helps her symptoms at home  I told her I did give her a dose for an asthmatic and that could decrease the dose for use at home                                  MDM  Number of Diagnoses or Management Options  Diagnosis management comments: 78-year-old female presents with ongoing cough for the last 6 weeks  She has been treated with 3 courses of antibiotics, steroids and inhaler without relief  She did have an outpatient chest x-ray done 2 days ago which has not been read yet, however I did review the film and I did not see any signs of pneumonia or congestive heart failure  Patient is wheezing on exam but is otherwise comfortable  Will check basic labs, pr tussis swab, will CT chest to rule out the possibility of underlying pneumonia not seen on chest x-ray  Will give DuoNeb here and reassess  Amount and/or Complexity of Data Reviewed  Clinical lab tests: ordered and reviewed  Tests in the radiology section of CPT®: ordered and reviewed      CritCare Time    Disposition  Final diagnoses:   Asthmatic bronchitis     Time reflects when diagnosis was documented in both MDM as applicable and the Disposition within this note     Time User Action Codes Description Comment    1/18/2019  5:31 PM Bobbi Done A Add [J45 [id number]] Asthmatic bronchitis       ED Disposition     ED Disposition Condition Comment    Discharge  [facility] discharge to home/self care      Condition at discharge: Good        Follow-up Information     Follow up With Specialties Details Why Contact Info    Ania Grigsby DO Family Medicine Schedule an appointment as soon as possible for a visit in 3 days Or return to the emergency department with worsening symptoms [place] [address]  [facility] [address]  [phone]            Patient's Medications   Discharge Prescriptions    ALBUTEROL (2 5 MG/3 ML) 0 083 % NEBULIZER SOLUTION    Take 1 vial (2 5 mg total) by nebulization every 4 (four) hours as needed for wheezing or shortness of breath       Start Date: 1/18/2019 End Date: --       Order Dose: 2 5 mg       Quantity: 25 vial    Refills: 0    PREDNISONE 20 MG TABLET    Take 3 tablets (60 mg total) by mouth daily       Start Date: 1/18/2019 End Date: -- Order Dose: 60 mg       Quantity: 15 tablet    Refills: 0       Outpatient Discharge Orders  Nebulizer         ED Provider  Electronically Signed by           Yeimi Burks DO  01/18/19 7435

## 2019-01-20 LAB
B PARAPERT DNA SPEC QL NAA+PROBE: NOT DETECTED
B PERT DNA SPEC QL NAA+PROBE: NOT DETECTED

## 2019-01-21 ENCOUNTER — OFFICE VISIT (OUTPATIENT)
Dept: FAMILY MEDICINE CLINIC | Facility: CLINIC | Age: 54
End: 2019-01-21
Payer: COMMERCIAL

## 2019-01-21 VITALS
DIASTOLIC BLOOD PRESSURE: 78 MMHG | TEMPERATURE: 99.4 F | BODY MASS INDEX: 41.14 KG/M2 | HEIGHT: 58 IN | SYSTOLIC BLOOD PRESSURE: 104 MMHG | WEIGHT: 196 LBS

## 2019-01-21 DIAGNOSIS — K21.00 GASTROESOPHAGEAL REFLUX DISEASE WITH ESOPHAGITIS: ICD-10-CM

## 2019-01-21 DIAGNOSIS — R06.2 WHEEZING: Primary | ICD-10-CM

## 2019-01-21 DIAGNOSIS — J40 BRONCHITIS: ICD-10-CM

## 2019-01-21 PROCEDURE — 99213 OFFICE O/P EST LOW 20 MIN: CPT | Performed by: FAMILY MEDICINE

## 2019-01-21 RX ORDER — BUDESONIDE AND FORMOTEROL FUMARATE DIHYDRATE 160; 4.5 UG/1; UG/1
2 AEROSOL RESPIRATORY (INHALATION) 2 TIMES DAILY
Qty: 1 INHALER | Refills: 2 | Status: SHIPPED | OUTPATIENT
Start: 2019-01-21 | End: 2019-01-21

## 2019-01-21 RX ORDER — FLUTICASONE FUROATE AND VILANTEROL 100; 25 UG/1; UG/1
1 POWDER RESPIRATORY (INHALATION) DAILY
Qty: 1 INHALER | Refills: 1 | Status: SHIPPED | OUTPATIENT
Start: 2019-01-21 | End: 2019-02-04 | Stop reason: SDUPTHER

## 2019-01-21 RX ORDER — OMEPRAZOLE 20 MG/1
20 CAPSULE, DELAYED RELEASE ORAL DAILY
Qty: 30 CAPSULE | Refills: 2 | Status: SHIPPED | OUTPATIENT
Start: 2019-01-21 | End: 2019-02-04 | Stop reason: SDUPTHER

## 2019-01-21 NOTE — LETTER
January 21, 2019     Patient: Daphney Ortiz   YOB: 1965   Date of Visit: 1/21/2019       To Whom it May Concern:    Daphney Ortiz is under my professional care  She was seen in my office on 1/21/2019 01/23/2019 She may return to work on 01/23/2019  If you have any questions or concerns, please don't hesitate to call           Sincerely,          Shawnee Lou DO        CC: No Recipients

## 2019-01-21 NOTE — PROGRESS NOTES
Assessment/Plan:  Patient will complete antibiotic course  Patient will complete prednisone  Patient will use loratadine daily as directed  Patient will use Symbicort twice daily and rinse mouth  Patient will start Prilosec daily  Diagnoses and all orders for this visit:    Wheezing  -     budesonide-formoterol (SYMBICORT) 160-4 5 mcg/act inhaler; Inhale 2 puffs 2 (two) times a day Rinse mouth after use  Bronchitis    Gastroesophageal reflux disease with esophagitis  -     omeprazole (PriLOSEC) 20 mg delayed release capsule; Take 1 capsule (20 mg total) by mouth daily          Subjective:      Patient ID: Christiano Cool is a 52 y o  female  Patient is here to follow-up from ER for wheezing  Patient had CT scan which was unremarkable other than some small pulmonary nodules roughly 3 mm in size  Patient had nebulizer treatment  Patient was prescribed nebulizer but was unable to get nebulizer covered  Patient is using prednisone 60 mg daily per routine from ER  The following portions of the patient's history were reviewed and updated as appropriate: allergies, current medications, past family history, past medical history, past social history, past surgical history and problem list     Review of Systems   Constitutional: Negative  HENT: Negative  Eyes: Negative  Respiratory: Positive for cough and wheezing  Cardiovascular: Negative  Gastrointestinal: Negative  Endocrine: Negative  Genitourinary: Negative  Musculoskeletal: Negative  Skin: Negative  Allergic/Immunologic: Negative  Neurological: Negative  Hematological: Negative  Psychiatric/Behavioral: Negative  Objective:      /78 (BP Location: Left arm, Patient Position: Sitting, Cuff Size: Large)   Temp 99 4 °F (37 4 °C)   Ht 4' 10" (1 473 m)   Wt 88 9 kg (196 lb)   LMP  (LMP Unknown)   BMI 40 96 kg/m²          Physical Exam   Constitutional: She is oriented to person, place, and time   She appears well-developed and well-nourished  No distress  HENT:   Head: Normocephalic  Right Ear: External ear normal    Left Ear: External ear normal    Mouth/Throat: Oropharynx is clear and moist  No oropharyngeal exudate  Eyes: Pupils are equal, round, and reactive to light  EOM are normal  Right eye exhibits no discharge  Left eye exhibits no discharge  No scleral icterus  Neck: Normal range of motion  Neck supple  No thyromegaly present  Cardiovascular: Normal rate, regular rhythm, normal heart sounds and intact distal pulses  Exam reveals no gallop and no friction rub  No murmur heard  Pulmonary/Chest: Effort normal and breath sounds normal  No respiratory distress  She has no wheezes  She has no rales  She exhibits no tenderness  Musculoskeletal: Normal range of motion  She exhibits no edema or tenderness  Lymphadenopathy:     She has no cervical adenopathy  Neurological: She is oriented to person, place, and time  No cranial nerve deficit  She exhibits normal muscle tone  Coordination normal    Skin: Skin is warm and dry  No rash noted  She is not diaphoretic  No erythema  No pallor  Psychiatric: She has a normal mood and affect  Her behavior is normal  Judgment and thought content normal    Nursing note and vitals reviewed

## 2019-01-23 ENCOUNTER — OPTICAL OFFICE (OUTPATIENT)
Dept: URBAN - METROPOLITAN AREA CLINIC 143 | Facility: CLINIC | Age: 54
Setting detail: OPHTHALMOLOGY
End: 2019-01-23

## 2019-01-23 DIAGNOSIS — H52.03: ICD-10-CM

## 2019-01-23 PROCEDURE — V2020 VISION SVCS FRAMES PURCHASES: HCPCS | Performed by: OPTOMETRIST

## 2019-02-04 ENCOUNTER — OFFICE VISIT (OUTPATIENT)
Dept: FAMILY MEDICINE CLINIC | Facility: CLINIC | Age: 54
End: 2019-02-04
Payer: COMMERCIAL

## 2019-02-04 VITALS
WEIGHT: 202.6 LBS | SYSTOLIC BLOOD PRESSURE: 114 MMHG | BODY MASS INDEX: 42.53 KG/M2 | DIASTOLIC BLOOD PRESSURE: 70 MMHG | TEMPERATURE: 98.8 F | HEIGHT: 58 IN

## 2019-02-04 DIAGNOSIS — R06.2 WHEEZING: Primary | ICD-10-CM

## 2019-02-04 DIAGNOSIS — K21.00 GASTROESOPHAGEAL REFLUX DISEASE WITH ESOPHAGITIS: ICD-10-CM

## 2019-02-04 PROCEDURE — 1036F TOBACCO NON-USER: CPT | Performed by: FAMILY MEDICINE

## 2019-02-04 PROCEDURE — 99213 OFFICE O/P EST LOW 20 MIN: CPT | Performed by: FAMILY MEDICINE

## 2019-02-04 PROCEDURE — 3008F BODY MASS INDEX DOCD: CPT | Performed by: FAMILY MEDICINE

## 2019-02-04 RX ORDER — OMEPRAZOLE 20 MG/1
20 CAPSULE, DELAYED RELEASE ORAL DAILY
Qty: 30 CAPSULE | Refills: 2 | Status: SHIPPED | OUTPATIENT
Start: 2019-02-04 | End: 2019-05-29 | Stop reason: SDUPTHER

## 2019-02-04 RX ORDER — FLUTICASONE FUROATE AND VILANTEROL 100; 25 UG/1; UG/1
1 POWDER RESPIRATORY (INHALATION) DAILY
Qty: 1 INHALER | Refills: 3 | Status: SHIPPED | OUTPATIENT
Start: 2019-02-04 | End: 2019-07-03 | Stop reason: SDUPTHER

## 2019-02-04 NOTE — PROGRESS NOTES
Assessment/Plan:     Diagnoses and all orders for this visit:    Wheezing  -     fluticasone-vilanterol (BREO ELLIPTA) 100-25 mcg/inh inhaler; Inhale 1 puff daily Rinse mouth after use  Gastroesophageal reflux disease with esophagitis  -     omeprazole (PriLOSEC) 20 mg delayed release capsule; Take 1 capsule (20 mg total) by mouth daily          Subjective:      Patient ID: Lara Tran is a 52 y o  female  Patient follow-up on cough and wheezing  Patient 98% better  The patient has noticed some eye irritation beginning today  No fevers or chills  Patient is on Breo daily  Patient is on omeprazole daily  No GERD symptoms noted  Patient is using Claritin daily  The following portions of the patient's history were reviewed and updated as appropriate: allergies, current medications, past family history, past medical history, past social history, past surgical history and problem list     Review of Systems   Constitutional: Negative  HENT: Negative  Eyes: Negative  Respiratory: Negative  Cardiovascular: Negative  Gastrointestinal: Negative  Endocrine: Negative  Genitourinary: Negative  Musculoskeletal: Negative  Skin: Negative  Allergic/Immunologic: Negative  Neurological: Negative  Hematological: Negative  Psychiatric/Behavioral: Negative  Objective:      /70 (BP Location: Left arm, Patient Position: Sitting, Cuff Size: Large)   Temp 98 8 °F (37 1 °C) (Tympanic)   Ht 4' 10" (1 473 m)   Wt 91 9 kg (202 lb 9 6 oz)   LMP  (LMP Unknown)   BMI 42 34 kg/m²          Physical Exam   Constitutional: She is oriented to person, place, and time  She appears well-developed and well-nourished  No distress  HENT:   Head: Normocephalic  Right Ear: External ear normal    Left Ear: External ear normal    Mouth/Throat: Oropharynx is clear and moist  No oropharyngeal exudate  Eyes: Pupils are equal, round, and reactive to light   EOM are normal  Right eye exhibits no discharge  Left eye exhibits no discharge  No scleral icterus  Neck: Normal range of motion  Neck supple  No thyromegaly present  Cardiovascular: Normal rate, regular rhythm, normal heart sounds and intact distal pulses  Exam reveals no gallop and no friction rub  No murmur heard  Pulmonary/Chest: Effort normal and breath sounds normal  No respiratory distress  She has no wheezes  She has no rales  She exhibits no tenderness  Musculoskeletal: Normal range of motion  She exhibits no edema or tenderness  Lymphadenopathy:     She has no cervical adenopathy  Neurological: She is oriented to person, place, and time  No cranial nerve deficit  She exhibits normal muscle tone  Coordination normal    Skin: Skin is warm and dry  No rash noted  She is not diaphoretic  No erythema  No pallor  Psychiatric: She has a normal mood and affect  Her behavior is normal  Judgment and thought content normal    Nursing note and vitals reviewed

## 2019-05-29 DIAGNOSIS — K21.00 GASTROESOPHAGEAL REFLUX DISEASE WITH ESOPHAGITIS: ICD-10-CM

## 2019-05-29 RX ORDER — OMEPRAZOLE 20 MG/1
20 CAPSULE, DELAYED RELEASE ORAL DAILY
Qty: 30 CAPSULE | Refills: 2 | Status: SHIPPED | OUTPATIENT
Start: 2019-05-29 | End: 2019-10-01 | Stop reason: SDUPTHER

## 2019-06-25 ENCOUNTER — OFFICE VISIT (OUTPATIENT)
Dept: OBGYN CLINIC | Facility: CLINIC | Age: 54
End: 2019-06-25
Payer: COMMERCIAL

## 2019-06-25 VITALS
WEIGHT: 203 LBS | SYSTOLIC BLOOD PRESSURE: 128 MMHG | BODY MASS INDEX: 42.61 KG/M2 | DIASTOLIC BLOOD PRESSURE: 82 MMHG | HEIGHT: 58 IN

## 2019-06-25 DIAGNOSIS — R10.2 PELVIC PAIN: ICD-10-CM

## 2019-06-25 DIAGNOSIS — Z01.419 WELL WOMAN EXAM: Primary | ICD-10-CM

## 2019-06-25 DIAGNOSIS — E28.39 HYPOESTROGENISM: ICD-10-CM

## 2019-06-25 DIAGNOSIS — Z13.220 SCREENING CHOLESTEROL LEVEL: ICD-10-CM

## 2019-06-25 DIAGNOSIS — Z12.39 BREAST CANCER SCREENING: ICD-10-CM

## 2019-06-25 PROCEDURE — 99386 PREV VISIT NEW AGE 40-64: CPT | Performed by: PHYSICIAN ASSISTANT

## 2019-06-25 RX ORDER — OMEPRAZOLE 20 MG/1
20 CAPSULE, DELAYED RELEASE ORAL DAILY
COMMUNITY
Start: 2019-02-04 | End: 2019-07-04

## 2019-06-25 RX ORDER — DEXTRAN 70, GLYCERIN, HYPROMELLOSE 1; 2; 3 MG/ML; MG/ML; MG/ML
2 SOLUTION/ DROPS OPHTHALMIC 4 TIMES DAILY
COMMUNITY
Start: 2019-04-23 | End: 2020-07-14

## 2019-06-25 RX ORDER — DEXTRAN 70, HYPROMELLOSE 2910 3; 1 MG/ML; MG/ML
SOLUTION/ DROPS OPHTHALMIC
Refills: 0 | COMMUNITY
Start: 2019-05-30 | End: 2021-01-28 | Stop reason: HOSPADM

## 2019-06-25 RX ORDER — LORATADINE 10 MG/1
10 TABLET ORAL DAILY
COMMUNITY
End: 2020-01-14

## 2019-06-25 RX ORDER — B-COMPLEX WITH VITAMIN C
TABLET ORAL
COMMUNITY
End: 2020-01-14

## 2019-06-25 RX ORDER — AMPICILLIN TRIHYDRATE 250 MG
1000 CAPSULE ORAL DAILY
COMMUNITY
End: 2020-07-14

## 2019-06-27 LAB — BACTERIA UR CULT: ABNORMAL

## 2019-07-01 ENCOUNTER — TELEPHONE (OUTPATIENT)
Dept: OBGYN CLINIC | Facility: CLINIC | Age: 54
End: 2019-07-01

## 2019-07-01 DIAGNOSIS — N39.0 BACTERIAL URINARY INFECTION: Primary | ICD-10-CM

## 2019-07-01 DIAGNOSIS — A49.9 BACTERIAL URINARY INFECTION: Primary | ICD-10-CM

## 2019-07-01 RX ORDER — NITROFURANTOIN 25; 75 MG/1; MG/1
100 CAPSULE ORAL 2 TIMES DAILY
Qty: 10 CAPSULE | Refills: 0 | Status: SHIPPED | OUTPATIENT
Start: 2019-07-01 | End: 2019-07-06

## 2019-07-03 DIAGNOSIS — R06.2 WHEEZING: ICD-10-CM

## 2019-07-03 LAB
HPV HR 12 DNA CVX QL NAA+PROBE: NOT DETECTED
HPV16 DNA SPEC QL NAA+PROBE: NOT DETECTED
HPV18 DNA SPEC QL NAA+PROBE: NOT DETECTED
THIN PREP CVX: NORMAL

## 2019-07-03 RX ORDER — FLUTICASONE FUROATE AND VILANTEROL 100; 25 UG/1; UG/1
1 POWDER RESPIRATORY (INHALATION) DAILY
Qty: 1 INHALER | Refills: 3 | Status: SHIPPED | OUTPATIENT
Start: 2019-07-03 | End: 2019-07-31 | Stop reason: CLARIF

## 2019-07-04 ENCOUNTER — APPOINTMENT (EMERGENCY)
Dept: RADIOLOGY | Facility: HOSPITAL | Age: 54
End: 2019-07-04
Payer: COMMERCIAL

## 2019-07-04 ENCOUNTER — HOSPITAL ENCOUNTER (EMERGENCY)
Facility: HOSPITAL | Age: 54
Discharge: HOME | End: 2019-07-05
Attending: EMERGENCY MEDICINE | Admitting: EMERGENCY MEDICINE
Payer: COMMERCIAL

## 2019-07-04 ENCOUNTER — APPOINTMENT (EMERGENCY)
Dept: CT IMAGING | Facility: HOSPITAL | Age: 54
End: 2019-07-04
Payer: COMMERCIAL
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
## 2019-07-04 DIAGNOSIS — M25.531 RIGHT WRIST PAIN: ICD-10-CM

## 2019-07-04 DIAGNOSIS — S70.01XA CONTUSION OF RIGHT HIP, INITIAL ENCOUNTER: ICD-10-CM

## 2019-07-04 PROCEDURE — 73080 X-RAY EXAM OF ELBOW: CPT

## 2019-07-04 PROCEDURE — 99284 EMERGENCY DEPT VISIT MOD MDM: CPT | Performed by: EMERGENCY MEDICINE

## 2019-07-04 PROCEDURE — 73610 X-RAY EXAM OF ANKLE: CPT

## 2019-07-04 PROCEDURE — 73502 X-RAY EXAM HIP UNI 2-3 VIEWS: CPT

## 2019-07-04 PROCEDURE — 70450 CT HEAD/BRAIN W/O DYE: CPT

## 2019-07-04 PROCEDURE — 96372 THER/PROPH/DIAG INJ SC/IM: CPT

## 2019-07-04 PROCEDURE — 99284 EMERGENCY DEPT VISIT MOD MDM: CPT

## 2019-07-04 PROCEDURE — 73110 X-RAY EXAM OF WRIST: CPT

## 2019-07-04 PROCEDURE — 72125 CT NECK SPINE W/O DYE: CPT

## 2019-07-04 RX ORDER — KETOROLAC TROMETHAMINE 30 MG/ML
15 INJECTION, SOLUTION INTRAMUSCULAR; INTRAVENOUS ONCE
Status: COMPLETED | OUTPATIENT
Start: 2019-07-04 | End: 2019-07-04

## 2019-07-04 RX ADMIN — KETOROLAC TROMETHAMINE 15 MG: 30 INJECTION, SOLUTION INTRAMUSCULAR at 23:31

## 2019-07-05 VITALS
DIASTOLIC BLOOD PRESSURE: 54 MMHG | BODY MASS INDEX: 42.85 KG/M2 | HEART RATE: 78 BPM | TEMPERATURE: 98.5 F | RESPIRATION RATE: 16 BRPM | WEIGHT: 205.03 LBS | OXYGEN SATURATION: 97 % | SYSTOLIC BLOOD PRESSURE: 94 MMHG

## 2019-07-05 RX ORDER — NAPROXEN 500 MG/1
250 TABLET ORAL 2 TIMES DAILY WITH MEALS
Qty: 30 TABLET | Refills: 0 | Status: SHIPPED | OUTPATIENT
Start: 2019-07-05 | End: 2020-07-14

## 2019-07-05 NOTE — DISCHARGE INSTRUCTIONS
Take the Naprosyn as needed for discomfort  You can try taking off the splint in 2-3 days, if your pain has not improved, put the splint back on  Call your family doctor, you should be seen in the office for further evaluation and management of your discomfort after your fall

## 2019-07-05 NOTE — ED PROVIDER NOTES
History  Chief Complaint   Patient presents with   Clarita Jamey Fall     pt reports trip and fall down 3 steps about 45 minutes pta  pt denies LOC or thinners  states she did strike her head  pt c/o head pain, neck pain, right elbow pain, right wrist pain, right hip/buttocks pain, left foot pain and "probably left arm" pain  denies n/v, dizziness  61 YO female presents after a mechanical fall  Pt states she was walking down the stairs in her home, notes there was clutter on the stairs and she lost her balance and fell trying to maneuver  She notes falling backwards and striking her head, subsequently falling down 3 stairs  Pt also landed on her Right side  She denies LOC after the event, had difficulty getting up immediately after falling but has been ambulating since the event  She complains of head and neck pain, also pain in the Right elbow, Right wrist, Right hip and Right ankle  Pt denies lightheadedness, chest pain or shortness of breath prior to falling  Pt is not taking anticoagulation  Pt denies CP/SOB/F/C/N/V/D/C, no dysuria, burning on urination or blood in urine          History provided by:  Patient and relative   used: No    Fall   Mechanism of injury: fall    Injury location:  Head/neck, leg and shoulder/arm  Head/neck injury location:  Head  Shoulder/arm injury location:  R arm  Leg injury location:  R hip and R ankle  Incident location:  Home  Arrived directly from scene: yes    Fall:     Fall occurred:  Down stairs  Suspicion of alcohol use: no    Suspicion of drug use: no    Tetanus status:  Up to date  Prior to arrival data:     Patient ambulatory at scene: yes      Blood loss:  None    Responsiveness at scene:  Alert    Orientation at scene:  Person, place, situation and time    Loss of consciousness: no    Associated symptoms: neck pain    Associated symptoms: no abdominal pain, no chest pain and no vomiting        Prior to Admission Medications   Prescriptions Last Dose Informant Patient Reported? Taking? Artificial Tear Solution (GENTEAL TEARS) 0 1-0 2-0 3 % SOLN   Yes No   Sig: Apply 2 drops to eye 4 (four) times a day   B Complex Vitamins (B COMPLEX 1 PO)   Yes No   Sig: Take 1 capsule by mouth daily   Cinnamon 500 MG capsule   Yes No   Sig: Take 1,000 mg by mouth daily   GENTEAL TEARS PF 0 1-0 3 % SOLN   Yes No   Garlic 5008 MG CAPS   Yes No   Sig: Take by mouth   Lecithin 1000 MG CHEW   Yes No   Sig: Chew 2 (two) times a day   Zinc 100 MG TABS   Yes No   Sig: Take by mouth   fluticasone-vilanterol (BREO ELLIPTA) 100-25 mcg/inh inhaler   No No   Sig: Inhale 1 puff daily Rinse mouth after use    loratadine (CLARITIN) 10 mg tablet   Yes No   Sig: Take 10 mg by mouth daily   nitrofurantoin (MACROBID) 100 mg capsule   No No   Sig: Take 1 capsule (100 mg total) by mouth 2 (two) times a day for 5 days   omeprazole (PriLOSEC) 20 mg delayed release capsule   No No   Sig: Take 1 capsule (20 mg total) by mouth daily   topiramate (TOPAMAX) 100 mg tablet  Self Yes No   Sig: Take 100 mg by mouth 2 (two) times a day        Facility-Administered Medications: None       Past Medical History:   Diagnosis Date    Adhesive capsulitis of shoulder     LEFT    Anemia     Anesthesia complication     difficulty awakening    Milk intolerance     Pneumonia     Seizure disorder ([address] )     last seizure     [facility]ures [facility] [address]     Sexually transmitted disease     Urinary incontinence     Wears dentures     full upper and partial lower - doesnt wear lower    Wears glasses     reading glasses       Past Surgical History:   Procedure Laterality Date    BLADDER SUSPENSION      CHOLECYSTECTOMY      Laparoscopic    INDUCED       [facility] SHLDR Babak Silence Left 2016    Procedure: ARTHROSCOPY SHOULDER ,LYSIS OF ADHESIONS MANIPULATION UNDER ANESTHESIA; INJECTION OF LEFT SHOULDER;  Surgeon: Fareed Starks MD;  Location: [facility];  Service: [address] [facility]  Family History   Problem Relation Age of Onset    Cancer Mother         carcinoma in Situ    Diabetes Mother     Hypertension Mother     Stroke Mother         syndrome    Brain cancer Family      I have reviewed and agree with the history as documented  Social History     Tobacco Use    Smoking status: Former Smoker     Last attempt to quit: 1991     Years since quittin 3    Smokeless tobacco: Never Used    Tobacco comment: Never a smoker, per [employer]   Substance Use Topics    Alcohol use: Yes     Comment: 1-2 x month, No alcohol use, per [employer]    Drug use: No        Review of Systems   Constitutional: Negative for chills, fatigue and fever  HENT: Negative for dental problem  Eyes: Negative for visual disturbance  Respiratory: Negative for shortness of breath  Cardiovascular: Negative for chest pain  Gastrointestinal: Negative for abdominal pain, diarrhea and vomiting  Genitourinary: Negative for dysuria and frequency  Musculoskeletal: Positive for neck pain  Negative for arthralgias  Skin: Negative for rash  Neurological: Negative for dizziness, weakness and light-headedness  Psychiatric/Behavioral: Negative for agitation, behavioral problems and confusion  All other systems reviewed and are negative  Physical Exam  Physical Exam   Constitutional: She is oriented to person, place, and time  She appears well-developed and well-nourished  HENT:   Head: Normocephalic and atraumatic  Eyes: Pupils are equal, round, and reactive to light  EOM are normal    Neck: Normal range of motion  Neck supple  Cardiovascular: Normal rate, regular rhythm and normal heart sounds  Pulmonary/Chest: Effort normal and breath sounds normal    Abdominal: Soft  Musculoskeletal: Normal range of motion  FROM in all extremities, no obvious deformities, tender over Right elbow where small abrasion is noted, Tender over Right wrist, Right hip and Right ankle  Neurological: She is alert and oriented to person, place, and time  Skin: Skin is warm and dry  Psychiatric: She has a normal mood and affect  Her behavior is normal  Thought content normal    Nursing note and vitals reviewed  Vital Signs  ED Triage Vitals   Temperature Pulse Respirations Blood Pressure SpO2   07/04/19 2300 07/04/19 2300 07/04/19 2300 07/04/19 2300 07/04/19 2300   98 5 °F (36 9 °C) 96 16 130/74 95 %      Temp Source Heart Rate Source Patient Position - Orthostatic VS BP Location FiO2 (%)   07/04/19 2300 07/05/19 0109 07/05/19 0109 07/05/19 0109 --   Temporal Monitor Lying Right arm       Pain Score       07/04/19 2300       8           Vitals:    07/04/19 2300 07/05/19 0109   BP: 130/74 94/54   Pulse: 96 78   Patient Position - Orthostatic VS:  Lying         Visual Acuity      ED Medications  Medications   ketorolac (TORADOL) injection 15 mg (15 mg Intramuscular Given 7/4/19 2331)       Diagnostic Studies  Results Reviewed     None                 XR elbow 3+ views RIGHT   ED Interpretation by Catherine Guillory MD (07/05 0038)   No fracture or dislocation      XR wrist 3+ views RIGHT   ED Interpretation by Catherine Guillory MD (07/05 0040)   No Fx      XR hip/pelv 2-3 vws right   ED Interpretation by Catherine Guillory MD (07/05 0040)   No Fx      XR ankle 3+ views RIGHT   ED Interpretation by Catherine Guillory MD (07/05 0040)   No Fx      CT head without contrast   Final Result by Susan Jo DO (07/05 0030)   No acute intracranial abnormality  Workstation performed: [id number]         CT cervical spine without contrast   Final Result by Susan Jo DO (07/05 2316)   No cervical spine fracture or traumatic malalignment                     Workstation performed: [id number]                    Procedures  Procedures       ED Course          Splint application:  Thumb Spica Splint was applied to Right wrist, Applied by technician, good position, neurovascular tendon intact, good capillary refill  Evaluated by me prior to discharge  MDM  Number of Diagnoses or Management Options  Contusion of right hip, initial encounter: new and requires workup  Fall, initial encounter: new and requires workup  Right wrist pain: new and requires workup  Diagnosis management comments: 1  Mechanical fall - Will CT head and neck, x-ray Right elbow, wrist, hip and ankle  Amount and/or Complexity of Data Reviewed  Tests in the radiology section of CPT®: ordered and reviewed  Obtain history from someone other than the patient: yes  Independent visualization of images, tracings, or specimens: yes    Patient Progress  Patient progress: stable      Disposition  Final diagnoses:   Fall, initial encounter   Right wrist pain   Contusion of right hip, initial encounter     Time reflects when diagnosis was documented in both MDM as applicable and the Disposition within this note     Time User Action Codes Description Comment    7/5/2019 12:53 AM Erjohnson Beba E Add [Z12  IDIR] Fall, initial encounter     7/5/2019 12:53 AM Erle Beba E Add [M25 531] Right wrist pain     7/5/2019 12:53 AM Erjohnson Oros E Add [S70 01XA] Contusion of right hip, initial encounter       ED Disposition     ED Disposition Condition Date/Time Comment    Discharge Stable Fri Jul 5, 2019 12:53 AM Lori Ornelas discharge to home/self care  Follow-up Information     Follow up With Specialties Details Why Contact Info    Ruth Mary DO Family Medicine   [address] [place] [address] Road  [phone]            Patient's Medications   Discharge Prescriptions    NAPROXEN (NAPROSYN) 500 MG TABLET    Take 0 5 tablets (250 mg total) by mouth 2 (two) times a day with meals       Start Date: 7/5/2019  End Date: --       Order Dose: 250 mg       Quantity: 30 tablet    Refills: 0     No discharge procedures on file      ED Provider  Electronically Signed by           Alex Man MD  07/05/19 0648

## 2019-07-30 ENCOUNTER — TELEPHONE (OUTPATIENT)
Dept: FAMILY MEDICINE CLINIC | Facility: CLINIC | Age: 54
End: 2019-07-30

## 2019-07-30 NOTE — TELEPHONE ENCOUNTER
PT CALLED - INSURANCE SENT OVER PAPER WORK REGARDING PATIENTS MEDICATION FOR (BRIO) - SHE WOULD LIKE A PHONE CALL STATING THE STATUS OF THE MEDICATION    [facility]

## 2019-07-31 ENCOUNTER — TELEPHONE (OUTPATIENT)
Dept: FAMILY MEDICINE CLINIC | Facility: CLINIC | Age: 54
End: 2019-07-31

## 2019-07-31 DIAGNOSIS — R06.2 WHEEZING: Primary | ICD-10-CM

## 2019-07-31 NOTE — TELEPHONE ENCOUNTER
Dr Cathryne Mohs patient:     Pt's insurance denied the Janet Lites for [employer]  The patients insurance requires a trial/failure of 3 alternatives: Spiriva Respimat, Anoro Ellipta, Cimbivent Respimat, Wixela, Atrovent HFA, Stiolto Respimat, Armonair Respiclick  Please change medication

## 2019-08-26 DIAGNOSIS — J40 BRONCHITIS: Primary | ICD-10-CM

## 2019-08-26 DIAGNOSIS — J01.00 ACUTE NON-RECURRENT MAXILLARY SINUSITIS: ICD-10-CM

## 2019-08-26 RX ORDER — FLUTICASONE FUROATE AND VILANTEROL 100; 25 UG/1; UG/1
1 POWDER RESPIRATORY (INHALATION) DAILY
COMMUNITY
Start: 2019-04-18 | End: 2019-08-26 | Stop reason: SDUPTHER

## 2019-08-26 RX ORDER — FLUTICASONE FUROATE AND VILANTEROL 100; 25 UG/1; UG/1
1 POWDER RESPIRATORY (INHALATION) DAILY
Qty: 1 INHALER | Refills: 0 | Status: SHIPPED | OUTPATIENT
Start: 2019-08-26 | End: 2019-08-29 | Stop reason: CLARIF

## 2019-08-29 ENCOUNTER — TELEPHONE (OUTPATIENT)
Dept: FAMILY MEDICINE CLINIC | Facility: CLINIC | Age: 54
End: 2019-08-29

## 2019-08-29 NOTE — TELEPHONE ENCOUNTER
We received a notification an Holly Modi is required for [facility]  I contacted the pharmacy and left a message stating the [facility] was dc'd and Lutricia Kipper was prescribed to replace it on 7/31/19  The insurance denied the [facility] and Lutricia Kipper is a formulary alternative  Asked for call back if there are questions or problems

## 2019-09-06 ENCOUNTER — HOSPITAL ENCOUNTER (OUTPATIENT)
Dept: ULTRASOUND IMAGING | Facility: MEDICAL CENTER | Age: 54
Discharge: HOME | End: 2019-09-06
Payer: COMMERCIAL

## 2019-09-06 ENCOUNTER — HOSPITAL ENCOUNTER (OUTPATIENT)
Dept: BONE DENSITY | Facility: MEDICAL CENTER | Age: 54
Discharge: HOME | End: 2019-09-06
Payer: COMMERCIAL

## 2019-09-06 DIAGNOSIS — R10.2 PELVIC PAIN: ICD-10-CM

## 2019-09-06 DIAGNOSIS — E28.39 HYPOESTROGENISM: ICD-10-CM

## 2019-09-06 PROCEDURE — 77080 DXA BONE DENSITY AXIAL: CPT

## 2019-09-06 PROCEDURE — 76830 TRANSVAGINAL US NON-OB: CPT

## 2019-09-06 PROCEDURE — 76856 US EXAM PELVIC COMPLETE: CPT

## 2019-10-01 DIAGNOSIS — K21.00 GASTROESOPHAGEAL REFLUX DISEASE WITH ESOPHAGITIS: ICD-10-CM

## 2019-10-01 LAB
ALBUMIN SERPL-MCNC: 4.2 G/DL (ref 3.6–5.1)
ALBUMIN/GLOB SERPL: 1.6 (CALC) (ref 1–2.5)
ALP SERPL-CCNC: 83 U/L (ref 33–130)
ALT SERPL-CCNC: 28 U/L (ref 6–29)
AST SERPL-CCNC: 19 U/L (ref 10–35)
BASOPHILS # BLD AUTO: 48 CELLS/UL (ref 0–200)
BASOPHILS NFR BLD AUTO: 0.8 %
BILIRUB SERPL-MCNC: 0.3 MG/DL (ref 0.2–1.2)
BUN SERPL-MCNC: 21 MG/DL (ref 7–25)
BUN/CREAT SERPL: 16 (CALC) (ref 6–22)
CALCIUM SERPL-MCNC: 9.4 MG/DL (ref 8.6–10.4)
CHLORIDE SERPL-SCNC: 106 MMOL/L (ref 98–110)
CHOLEST SERPL-MCNC: 209 MG/DL
CHOLEST/HDLC SERPL: 4.5 (CALC)
CO2 SERPL-SCNC: 27 MMOL/L (ref 20–32)
CREAT SERPL-MCNC: 1.33 MG/DL (ref 0.5–1.05)
EOSINOPHIL # BLD AUTO: 330 CELLS/UL (ref 15–500)
EOSINOPHIL NFR BLD AUTO: 5.5 %
ERYTHROCYTE [DISTWIDTH] IN BLOOD BY AUTOMATED COUNT: 13.3 % (ref 11–15)
GLOBULIN SER CALC-MCNC: 2.7 G/DL (CALC) (ref 1.9–3.7)
GLUCOSE SERPL-MCNC: 145 MG/DL (ref 65–99)
HCT VFR BLD AUTO: 42.1 % (ref 35–45)
HDLC SERPL-MCNC: 46 MG/DL
HGB BLD-MCNC: 13.4 G/DL (ref 11.7–15.5)
LDLC SERPL CALC-MCNC: 131 MG/DL (CALC)
LYMPHOCYTES # BLD AUTO: 1602 CELLS/UL (ref 850–3900)
LYMPHOCYTES NFR BLD AUTO: 26.7 %
MCH RBC QN AUTO: 27.9 PG (ref 27–33)
MCHC RBC AUTO-ENTMCNC: 31.8 G/DL (ref 32–36)
MCV RBC AUTO: 87.5 FL (ref 80–100)
MONOCYTES # BLD AUTO: 498 CELLS/UL (ref 200–950)
MONOCYTES NFR BLD AUTO: 8.3 %
NEUTROPHILS # BLD AUTO: 3522 CELLS/UL (ref 1500–7800)
NEUTROPHILS NFR BLD AUTO: 58.7 %
NONHDLC SERPL-MCNC: 163 MG/DL (CALC)
PLATELET # BLD AUTO: 232 THOUSAND/UL (ref 140–400)
PMV BLD REES-ECKER: 10 FL (ref 7.5–12.5)
POTASSIUM SERPL-SCNC: 4.6 MMOL/L (ref 3.5–5.3)
PROT SERPL-MCNC: 6.9 G/DL (ref 6.1–8.1)
RBC # BLD AUTO: 4.81 MILLION/UL (ref 3.8–5.1)
SL AMB EGFR AFRICAN AMERICAN: 52 ML/MIN/1.73M2
SL AMB EGFR NON AFRICAN AMERICAN: 45 ML/MIN/1.73M2
SODIUM SERPL-SCNC: 140 MMOL/L (ref 135–146)
T4 FREE SERPL-MCNC: 1 NG/DL (ref 0.8–1.8)
TRIGL SERPL-MCNC: 188 MG/DL
TSH SERPL-ACNC: 3.12 MIU/L (ref 0.4–4.5)
WBC # BLD AUTO: 6 THOUSAND/UL (ref 3.8–10.8)

## 2019-10-01 RX ORDER — OMEPRAZOLE 20 MG/1
20 CAPSULE, DELAYED RELEASE ORAL DAILY
Qty: 30 CAPSULE | Refills: 2 | Status: SHIPPED | OUTPATIENT
Start: 2019-10-01 | End: 2019-10-01 | Stop reason: SDUPTHER

## 2019-10-01 RX ORDER — OMEPRAZOLE 20 MG/1
20 CAPSULE, DELAYED RELEASE ORAL DAILY
Qty: 30 CAPSULE | Refills: 2 | Status: SHIPPED | OUTPATIENT
Start: 2019-10-01 | End: 2020-01-14

## 2019-10-02 ENCOUNTER — TELEPHONE (OUTPATIENT)
Dept: OBGYN CLINIC | Facility: CLINIC | Age: 54
End: 2019-10-02

## 2019-10-02 NOTE — TELEPHONE ENCOUNTER
Pt returned call, reviewed test results, she is aware to schedule follow up appt with Dr Thuy Rice PCP

## 2019-10-07 NOTE — PROGRESS NOTES
Endometrial biopsy  Date/Time: 10/7/2019 3:05 PM  Performed by: Yuni Willett PA-C  Authorized by: Yuni Willett PA-C     Consent:     Consent obtained:  Verbal and written    Consent given by:  Patient    Procedural risks discussed:  Bleeding, damage to other organs, death, failure rate, infection, possible continued pain, possible conversion to open surgery, possible loss of function and repeat procedure    Patient questions answered: yes      Patient agrees, verbalizes understanding, and wants to proceed: yes      Instructions and paperwork completed: yes    Indication:     Indications: Other disorder of menstruation and other abnormal bleeding from female genital tract      Indications comment: Thickened EL in  state seen on US  Pre-procedure:     Premeds:  Ibuprofen  Procedure:     Procedure: endometrial biopsy with Pipelle      A bivalve speculum was placed in the vagina: yes      Cervix cleaned and prepped: yes      A paracervical block was performed: no      An intracervical block was performed: no      The cervix was dilated: no      Uterus sounded: yes      Uterus sound depth (cm):  6    Specimen collected: specimen collected and sent to pathology    Comments:      Pt had a lot of cramping with procedure but tolerated it through the end  Aware nothing PV x 24-48 hours  Aware to use NSAIDs as needed q 6 hours tonight  Aware will f/u as needed based on results

## 2019-10-08 ENCOUNTER — HOSPITAL ENCOUNTER (OUTPATIENT)
Dept: MAMMOGRAPHY | Facility: MEDICAL CENTER | Age: 54
Discharge: HOME | End: 2019-10-08
Payer: COMMERCIAL

## 2019-10-08 ENCOUNTER — PROCEDURE VISIT (OUTPATIENT)
Dept: OBGYN CLINIC | Facility: CLINIC | Age: 54
End: 2019-10-08
Payer: COMMERCIAL

## 2019-10-08 VITALS
SYSTOLIC BLOOD PRESSURE: 108 MMHG | WEIGHT: 208.2 LBS | HEIGHT: 58 IN | BODY MASS INDEX: 43.7 KG/M2 | DIASTOLIC BLOOD PRESSURE: 72 MMHG

## 2019-10-08 VITALS — HEIGHT: 58 IN | BODY MASS INDEX: 42.85 KG/M2

## 2019-10-08 DIAGNOSIS — R93.89 THICKENED ENDOMETRIUM: Primary | ICD-10-CM

## 2019-10-08 DIAGNOSIS — Z12.31 SCREENING MAMMOGRAM, ENCOUNTER FOR: ICD-10-CM

## 2019-10-08 PROCEDURE — 77067 SCR MAMMO BI INCL CAD: CPT

## 2019-10-08 PROCEDURE — 88305 TISSUE EXAM BY PATHOLOGIST: CPT | Performed by: PATHOLOGY

## 2019-10-08 PROCEDURE — 77063 BREAST TOMOSYNTHESIS BI: CPT

## 2019-10-08 PROCEDURE — 58100 BIOPSY OF UTERUS LINING: CPT | Performed by: PHYSICIAN ASSISTANT

## 2019-10-14 DIAGNOSIS — R93.89 THICKENED ENDOMETRIUM: Primary | ICD-10-CM

## 2019-10-15 ENCOUNTER — TRANSCRIBE ORDERS (OUTPATIENT)
Dept: MAMMOGRAPHY | Facility: CLINIC | Age: 54
End: 2019-10-15

## 2019-10-15 ENCOUNTER — HOSPITAL ENCOUNTER (OUTPATIENT)
Dept: MAMMOGRAPHY | Facility: CLINIC | Age: 54
Discharge: HOME | End: 2019-10-15
Payer: COMMERCIAL

## 2019-10-15 ENCOUNTER — HOSPITAL ENCOUNTER (OUTPATIENT)
Dept: ULTRASOUND IMAGING | Facility: CLINIC | Age: 54
Discharge: HOME | End: 2019-10-15
Payer: COMMERCIAL

## 2019-10-15 VITALS — BODY MASS INDEX: 43.66 KG/M2 | HEIGHT: 58 IN | WEIGHT: 208 LBS

## 2019-10-15 DIAGNOSIS — R92.8 ABNORMAL SCREENING MAMMOGRAM: ICD-10-CM

## 2019-10-15 DIAGNOSIS — R92.8 ABNORMAL FINDINGS ON DIAGNOSTIC IMAGING OF BREAST: Primary | ICD-10-CM

## 2019-10-15 PROCEDURE — G0279 TOMOSYNTHESIS, MAMMO: HCPCS

## 2019-10-15 PROCEDURE — 76642 ULTRASOUND BREAST LIMITED: CPT

## 2019-10-15 PROCEDURE — 77066 DX MAMMO INCL CAD BI: CPT

## 2019-11-04 DIAGNOSIS — R06.2 WHEEZING: ICD-10-CM

## 2020-01-07 ENCOUNTER — OPTICAL OFFICE (OUTPATIENT)
Dept: URBAN - METROPOLITAN AREA CLINIC 143 | Facility: CLINIC | Age: 55
Setting detail: OPHTHALMOLOGY
End: 2020-01-07
Payer: COMMERCIAL

## 2020-01-07 ENCOUNTER — DOCTOR'S OFFICE (OUTPATIENT)
Dept: URBAN - METROPOLITAN AREA CLINIC 136 | Facility: CLINIC | Age: 55
Setting detail: OPHTHALMOLOGY
End: 2020-01-07
Payer: COMMERCIAL

## 2020-01-07 VITALS — HEIGHT: 55 IN

## 2020-01-07 DIAGNOSIS — H52.4: ICD-10-CM

## 2020-01-07 DIAGNOSIS — H52.03: ICD-10-CM

## 2020-01-07 PROCEDURE — V2744 TINT PHOTOCHROMATIC LENS/ES: HCPCS | Performed by: OPTOMETRIST

## 2020-01-07 PROCEDURE — 92014 COMPRE OPH EXAM EST PT 1/>: CPT | Performed by: OPTOMETRIST

## 2020-01-07 PROCEDURE — V2020 VISION SVCS FRAMES PURCHASES: HCPCS | Performed by: OPTOMETRIST

## 2020-01-07 PROCEDURE — V2203 LENS SPHCYL BIFOCAL 4.00D/.1: HCPCS | Performed by: OPTOMETRIST

## 2020-01-07 PROCEDURE — V2781 PROGRESSIVE LENS PER LENS: HCPCS | Performed by: OPTOMETRIST

## 2020-01-07 PROCEDURE — V2200 LENS SPHER BIFOC PLANO 4.00D: HCPCS | Performed by: OPTOMETRIST

## 2020-01-07 PROCEDURE — 92015 DETERMINE REFRACTIVE STATE: CPT | Performed by: OPTOMETRIST

## 2020-01-07 ASSESSMENT — SPHEQUIV_DERIVED
OS_SPHEQUIV: 0
OD_SPHEQUIV: 0.5
OD_SPHEQUIV: 0.125

## 2020-01-07 ASSESSMENT — REFRACTION_MANIFEST
OS_VA3: 20/
OS_VA2: 20/20(J1+)
OS_CYLINDER: SPH
OD_SPHERE: +0.75
OS_ADD: +2.50
OS_SPHERE: +0.75
OD_AXIS: 020
OD_VA3: 20/
OD_ADD: +2.50
OU_VA: 20/20
OD_VA2: 20/20(J1+)
OS_VA1: 20/20
OD_CYLINDER: -0.50
OD_VA1: 20/20-2

## 2020-01-07 ASSESSMENT — REFRACTION_CURRENTRX
OS_CYLINDER: SPHERE
OD_OVR_VA: 20/
OS_OVR_VA: 20/
OS_VPRISM_DIRECTION: BF
OD_CYLINDER: SPHERE
OS_SPHERE: +0.75
OD_SPHERE: +0.75
OD_ADD: +2.50
OD_VPRISM_DIRECTION: BF
OS_ADD: +2.50

## 2020-01-07 ASSESSMENT — AXIALLENGTH_DERIVED
OS_AL: 22.4141
OD_AL: 22.3703
OD_AL: 22.2397

## 2020-01-07 ASSESSMENT — TEAR BREAK UP TIME (TBUT)
OS_TBUT: 2+
OD_TBUT: 2+

## 2020-01-07 ASSESSMENT — DECREASING TEAR LAKE - SEVERITY SCORE
OS_DEC_TEARLAKE: 2+
OD_DEC_TEARLAKE: 2+

## 2020-01-07 ASSESSMENT — REFRACTION_AUTOREFRACTION
OS_SPHERE: +0.75
OD_CYLINDER: -1.25
OD_SPHERE: +0.75
OS_CYLINDER: -1.50
OS_AXIS: 169
OD_AXIS: 20

## 2020-01-07 ASSESSMENT — VISUAL ACUITY
OS_BCVA: 20/30-1
OD_BCVA: 20/20-2

## 2020-01-07 ASSESSMENT — KERATOMETRY
OS_K1POWER_DIOPTERS: 46.75
OS_AXISANGLE_DEGREES: 158
OS_K2POWER_DIOPTERS: 47.00
OD_K2POWER_DIOPTERS: 47.25
OD_K1POWER_DIOPTERS: 46.50
OD_AXISANGLE_DEGREES: 105

## 2020-01-07 ASSESSMENT — CONFRONTATIONAL VISUAL FIELD TEST (CVF)
OS_FINDINGS: FULL
OD_FINDINGS: FULL

## 2020-01-07 ASSESSMENT — SUPERFICIAL PUNCTATE KERATITIS (SPK)
OS_SPK: 1+
OD_SPK: 2+

## 2020-01-14 ENCOUNTER — OFFICE VISIT (OUTPATIENT)
Dept: FAMILY MEDICINE CLINIC | Facility: CLINIC | Age: 55
End: 2020-01-14
Payer: COMMERCIAL

## 2020-01-14 VITALS
HEIGHT: 58 IN | WEIGHT: 206 LBS | BODY MASS INDEX: 43.24 KG/M2 | TEMPERATURE: 97.2 F | SYSTOLIC BLOOD PRESSURE: 92 MMHG | DIASTOLIC BLOOD PRESSURE: 66 MMHG

## 2020-01-14 DIAGNOSIS — E53.8 VITAMIN B12 DEFICIENCY: Primary | ICD-10-CM

## 2020-01-14 DIAGNOSIS — R41.3 MEMORY LOSS: ICD-10-CM

## 2020-01-14 PROCEDURE — 99213 OFFICE O/P EST LOW 20 MIN: CPT | Performed by: FAMILY MEDICINE

## 2020-01-14 PROCEDURE — 3008F BODY MASS INDEX DOCD: CPT | Performed by: FAMILY MEDICINE

## 2020-01-14 PROCEDURE — 3725F SCREEN DEPRESSION PERFORMED: CPT | Performed by: FAMILY MEDICINE

## 2020-01-14 NOTE — PROGRESS NOTES
Assessment/Plan: To consider sleep study  Diagnoses and all orders for this visit:    Vitamin B12 deficiency  -     CBC and differential; Future  -     Comprehensive metabolic panel; Future  -     Methylmalonic acid, serum; Future  -     C-reactive protein; Future  -     UA w Reflex to Microscopic w Reflex to Culture -Lab Collect  -     Vitamin B12; Future  -     Ferritin; Future  -     TSH, 3rd generation with Free T4 reflex; Future    Memory loss  -     CBC and differential; Future  -     Comprehensive metabolic panel; Future  -     Methylmalonic acid, serum; Future  -     C-reactive protein; Future  -     UA w Reflex to Microscopic w Reflex to Culture -Lab Collect  -     Vitamin B12; Future  -     Ferritin; Future  -     TSH, 3rd generation with Free T4 reflex; Future    Other orders  -     Cyanocobalamin (VITAMIN B-12 PO); Take by mouth            Subjective:        Patient ID: Rodrigo Joyner is a 62 y o  female  Patient is here with fatigue and possible B12 deficiency  Patient does take B12 daily  Patient also takes vitamin-E  Patient also with some memory loss  Patient also with daytime sleepiness and nearly falling asleep at times throughout the day          The following portions of the patient's history were reviewed and updated as appropriate: allergies, current medications, past family history, past medical history, past social history, past surgical history and problem list       Review of Systems        Objective:               BP 92/66 (BP Location: Right arm, Patient Position: Sitting, Cuff Size: Large)   Temp (!) 97 2 °F (36 2 °C) (Tympanic)   Ht 4' 10" (1 473 m)   Wt 93 4 kg (206 lb)   LMP  (LMP Unknown)   BMI 43 05 kg/m²          Physical Exam

## 2020-01-15 DIAGNOSIS — K21.00 GASTROESOPHAGEAL REFLUX DISEASE WITH ESOPHAGITIS: ICD-10-CM

## 2020-01-15 RX ORDER — OMEPRAZOLE 20 MG/1
CAPSULE, DELAYED RELEASE ORAL
Qty: 30 CAPSULE | Refills: 0 | Status: SHIPPED | OUTPATIENT
Start: 2020-01-15 | End: 2020-04-17

## 2020-02-07 ENCOUNTER — OFFICE VISIT (OUTPATIENT)
Dept: FAMILY MEDICINE CLINIC | Facility: CLINIC | Age: 55
End: 2020-02-07
Payer: COMMERCIAL

## 2020-02-07 VITALS
HEART RATE: 78 BPM | WEIGHT: 202 LBS | BODY MASS INDEX: 42.4 KG/M2 | TEMPERATURE: 97.7 F | HEIGHT: 58 IN | OXYGEN SATURATION: 95 % | DIASTOLIC BLOOD PRESSURE: 70 MMHG | SYSTOLIC BLOOD PRESSURE: 110 MMHG

## 2020-02-07 DIAGNOSIS — R56.9 SEIZURE (HCC): ICD-10-CM

## 2020-02-07 DIAGNOSIS — A09 DIARRHEA OF INFECTIOUS ORIGIN: Primary | ICD-10-CM

## 2020-02-07 DIAGNOSIS — R11.0 NAUSEA: ICD-10-CM

## 2020-02-07 PROCEDURE — 1036F TOBACCO NON-USER: CPT | Performed by: FAMILY MEDICINE

## 2020-02-07 PROCEDURE — 3008F BODY MASS INDEX DOCD: CPT | Performed by: FAMILY MEDICINE

## 2020-02-07 PROCEDURE — 99214 OFFICE O/P EST MOD 30 MIN: CPT | Performed by: FAMILY MEDICINE

## 2020-02-07 RX ORDER — ONDANSETRON 4 MG/1
4 TABLET, ORALLY DISINTEGRATING ORAL EVERY 6 HOURS PRN
Qty: 30 TABLET | Refills: 0 | Status: SHIPPED | OUTPATIENT
Start: 2020-02-07 | End: 2020-07-14

## 2020-02-07 NOTE — PROGRESS NOTES
Assessment/Plan:    13-year-old woman with:  Seizures, diarrhea and nausea  Discussed workup and treatment options at length with risks and benefits  Continue current medications and will check stool studies  Encouraged using Zofran as needed and ample p o  Liquids with electrolytes and advised patient to advance her diet as tolerated if symptoms worsen or do not continue to resolve to call back immediately go to the emergency room  No problem-specific Assessment & Plan notes found for this encounter  Diagnoses and all orders for this visit:    Diarrhea of infectious origin  -     Clostridium difficile toxin by PCR; Future  -     Stool culture; Future  -     White Blood Cells, Stool by Gram Stain; Future  -     H  pylori antigen, stool; Future  -     Ova and parasite examination; Future  -     Calprotectin,Fecal; Future    Nausea  -     Clostridium difficile toxin by PCR; Future  -     Stool culture; Future  -     White Blood Cells, Stool by Gram Stain; Future  -     H  pylori antigen, stool; Future  -     Ova and parasite examination; Future  -     Calprotectin,Fecal; Future  -     ondansetron (ZOFRAN-ODT) 4 mg disintegrating tablet; Take 1 tablet (4 mg total) by mouth every 6 (six) hours as needed for nausea or vomiting    Seizure (HCC)          Subjective:     Chief Complaint   Patient presents with    Diarrhea      two  &half days     Fatigue      always     Nausea       feeling but can eat ,  then diarrhea        Patient ID: Sandy Plaza is a 62 y o  female  Patient is a 13-year-old woman with a history of seizures who presents complaining of several days of some nausea along with diarrhea and abdominal cramping  Some fever and chills  No vomiting  No bloody diarrhea  No constipation  Patient admits that she cares for patient's set up have come from the hospital she has not had recent antibiotics    She otherwise admits being stable on medications and denies other complaints at this time      The following portions of the patient's history were reviewed and updated as appropriate: allergies, current medications, past family history, past medical history, past social history, past surgical history and problem list     Review of Systems   Constitutional: Negative  HENT: Negative  Eyes: Negative  Respiratory: Negative  Cardiovascular: Negative  Gastrointestinal: Positive for abdominal pain, diarrhea and nausea  Endocrine: Negative  Genitourinary: Negative  Musculoskeletal: Negative  Allergic/Immunologic: Negative  Neurological: Negative  Hematological: Negative  Psychiatric/Behavioral: Negative  All other systems reviewed and are negative  Objective:      /70 (BP Location: Left arm, Patient Position: Sitting, Cuff Size: Large)   Pulse 78   Temp 97 7 °F (36 5 °C) (Tympanic)   Ht 4' 10" (1 473 m)   Wt 91 6 kg (202 lb)   LMP  (LMP Unknown)   SpO2 95%   BMI 42 22 kg/m²          Physical Exam   Constitutional: She is oriented to person, place, and time  She appears well-developed and well-nourished  HENT:   Head: Atraumatic  Right Ear: External ear normal    Left Ear: External ear normal    Eyes: Pupils are equal, round, and reactive to light  Conjunctivae and EOM are normal    Neck: Normal range of motion  Cardiovascular: Normal rate, regular rhythm and normal heart sounds  Pulmonary/Chest: Effort normal and breath sounds normal  No respiratory distress  Abdominal: Soft  She exhibits no distension  There is tenderness  There is no rebound and no guarding  Diffuse tenderness to palpation  No rebound or guarding  Musculoskeletal: Normal range of motion  Neurological: She is alert and oriented to person, place, and time  No cranial nerve deficit  Skin: Skin is warm and dry  Psychiatric: She has a normal mood and affect  Her behavior is normal  Judgment and thought content normal          BMI Counseling:  Body mass index is 42 22 kg/m²  The BMI is above normal  Nutrition recommendations include encouraging healthy choices of fruits and vegetables  Exercise recommendations include moderate physical activity 150 minutes/week

## 2020-02-10 ENCOUNTER — TELEPHONE (OUTPATIENT)
Dept: FAMILY MEDICINE CLINIC | Facility: CLINIC | Age: 55
End: 2020-02-10

## 2020-02-10 NOTE — TELEPHONE ENCOUNTER
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

## 2020-02-11 NOTE — TELEPHONE ENCOUNTER
Patient is still not feeling well, still has diarrhea and upset stomach  Patient will call back to let us know when she plans to return and about note needed

## 2020-02-14 LAB
ALBUMIN SERPL-MCNC: 4.3 G/DL (ref 3.6–5.1)
ALBUMIN/GLOB SERPL: 1.4 (CALC) (ref 1–2.5)
ALP SERPL-CCNC: 72 U/L (ref 37–153)
ALT SERPL-CCNC: 27 U/L (ref 6–29)
APPEARANCE UR: CLEAR
AST SERPL-CCNC: 18 U/L (ref 10–35)
BACTERIA UR CULT: NORMAL
BACTERIA UR QL AUTO: ABNORMAL /HPF
BASOPHILS # BLD AUTO: 48 CELLS/UL (ref 0–200)
BASOPHILS NFR BLD AUTO: 0.7 %
BILIRUB SERPL-MCNC: 0.2 MG/DL (ref 0.2–1.2)
BILIRUB UR QL STRIP: NEGATIVE
BUN SERPL-MCNC: 12 MG/DL (ref 7–25)
BUN/CREAT SERPL: NORMAL (CALC) (ref 6–22)
CALCIUM SERPL-MCNC: 9.3 MG/DL (ref 8.6–10.4)
CHLORIDE SERPL-SCNC: 107 MMOL/L (ref 98–110)
CO2 SERPL-SCNC: 29 MMOL/L (ref 20–32)
COLOR UR: YELLOW
CREAT SERPL-MCNC: 1.02 MG/DL (ref 0.5–1.05)
CRP SERPL-MCNC: 2.4 MG/L
EOSINOPHIL # BLD AUTO: 272 CELLS/UL (ref 15–500)
EOSINOPHIL NFR BLD AUTO: 4 %
ERYTHROCYTE [DISTWIDTH] IN BLOOD BY AUTOMATED COUNT: 13.2 % (ref 11–15)
FERRITIN SERPL-MCNC: 19 NG/ML (ref 16–232)
GLOBULIN SER CALC-MCNC: 3.1 G/DL (CALC) (ref 1.9–3.7)
GLUCOSE SERPL-MCNC: 90 MG/DL (ref 65–99)
GLUCOSE UR QL STRIP: NEGATIVE
HCT VFR BLD AUTO: 41.8 % (ref 35–45)
HGB BLD-MCNC: 13.6 G/DL (ref 11.7–15.5)
HGB UR QL STRIP: NEGATIVE
HYALINE CASTS #/AREA URNS LPF: ABNORMAL /LPF
KETONES UR QL STRIP: NEGATIVE
LEUKOCYTE ESTERASE UR QL STRIP: ABNORMAL
LYMPHOCYTES # BLD AUTO: 1911 CELLS/UL (ref 850–3900)
LYMPHOCYTES NFR BLD AUTO: 28.1 %
MCH RBC QN AUTO: 28.4 PG (ref 27–33)
MCHC RBC AUTO-ENTMCNC: 32.5 G/DL (ref 32–36)
MCV RBC AUTO: 87.3 FL (ref 80–100)
METHYLMALONATE SERPL-SCNC: 146 NMOL/L (ref 87–318)
MONOCYTES # BLD AUTO: 503 CELLS/UL (ref 200–950)
MONOCYTES NFR BLD AUTO: 7.4 %
NEUTROPHILS # BLD AUTO: 4066 CELLS/UL (ref 1500–7800)
NEUTROPHILS NFR BLD AUTO: 59.8 %
NITRITE UR QL STRIP: NEGATIVE
PH UR STRIP: 6.5 [PH] (ref 5–8)
PLATELET # BLD AUTO: 246 THOUSAND/UL (ref 140–400)
PMV BLD REES-ECKER: 10 FL (ref 7.5–12.5)
POTASSIUM SERPL-SCNC: 3.9 MMOL/L (ref 3.5–5.3)
PROT SERPL-MCNC: 7.4 G/DL (ref 6.1–8.1)
PROT UR QL STRIP: NEGATIVE
RBC # BLD AUTO: 4.79 MILLION/UL (ref 3.8–5.1)
RBC #/AREA URNS HPF: ABNORMAL /HPF
SL AMB EGFR AFRICAN AMERICAN: 71 ML/MIN/1.73M2
SL AMB EGFR NON AFRICAN AMERICAN: 61 ML/MIN/1.73M2
SODIUM SERPL-SCNC: 142 MMOL/L (ref 135–146)
SP GR UR STRIP: 1.01 (ref 1–1.03)
SQUAMOUS #/AREA URNS HPF: ABNORMAL /HPF
TSH SERPL-ACNC: 1.8 MIU/L (ref 0.4–4.5)
VIT B12 SERPL-MCNC: 453 PG/ML (ref 200–1100)
WBC # BLD AUTO: 6.8 THOUSAND/UL (ref 3.8–10.8)
WBC #/AREA URNS HPF: ABNORMAL /HPF

## 2020-02-18 LAB — CALPROTECTIN STL-MCNT: 25.3 MCG/G

## 2020-02-19 DIAGNOSIS — A04.8 H. PYLORI INFECTION: Primary | ICD-10-CM

## 2020-02-19 RX ORDER — TETRACYCLINE HYDROCHLORIDE 500 MG/1
500 CAPSULE ORAL 4 TIMES DAILY
Qty: 56 CAPSULE | Refills: 0 | Status: SHIPPED | OUTPATIENT
Start: 2020-02-19 | End: 2020-03-04

## 2020-02-19 RX ORDER — METRONIDAZOLE 250 MG/1
250 TABLET ORAL 4 TIMES DAILY
Qty: 56 TABLET | Refills: 0 | Status: SHIPPED | OUTPATIENT
Start: 2020-02-19 | End: 2020-03-04

## 2020-02-21 ENCOUNTER — TELEPHONE (OUTPATIENT)
Dept: FAMILY MEDICINE CLINIC | Facility: CLINIC | Age: 55
End: 2020-02-21

## 2020-02-28 ENCOUNTER — TELEPHONE (OUTPATIENT)
Dept: FAMILY MEDICINE CLINIC | Facility: CLINIC | Age: 55
End: 2020-02-28

## 2020-02-28 RX ORDER — ONDANSETRON 4 MG/1
4 TABLET, FILM COATED ORAL EVERY 8 HOURS PRN
Qty: 20 TABLET | Refills: 0 | Status: CANCELLED | OUTPATIENT
Start: 2020-02-28

## 2020-02-28 NOTE — TELEPHONE ENCOUNTER
Patient called complaining of problem of nausea and upset stomach on recent medications  Patient has been on tetracycline and Flagyl four times daily since 2/19, ordered by Dr Chaparro Kidd  She was is also taking Pepto Bismol and Prilosec with no relief  Her diarrhea has resolved  Please advise regarding meds  Thank you

## 2020-02-28 NOTE — TELEPHONE ENCOUNTER
Call patient  Stop antibiotics  Use Zofran 4 mg every 8 hours as needed for nausea and vomiting 20   No refills

## 2020-02-28 NOTE — TELEPHONE ENCOUNTER
Call patient  Patient should completed 9 days of antibiotics based on the fact that she started them on the 19th    Would recommend observing off antibiotics till next week

## 2020-02-28 NOTE — TELEPHONE ENCOUNTER
Spoke with patient per Dr Serene Cornelius  She understands and will discontinue antibiotics over weekend

## 2020-02-28 NOTE — TELEPHONE ENCOUNTER
Advised patient to stop antibiotics and start Zofran 4 mg    Patient would like to know hat else can she take for the infection

## 2020-03-04 ENCOUNTER — OFFICE VISIT (OUTPATIENT)
Dept: FAMILY MEDICINE CLINIC | Facility: CLINIC | Age: 55
End: 2020-03-04
Payer: COMMERCIAL

## 2020-03-04 VITALS
SYSTOLIC BLOOD PRESSURE: 134 MMHG | OXYGEN SATURATION: 98 % | WEIGHT: 201 LBS | BODY MASS INDEX: 42.19 KG/M2 | DIASTOLIC BLOOD PRESSURE: 60 MMHG | HEIGHT: 58 IN | HEART RATE: 74 BPM

## 2020-03-04 DIAGNOSIS — A04.8 H. PYLORI INFECTION: Primary | ICD-10-CM

## 2020-03-04 PROCEDURE — 3008F BODY MASS INDEX DOCD: CPT | Performed by: FAMILY MEDICINE

## 2020-03-04 PROCEDURE — 1036F TOBACCO NON-USER: CPT | Performed by: FAMILY MEDICINE

## 2020-03-04 PROCEDURE — 99213 OFFICE O/P EST LOW 20 MIN: CPT | Performed by: FAMILY MEDICINE

## 2020-03-05 PROBLEM — A04.8 H. PYLORI INFECTION: Status: ACTIVE | Noted: 2020-03-05

## 2020-03-05 NOTE — PROGRESS NOTES
Assessment/Plan:    40-year-old woman with:  H pylori infection  Discussed treatment options with risks and benefits  Patient opts to restart the antibiotics and take probiotics as well to try to mitigate the GI side effects  Encouraged her to see GI as well and advised to call back if symptoms are not improving or if they worsen    No problem-specific Assessment & Plan notes found for this encounter  Diagnoses and all orders for this visit:    H  pylori infection          Subjective:     Chief Complaint   Patient presents with    Follow-up     pt presents for a f/u regarding the out come of her stool test         Patient ID: Heena Espinoza is a 62 y o  female  Patient is a 40-year-old woman who presents for follow-up on H pylori infection  She was diagnosed due to recent abdominal pain and diarrhea on bloating  She was sent in script for antibiotics and she admits that she was having some abdominal pain and diarrhea after several days so she called and was told stopping antibiotics  Patient admits that her symptoms are only minimally better so she would like to restart the antibiotics  She is able to tolerate p o  Intake  No dizziness lightheadedness or near-syncope  No nausea vomiting at this time  No fevers chills  No other complaints at this time      The following portions of the patient's history were reviewed and updated as appropriate: allergies, current medications, past family history, past medical history, past social history, past surgical history and problem list     Review of Systems   Constitutional: Negative  HENT: Negative  Eyes: Negative  Respiratory: Negative  Cardiovascular: Negative  Gastrointestinal: Positive for abdominal pain  Endocrine: Negative  Genitourinary: Negative  Musculoskeletal: Negative  Allergic/Immunologic: Negative  Neurological: Negative  Hematological: Negative  Psychiatric/Behavioral: Negative      All other systems reviewed and are negative  Objective:      /60 (BP Location: Right arm, Patient Position: Sitting, Cuff Size: Standard)   Pulse 74   Ht 4' 10" (1 473 m)   Wt 91 2 kg (201 lb)   LMP  (LMP Unknown)   SpO2 98%   BMI 42 01 kg/m²          Physical Exam   Constitutional: She is oriented to person, place, and time  She appears well-developed and well-nourished  HENT:   Head: Atraumatic  Right Ear: External ear normal    Left Ear: External ear normal    Eyes: Pupils are equal, round, and reactive to light  Conjunctivae and EOM are normal    Neck: Normal range of motion  Cardiovascular: Normal rate, regular rhythm and normal heart sounds  Pulmonary/Chest: Effort normal and breath sounds normal  No respiratory distress  Abdominal: Soft  She exhibits no distension  There is no tenderness  There is no rebound and no guarding  Musculoskeletal: Normal range of motion  Neurological: She is alert and oriented to person, place, and time  No cranial nerve deficit  Skin: Skin is warm and dry  Psychiatric: She has a normal mood and affect   Her behavior is normal  Judgment and thought content normal

## 2020-03-10 ENCOUNTER — HOSPITAL ENCOUNTER (OUTPATIENT)
Dept: ULTRASOUND IMAGING | Facility: HOSPITAL | Age: 55
Discharge: HOME | End: 2020-03-10
Payer: COMMERCIAL

## 2020-03-10 DIAGNOSIS — R93.89 THICKENED ENDOMETRIUM: ICD-10-CM

## 2020-03-10 PROCEDURE — 76856 US EXAM PELVIC COMPLETE: CPT

## 2020-03-10 PROCEDURE — 76830 TRANSVAGINAL US NON-OB: CPT

## 2020-03-13 ENCOUNTER — TELEPHONE (OUTPATIENT)
Dept: OBGYN CLINIC | Facility: CLINIC | Age: 55
End: 2020-03-13

## 2020-03-13 NOTE — TELEPHONE ENCOUNTER
U/S results finding "Stable thickened and heterogeneous/cystic endometrium" to consider repeat bx   Review U/S

## 2020-03-19 ENCOUNTER — TELEPHONE (OUTPATIENT)
Dept: OBGYN CLINIC | Facility: CLINIC | Age: 55
End: 2020-03-19

## 2020-03-19 DIAGNOSIS — Z01.818 ENCOUNTER FOR OTHER PREPROCEDURAL EXAMINATION: Primary | ICD-10-CM

## 2020-03-19 DIAGNOSIS — N88.2 CERVICAL OS STENOSIS: ICD-10-CM

## 2020-03-19 RX ORDER — MISOPROSTOL 200 UG/1
200 TABLET ORAL ONCE
Qty: 1 TABLET | Refills: 0 | Status: SHIPPED | OUTPATIENT
Start: 2020-03-19 | End: 2020-07-14

## 2020-03-19 NOTE — TELEPHONE ENCOUNTER
----- Message from Aly Jack PA-C sent at 3/18/2020  3:52 PM EDT -----  Pt has appt scheduled for endo bx w [facility]  It is supposed to be scheduled w the doc so please reschedule that, she will also need cytotec the night prior to appt to see if we can get better access through her cx  Thanks! Cytotec dosage would be 200 mg the night prior to procedure

## 2020-04-07 ENCOUNTER — OFFICE VISIT (OUTPATIENT)
Dept: FAMILY MEDICINE CLINIC | Facility: CLINIC | Age: 55
End: 2020-04-07
Payer: COMMERCIAL

## 2020-04-07 VITALS
HEIGHT: 58 IN | SYSTOLIC BLOOD PRESSURE: 112 MMHG | TEMPERATURE: 98.2 F | BODY MASS INDEX: 41.98 KG/M2 | WEIGHT: 200 LBS | DIASTOLIC BLOOD PRESSURE: 70 MMHG

## 2020-04-07 DIAGNOSIS — E53.8 VITAMIN B12 DEFICIENCY: ICD-10-CM

## 2020-04-07 DIAGNOSIS — R41.3 MEMORY LOSS: Primary | ICD-10-CM

## 2020-04-07 DIAGNOSIS — R30.0 DYSURIA: ICD-10-CM

## 2020-04-07 DIAGNOSIS — L72.0 EPIDERMOID CYST: ICD-10-CM

## 2020-04-07 PROCEDURE — 1036F TOBACCO NON-USER: CPT | Performed by: FAMILY MEDICINE

## 2020-04-07 PROCEDURE — 3008F BODY MASS INDEX DOCD: CPT | Performed by: FAMILY MEDICINE

## 2020-04-07 PROCEDURE — 99214 OFFICE O/P EST MOD 30 MIN: CPT | Performed by: FAMILY MEDICINE

## 2020-04-07 RX ORDER — SULFAMETHOXAZOLE AND TRIMETHOPRIM 800; 160 MG/1; MG/1
1 TABLET ORAL 2 TIMES DAILY
Qty: 14 TABLET | Refills: 0 | Status: SHIPPED | OUTPATIENT
Start: 2020-04-07 | End: 2020-04-14

## 2020-04-07 RX ORDER — CIPROFLOXACIN 500 MG/1
500 TABLET, FILM COATED ORAL EVERY 12 HOURS SCHEDULED
Qty: 10 TABLET | Refills: 0 | Status: SHIPPED | OUTPATIENT
Start: 2020-04-07 | End: 2020-04-12

## 2020-04-09 ENCOUNTER — TELEPHONE (OUTPATIENT)
Dept: OBGYN CLINIC | Facility: CLINIC | Age: 55
End: 2020-04-09

## 2020-04-09 ENCOUNTER — APPOINTMENT (OUTPATIENT)
Dept: LAB | Facility: HOSPITAL | Age: 55
End: 2020-04-09
Payer: COMMERCIAL

## 2020-04-09 LAB
BILIRUB UR QL STRIP: NEGATIVE
CLARITY UR: CLEAR
COLOR UR: YELLOW
GLUCOSE UR STRIP-MCNC: NEGATIVE MG/DL
HGB UR QL STRIP.AUTO: NEGATIVE
KETONES UR STRIP-MCNC: NEGATIVE MG/DL
LEUKOCYTE ESTERASE UR QL STRIP: NEGATIVE
NITRITE UR QL STRIP: NEGATIVE
PH UR STRIP.AUTO: 6 [PH]
PROT UR STRIP-MCNC: NEGATIVE MG/DL
SP GR UR STRIP.AUTO: <=1.005 (ref 1–1.03)
UROBILINOGEN UR QL STRIP.AUTO: 0.2 E.U./DL

## 2020-04-09 PROCEDURE — 81003 URINALYSIS AUTO W/O SCOPE: CPT | Performed by: FAMILY MEDICINE

## 2020-04-17 ENCOUNTER — TELEMEDICINE (OUTPATIENT)
Dept: GASTROENTEROLOGY | Facility: MEDICAL CENTER | Age: 55
End: 2020-04-17
Payer: COMMERCIAL

## 2020-04-17 ENCOUNTER — TELEPHONE (OUTPATIENT)
Dept: GASTROENTEROLOGY | Facility: MEDICAL CENTER | Age: 55
End: 2020-04-17

## 2020-04-17 DIAGNOSIS — A04.8 H. PYLORI INFECTION: ICD-10-CM

## 2020-04-17 DIAGNOSIS — K21.00 GASTROESOPHAGEAL REFLUX DISEASE WITH ESOPHAGITIS: Primary | ICD-10-CM

## 2020-04-17 DIAGNOSIS — R56.9 SEIZURE (HCC): ICD-10-CM

## 2020-04-17 DIAGNOSIS — Z12.11 SCREENING FOR COLON CANCER: ICD-10-CM

## 2020-04-17 PROCEDURE — G2012 BRIEF CHECK IN BY MD/QHP: HCPCS | Performed by: INTERNAL MEDICINE

## 2020-04-30 ENCOUNTER — TELEPHONE (OUTPATIENT)
Dept: OBGYN CLINIC | Facility: CLINIC | Age: 55
End: 2020-04-30

## 2020-04-30 ENCOUNTER — PROCEDURE VISIT (OUTPATIENT)
Dept: OBGYN CLINIC | Facility: CLINIC | Age: 55
End: 2020-04-30
Payer: COMMERCIAL

## 2020-04-30 VITALS
SYSTOLIC BLOOD PRESSURE: 110 MMHG | BODY MASS INDEX: 42.07 KG/M2 | HEIGHT: 58 IN | WEIGHT: 200.4 LBS | DIASTOLIC BLOOD PRESSURE: 68 MMHG

## 2020-04-30 DIAGNOSIS — R93.89 INCREASED ENDOMETRIAL STRIPE THICKNESS: Primary | ICD-10-CM

## 2020-04-30 PROCEDURE — 88305 TISSUE EXAM BY PATHOLOGIST: CPT | Performed by: PATHOLOGY

## 2020-04-30 PROCEDURE — 58100 BIOPSY OF UTERUS LINING: CPT | Performed by: OBSTETRICS & GYNECOLOGY

## 2020-05-13 ENCOUNTER — HOSPITAL ENCOUNTER (OUTPATIENT)
Dept: MAMMOGRAPHY | Facility: CLINIC | Age: 55
Discharge: HOME | End: 2020-05-13
Payer: COMMERCIAL

## 2020-05-13 DIAGNOSIS — R92.8 ABNORMAL FINDINGS ON DIAGNOSTIC IMAGING OF BREAST: ICD-10-CM

## 2020-05-13 PROCEDURE — 77065 DX MAMMO INCL CAD UNI: CPT

## 2020-05-18 ENCOUNTER — TELEPHONE (OUTPATIENT)
Dept: OBGYN CLINIC | Facility: CLINIC | Age: 55
End: 2020-05-18

## 2020-05-19 DIAGNOSIS — Z12.31 ENCOUNTER FOR SCREENING MAMMOGRAM FOR BREAST CANCER: ICD-10-CM

## 2020-05-19 DIAGNOSIS — R92.8 ABNORMAL MAMMOGRAM: Primary | ICD-10-CM

## 2020-06-05 ENCOUNTER — RX ONLY (RX ONLY)
Age: 55
End: 2020-06-05

## 2020-06-05 ENCOUNTER — DOCTOR'S OFFICE (OUTPATIENT)
Dept: URBAN - METROPOLITAN AREA CLINIC 136 | Facility: CLINIC | Age: 55
Setting detail: OPHTHALMOLOGY
End: 2020-06-05
Payer: COMMERCIAL

## 2020-06-05 VITALS — HEIGHT: 55 IN

## 2020-06-05 DIAGNOSIS — H04.123: ICD-10-CM

## 2020-06-05 DIAGNOSIS — H25.013: ICD-10-CM

## 2020-06-05 DIAGNOSIS — H40.033: ICD-10-CM

## 2020-06-05 DIAGNOSIS — H00.11: ICD-10-CM

## 2020-06-05 PROBLEM — H52.03 HYPEROPIA, PRESBYOPIA OU ; BOTH EYES: Status: ACTIVE | Noted: 2018-12-28

## 2020-06-05 PROBLEM — H52.4 HYPEROPIA, PRESBYOPIA OU ; BOTH EYES: Status: ACTIVE | Noted: 2018-12-28

## 2020-06-05 PROCEDURE — 92012 INTRM OPH EXAM EST PATIENT: CPT | Performed by: OPHTHALMOLOGY

## 2020-06-05 PROCEDURE — 92132 CPTRZD OPH DX IMG ANT SGM: CPT | Performed by: OPHTHALMOLOGY

## 2020-06-05 PROCEDURE — 92020 GONIOSCOPY: CPT | Performed by: OPHTHALMOLOGY

## 2020-06-05 ASSESSMENT — CONFRONTATIONAL VISUAL FIELD TEST (CVF)
OS_FINDINGS: FULL
OD_FINDINGS: FULL

## 2020-06-08 PROBLEM — H40.032 ANATOMICAL NARROW ANGLE; RIGHT EYE, LEFT EYE: Status: ACTIVE | Noted: 2020-06-05

## 2020-06-08 PROBLEM — H40.031 ANATOMICAL NARROW ANGLE; RIGHT EYE, LEFT EYE: Status: ACTIVE | Noted: 2020-06-05

## 2020-06-10 ASSESSMENT — KERATOMETRY
OD_K1POWER_DIOPTERS: 46.50
OS_AXISANGLE_DEGREES: 158
OS_K1POWER_DIOPTERS: 46.75
OD_K2POWER_DIOPTERS: 47.25
OS_K2POWER_DIOPTERS: 47.00
OD_AXISANGLE_DEGREES: 105

## 2020-06-10 ASSESSMENT — AXIALLENGTH_DERIVED
OS_AL: 22.4141
OD_AL: 22.2397
OD_AL: 22.3703

## 2020-06-10 ASSESSMENT — REFRACTION_MANIFEST
OS_VA1: 20/20
OD_CYLINDER: -0.50
OD_VA2: 20/20(J1+)
OS_SPHERE: +0.75
OD_VA1: 20/20-2
OS_CYLINDER: SPH
OS_VA2: 20/20(J1+)
OD_AXIS: 020
OU_VA: 20/20
OS_ADD: +2.50
OD_SPHERE: +0.75
OD_ADD: +2.50

## 2020-06-10 ASSESSMENT — REFRACTION_CURRENTRX
OD_ADD: +2.50
OS_OVR_VA: 20/
OS_CYLINDER: SPHERE
OD_SPHERE: +0.75
OD_CYLINDER: SPHERE
OS_ADD: +2.50
OS_VPRISM_DIRECTION: BF
OD_VPRISM_DIRECTION: BF
OS_SPHERE: +0.75
OD_OVR_VA: 20/

## 2020-06-10 ASSESSMENT — SPHEQUIV_DERIVED
OD_SPHEQUIV: 0.5
OS_SPHEQUIV: 0
OD_SPHEQUIV: 0.125

## 2020-06-10 ASSESSMENT — REFRACTION_AUTOREFRACTION
OS_SPHERE: +0.75
OS_CYLINDER: -1.50
OD_CYLINDER: -1.25
OD_SPHERE: +0.75
OD_AXIS: 20
OS_AXIS: 169

## 2020-06-10 ASSESSMENT — VISUAL ACUITY
OS_BCVA: 20/50+2
OD_BCVA: 20/25-2

## 2020-06-23 ENCOUNTER — APPOINTMENT (EMERGENCY)
Dept: CT IMAGING | Facility: HOSPITAL | Age: 55
End: 2020-06-23
Payer: OTHER MISCELLANEOUS

## 2020-06-23 ENCOUNTER — HOSPITAL ENCOUNTER (EMERGENCY)
Facility: HOSPITAL | Age: 55
Discharge: HOME | End: 2020-06-23
Attending: EMERGENCY MEDICINE
Payer: OTHER MISCELLANEOUS

## 2020-06-23 ENCOUNTER — APPOINTMENT (EMERGENCY)
Dept: RADIOLOGY | Facility: HOSPITAL | Age: 55
End: 2020-06-23
Payer: OTHER MISCELLANEOUS

## 2020-06-23 VITALS
WEIGHT: 202.16 LBS | TEMPERATURE: 98.2 F | RESPIRATION RATE: 20 BRPM | DIASTOLIC BLOOD PRESSURE: 68 MMHG | HEART RATE: 68 BPM | OXYGEN SATURATION: 98 % | BODY MASS INDEX: 42.25 KG/M2 | SYSTOLIC BLOOD PRESSURE: 104 MMHG

## 2020-06-23 DIAGNOSIS — T59.811A SMOKE INHALATION: Primary | ICD-10-CM

## 2020-06-23 LAB — GAS + CO PNL BLDA: 1.8 % (ref 0–1.5)

## 2020-06-23 PROCEDURE — 70450 CT HEAD/BRAIN W/O DYE: CPT

## 2020-06-23 PROCEDURE — 36415 COLL VENOUS BLD VENIPUNCTURE: CPT | Performed by: EMERGENCY MEDICINE

## 2020-06-23 PROCEDURE — 99284 EMERGENCY DEPT VISIT MOD MDM: CPT | Performed by: EMERGENCY MEDICINE

## 2020-06-23 PROCEDURE — 82375 ASSAY CARBOXYHB QUANT: CPT | Performed by: EMERGENCY MEDICINE

## 2020-06-23 PROCEDURE — 99285 EMERGENCY DEPT VISIT HI MDM: CPT

## 2020-06-23 PROCEDURE — 71046 X-RAY EXAM CHEST 2 VIEWS: CPT

## 2020-06-23 RX ORDER — DIPHENHYDRAMINE HCL 25 MG
25 TABLET ORAL ONCE
Status: COMPLETED | OUTPATIENT
Start: 2020-06-23 | End: 2020-06-23

## 2020-06-23 RX ADMIN — DIPHENHYDRAMINE HCL 25 MG: 25 TABLET ORAL at 15:32

## 2020-07-14 ENCOUNTER — ANNUAL EXAM (OUTPATIENT)
Dept: OBGYN CLINIC | Facility: CLINIC | Age: 55
End: 2020-07-14
Payer: COMMERCIAL

## 2020-07-14 VITALS
WEIGHT: 202.4 LBS | DIASTOLIC BLOOD PRESSURE: 72 MMHG | TEMPERATURE: 97.4 F | SYSTOLIC BLOOD PRESSURE: 110 MMHG | HEIGHT: 58 IN | BODY MASS INDEX: 42.49 KG/M2

## 2020-07-14 DIAGNOSIS — Z12.39 BREAST CANCER SCREENING: ICD-10-CM

## 2020-07-14 DIAGNOSIS — Z01.419 WELL WOMAN EXAM: Primary | ICD-10-CM

## 2020-07-14 PROBLEM — M47.812 SPONDYLOSIS OF CERVICAL REGION WITHOUT MYELOPATHY OR RADICULOPATHY: Status: ACTIVE | Noted: 2020-07-14

## 2020-07-14 PROBLEM — R32 URINARY INCONTINENCE: Status: ACTIVE | Noted: 2020-07-14

## 2020-07-14 PROCEDURE — 3008F BODY MASS INDEX DOCD: CPT | Performed by: PHYSICIAN ASSISTANT

## 2020-07-14 PROCEDURE — 99396 PREV VISIT EST AGE 40-64: CPT | Performed by: PHYSICIAN ASSISTANT

## 2020-07-14 NOTE — PROGRESS NOTES
Assessment/Plan   Diagnoses and all orders for this visit:    Well woman exam    Breast cancer screening        Discussion    All questions have been answered to her satisfaction  RTO for APE or sooner if needed      Subjective     HPI   Marta Daily is a 60 y o  female who presents for annual well woman exam    LMP - ; Denies  bleeding  No vulvar itch/burn; No vaginal itch/burn; No abn discharge or odor; No urinary sx - burning/pain/frequency/hematuria  (+) SBEs - no breast masses, asymmetry, nipple discharge or bleeding, changes in skin of breast, or breast tenderness bilaterally  No abd/pelvic pain or HAs; No menopausal symptoms: No hot flashes/night sweats, problems with intercourse, vaginal dryness; sleeping well  Pt is sexually active in a mutually monog/ sexual relationship; No issues with intercourse; She declines std/hiv/hep testing; Feels safe at home  Condom use:  (+) PCP for routine Bw/care; Last Pap - 19 WNL  History of abnormal Pap smear:   Last mammo - 20 diagnostic mammo focal asymetry, no changes since prior study f/u 6 mths  History of abnormal mammogram:   Last colonoscopy -       Review of Systems   Constitutional: Negative  Respiratory: Negative  Gastrointestinal: Positive for constipation  Also w changes in stool  Endocrine: Negative  Genitourinary: Negative          The following portions of the patient's history were reviewed and updated as appropriate: current medications, past family history, past medical history, past social history, past surgical history and problem list          OB History        2    Para   2    Term   2            AB        Living           SAB        TAB        Ectopic        Multiple        Live Births   2                 Past Medical History:   Diagnosis Date    Adhesive capsulitis of shoulder     LEFT    Anemia     Anesthesia complication     difficulty awakening    Milk intolerance     Pneumonia     [facility] [facility] [address]     last seizure     Se[facility]ures [facility] [address]     Sexually transmitted disease     Urinary incontinence     Wears dentures     full upper and partial lower - doesnt wear lower    Wears glasses     reading glasses       Past Surgical History:   Procedure Laterality Date    BLADDER SUSPENSION      CHOLECYSTECTOMY      Laparoscopic    INDUCED       ME SHLDR ARTHROSCOP,PART ACROMIOPLAS Left 2016    Procedure: ARTHROSCOPY SHOULDER ,LYSIS OF ADHESIONS MANIPULATION UNDER ANESTHESIA; INJECTION OF LEFT SHOULDER;  Surgeon: Kay Bhat MD;  Location: [facility];  Service: Orthopedics    TUBAL LIGATION         Family History   Problem Relation Age of Onset    Cancer Mother         carcinoma in Situ    Diabetes Mother     Hypertension Mother     Stroke Mother         syndrome    Brain cancer Mother     Brain cancer Family     No Known Problems Father     No Known Problems Daughter     No Known Problems Maternal Grandmother     No Known Problems Maternal Grandfather     No Known Problems Paternal Grandmother     No Known Problems Paternal Grandfather     No Known Problems Paternal Aunt     No Known Problems Paternal Aunt     No Known Problems Paternal Aunt     No Known Problems Son        Social History     Socioeconomic History    Marital status: Legally      Spouse name: Not on file    Number of children: Not on file    Years of education: Not on file    Highest education level: Not on file   Occupational History     Comment: working full-time   Social Needs    Financial resource strain: Not on file    Food insecurity:     Worry: Not on file     Inability: Not on file    Transportation needs:     Medical: Not on file     Non-medical: Not on file   Tobacco Use    Smoking status: Former Smoker     Last attempt to quit: 1991     Years since quittin 4    Smokeless tobacco: Never Used    Tobacco comment: Never a smoker, per [employer] Substance and Sexual Activity    Alcohol use: Yes     Comment: 1-2 x month, No alcohol use, per allscripts    Drug use: No    Sexual activity: Not Currently     Partners: Male   Lifestyle    Physical activity:     Days per week: Not on file     Minutes per session: Not on file    Stress: Not on file   Relationships    Social connections:     Talks on phone: Not on file     Gets together: Not on file     Attends Confucianism service: Not on file     Active member of club or organization: Not on file     Attends meetings of clubs or organizations: Not on file     Relationship status: Not on file    Intimate partner violence:     Fear of current or ex partner: Not on file     Emotionally abused: Not on file     Physically abused: Not on file     Forced sexual activity: Not on file   Other Topics Concern    Not on file   Social History Narrative    Lives with significant other    , per allscripts         Current Outpatient Medications:     Artificial Tear Solution (GENTEAL TEARS) 0 1-0 2-0 3 % SOLN, Apply 2 drops to eye 4 (four) times a day, Disp: , Rfl:     Cinnamon 500 MG capsule, Take 1,000 mg by mouth daily, Disp: , Rfl:     Cyanocobalamin (VITAMIN B-12 PO), Take by mouth as needed , Disp: , Rfl:     GENTEAL TEARS PF 0 1-0 3 % SOLN, , Disp: , Rfl: 0    Lecithin 1000 MG CHEW, Chew 2 (two) times a day as needed , Disp: , Rfl:     misoprostol (CYTOTEC) 200 mcg tablet, Take 1 tablet (200 mcg total) by mouth once for 1 dose, Disp: 1 tablet, Rfl: 0    naproxen (NAPROSYN) 500 mg tablet, Take 0 5 tablets (250 mg total) by mouth 2 (two) times a day with meals (Patient taking differently: Take 250 mg by mouth 2 (two) times a day as needed ), Disp: 30 tablet, Rfl: 0    ondansetron (ZOFRAN-ODT) 4 mg disintegrating tablet, Take 1 tablet (4 mg total) by mouth every 6 (six) hours as needed for nausea or vomiting, Disp: 30 tablet, Rfl: 0    topiramate (TOPAMAX) 100 mg tablet, Take 100 mg by mouth 2 (two) times a day , Disp: , Rfl:     Allergies   Allergen Reactions    Codeine Other (See Comments)     hallucinations    Lactose Intolerance (Gi)      Pt states she gets "gassy"    Penicillins Hives       Objective   There were no vitals filed for this visit  Physical Exam   Constitutional: She is oriented to person, place, and time  She appears well-developed and well-nourished  HENT:   Head: Normocephalic and atraumatic  Cardiovascular: Normal rate and regular rhythm  Pulmonary/Chest: Effort normal and breath sounds normal  Right breast exhibits no inverted nipple, no mass, no nipple discharge, no skin change and no tenderness  Left breast exhibits no inverted nipple, no mass, no nipple discharge, no skin change and no tenderness  Breasts are symmetrical    Abdominal: Soft  She exhibits no distension and no mass  There is no rebound and no guarding  Genitourinary: Uterus normal  Pelvic exam was performed with patient supine  There is no rash on the right labia  There is no rash on the left labia  Cervix exhibits no motion tenderness  Right adnexum displays no mass  Left adnexum displays no mass  No erythema in the vagina  No foreign body in the vagina  No signs of injury around the vagina  No vaginal discharge found  Neurological: She is alert and oriented to person, place, and time  Skin: Skin is warm and dry  Psychiatric: She has a normal mood and affect  There are no Patient Instructions on file for this visit

## 2020-07-14 NOTE — PATIENT INSTRUCTIONS
Pap deferred today due to guidelines  Pt has slips for f/u mammo due in the fall and will plan to follow up at that point  Advised importance of PCP follow up for changes in stool color and consistency

## 2020-11-13 ENCOUNTER — HOSPITAL ENCOUNTER (OUTPATIENT)
Dept: MAMMOGRAPHY | Facility: CLINIC | Age: 55
Discharge: HOME | End: 2020-11-13
Payer: COMMERCIAL

## 2020-11-13 DIAGNOSIS — R92.8 ABNORMAL MAMMOGRAM: ICD-10-CM

## 2020-11-13 PROCEDURE — 77066 DX MAMMO INCL CAD BI: CPT

## 2020-11-13 PROCEDURE — G0279 TOMOSYNTHESIS, MAMMO: HCPCS

## 2021-01-19 ENCOUNTER — TELEPHONE (OUTPATIENT)
Dept: FAMILY MEDICINE CLINIC | Facility: CLINIC | Age: 56
End: 2021-01-19

## 2021-01-19 NOTE — TELEPHONE ENCOUNTER
Pt was exposed to a person that was deemed + for COVID on 1/16/21  She asked me what is the next step  I told her she must quarantine for 10-14 days  And if she has an sx within that time to call us  She also stated that her employer will need a clearance letter for stating the same  I did tell her unless she has signs and sx of COVID we would not be testing her, but that she could go to the [employer] [facility] and get tested    She needs to find out what her employer wants her to do

## 2021-01-25 ENCOUNTER — APPOINTMENT (OUTPATIENT)
Dept: RADIOLOGY | Facility: MEDICAL CENTER | Age: 56
End: 2021-01-25
Payer: OTHER MISCELLANEOUS

## 2021-01-25 ENCOUNTER — OCCMED (OUTPATIENT)
Dept: URGENT CARE | Facility: MEDICAL CENTER | Age: 56
End: 2021-01-25
Payer: OTHER MISCELLANEOUS

## 2021-01-25 DIAGNOSIS — S93.401A SPRAIN OF RIGHT ANKLE, UNSPECIFIED LIGAMENT, INITIAL ENCOUNTER: ICD-10-CM

## 2021-01-25 DIAGNOSIS — S93.401A SPRAIN OF RIGHT ANKLE, UNSPECIFIED LIGAMENT, INITIAL ENCOUNTER: Primary | ICD-10-CM

## 2021-01-25 PROCEDURE — 73610 X-RAY EXAM OF ANKLE: CPT

## 2021-01-25 PROCEDURE — 73630 X-RAY EXAM OF FOOT: CPT

## 2021-01-25 PROCEDURE — G0382 LEV 3 HOSP TYPE B ED VISIT: HCPCS | Performed by: PHYSICIAN ASSISTANT

## 2021-01-25 PROCEDURE — 99283 EMERGENCY DEPT VISIT LOW MDM: CPT | Performed by: PHYSICIAN ASSISTANT
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
## 2021-01-28 ENCOUNTER — TELEPHONE (OUTPATIENT)
Dept: OBGYN CLINIC | Facility: HOSPITAL | Age: 56
End: 2021-01-28

## 2021-01-28 ENCOUNTER — APPOINTMENT (OUTPATIENT)
Dept: LAB | Facility: HOSPITAL | Age: 56
End: 2021-01-28
Attending: FAMILY MEDICINE
Payer: OTHER MISCELLANEOUS

## 2021-01-28 ENCOUNTER — OFFICE VISIT (OUTPATIENT)
Dept: OBGYN CLINIC | Facility: OTHER | Age: 56
End: 2021-01-28
Payer: OTHER MISCELLANEOUS

## 2021-01-28 VITALS
WEIGHT: 205 LBS | BODY MASS INDEX: 42.85 KG/M2 | HEART RATE: 67 BPM | SYSTOLIC BLOOD PRESSURE: 118 MMHG | DIASTOLIC BLOOD PRESSURE: 74 MMHG

## 2021-01-28 DIAGNOSIS — S92.354A CLOSED NONDISPLACED FRACTURE OF FIFTH METATARSAL BONE OF RIGHT FOOT, INITIAL ENCOUNTER: Primary | ICD-10-CM

## 2021-01-28 DIAGNOSIS — S92.354A CLOSED NONDISPLACED FRACTURE OF FIFTH METATARSAL BONE OF RIGHT FOOT, INITIAL ENCOUNTER: ICD-10-CM

## 2021-01-28 LAB
25(OH)D3 SERPL-MCNC: 17.9 NG/ML (ref 30–100)
BACTERIA UR QL AUTO: ABNORMAL /HPF
BILIRUB UR QL STRIP: NEGATIVE
CLARITY UR: CLEAR
COLOR UR: YELLOW
GLUCOSE UR STRIP-MCNC: NEGATIVE MG/DL
HGB UR QL STRIP.AUTO: NEGATIVE
KETONES UR STRIP-MCNC: NEGATIVE MG/DL
LEUKOCYTE ESTERASE UR QL STRIP: ABNORMAL
NITRITE UR QL STRIP: NEGATIVE
NON-SQ EPI CELLS URNS QL MICRO: ABNORMAL /HPF
PH UR STRIP.AUTO: 5.5 [PH]
PROT UR STRIP-MCNC: NEGATIVE MG/DL
RBC #/AREA URNS AUTO: ABNORMAL /HPF
SP GR UR STRIP.AUTO: 1.02 (ref 1–1.03)
UROBILINOGEN UR QL STRIP.AUTO: 0.2 E.U./DL
WBC #/AREA URNS AUTO: ABNORMAL /HPF

## 2021-01-28 PROCEDURE — 81001 URINALYSIS AUTO W/SCOPE: CPT | Performed by: FAMILY MEDICINE

## 2021-01-28 PROCEDURE — 99214 OFFICE O/P EST MOD 30 MIN: CPT | Performed by: FAMILY MEDICINE

## 2021-01-28 PROCEDURE — 82306 VITAMIN D 25 HYDROXY: CPT

## 2021-01-28 PROCEDURE — 36415 COLL VENOUS BLD VENIPUNCTURE: CPT

## 2021-01-28 RX ORDER — IBUPROFEN 600 MG/1
TABLET ORAL EVERY 6 HOURS PRN
COMMUNITY
End: 2021-02-15

## 2021-01-28 RX ORDER — TOPIRAMATE 100 MG/1
100 TABLET, FILM COATED ORAL 2 TIMES DAILY
COMMUNITY
Start: 2021-01-14 | End: 2021-05-24

## 2021-01-28 RX ORDER — CALCIUM CARBONATE 160(400)MG
TABLET,CHEWABLE ORAL DAILY
Qty: 1 EACH | Refills: 0 | Status: SHIPPED | OUTPATIENT
Start: 2021-01-28

## 2021-01-28 NOTE — LETTER
January 28, 2021     Patient: Sera Campos   YOB: 1965   Date of Visit: 1/28/2021       To Whom it May Concern:    Sera Campos is under my professional care  She was seen in my office on 1/28/2021  I recommend against work at this time  Patient to be re-evaluated in approximately 1-2 weeks  If you have any questions or concerns, please don't hesitate to call           Sincerely,          Tommy Roach III, DO        CC: Sera Campos
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
## 2021-01-28 NOTE — PATIENT INSTRUCTIONS
Explained the patient that she has a right 5th metatarsal fracture in the region of the bone that sometimes does not like to heel and is known as a George fracture  Due to this I recommended nonweightbearing  I will also check a vitamin D level and recommend calcium and vitamin-D supplementation daily to help with healing  I have also reach out to bone stimulator representative to determine if patient may be covered under her insurance for bone stimulator due to this high risk fracture for delayed union and healing or nonunion  At her next visit we will repeat x-rays and transition from posterior ankle splint to cast   I have also placed order for knee Rollator scooter  I recommend vitamin D 800-1000 IUs (international units) and Calcium 1500mg per day to help promote fracture healing  Calcium pills can lead to constipation and I recommend increasing calcium through the diet via dairy such as yogurt, cheese, or milk as tolerated  Vitamin D is usually taken by mouth in pill form over the counter  Sometimes vitamin D levels are too low and physicians may consider checking a blood test to see if you require extra Vitamin D for catch-up dosing if you are already low

## 2021-01-28 NOTE — TELEPHONE ENCOUNTER
[address] calling to state they deleted the prescription for Misc  Devices (Rollator Ultra-Light)  Has to be sent to a medical supply store as they do not supply this

## 2021-01-29 ENCOUNTER — APPOINTMENT (OUTPATIENT)
Dept: RADIOLOGY | Facility: OTHER | Age: 56
End: 2021-01-29
Payer: COMMERCIAL

## 2021-01-29 ENCOUNTER — OFFICE VISIT (OUTPATIENT)
Dept: OBGYN CLINIC | Facility: OTHER | Age: 56
End: 2021-01-29
Payer: COMMERCIAL

## 2021-01-29 ENCOUNTER — TELEPHONE (OUTPATIENT)
Dept: OBGYN CLINIC | Facility: HOSPITAL | Age: 56
End: 2021-01-29

## 2021-01-29 VITALS — BODY MASS INDEX: 42.85 KG/M2 | WEIGHT: 205 LBS

## 2021-01-29 DIAGNOSIS — M79.604 RIGHT LEG PAIN: ICD-10-CM

## 2021-01-29 DIAGNOSIS — M25.571 PAIN, JOINT, ANKLE AND FOOT, RIGHT: ICD-10-CM

## 2021-01-29 DIAGNOSIS — S92.354A CLOSED NONDISPLACED FRACTURE OF FIFTH METATARSAL BONE OF RIGHT FOOT, INITIAL ENCOUNTER: Primary | ICD-10-CM

## 2021-01-29 PROCEDURE — 1036F TOBACCO NON-USER: CPT | Performed by: FAMILY MEDICINE

## 2021-01-29 PROCEDURE — 99213 OFFICE O/P EST LOW 20 MIN: CPT | Performed by: FAMILY MEDICINE

## 2021-01-29 PROCEDURE — 29515 APPLICATION SHORT LEG SPLINT: CPT | Performed by: FAMILY MEDICINE

## 2021-01-29 PROCEDURE — 73590 X-RAY EXAM OF LOWER LEG: CPT

## 2021-01-29 NOTE — TELEPHONE ENCOUNTER
Have her loosen the splint and apply ice  If any severe pain or swelling she must go to the ER immediately

## 2021-01-29 NOTE — TELEPHONE ENCOUNTER
Patient sees Dr Nishi Feng cast placed on 1/28  Issue with cast  Thinks it's too tight  In the heel, the foot is swollen  Patient also feels random spurts of pain in her foot  Patient has been scheduled for today, 1/29 @ 2pm  Patient would like to know if something should be done before the appointment, or if this is even an appointment she should keep      Call back # [phone]

## 2021-01-29 NOTE — PATIENT INSTRUCTIONS
I reviewed red flags with patient including severe swelling redness or pain instructed to contact physician immediately if develops  If she has any discomfort she may loosen her splint  She is to remain nonweightbearing  New posterior ankle splint fashioned and placed today  reviewed  Splint precautions including instruction not to wet  Splint  instructed if any swelling, pain, numbness, tingling then to contact office immediately for instruction or go to ER if physician unavailable  reviewed risks of splint including joint stiffness, skin breakdown, ulceration, risk of infection if wedging objects behind cast  Patient expressed understanding and agreed to plan
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
## 2021-01-29 NOTE — TELEPHONE ENCOUNTER
Patient stated she is having some numbness and burning in the heal  Stated the heel is where she is getting the discomfort  She stated it feels tight in the heel  Advised patient to keep her appointment for a splint check and we will see her in the office then  Denies n/t to toes

## 2021-02-03 ENCOUNTER — TELEPHONE (OUTPATIENT)
Dept: OBGYN CLINIC | Facility: HOSPITAL | Age: 56
End: 2021-02-03

## 2021-02-03 ENCOUNTER — TELEPHONE (OUTPATIENT)
Dept: OBGYN CLINIC | Facility: CLINIC | Age: 56
End: 2021-02-03

## 2021-02-03 DIAGNOSIS — E55.9 VITAMIN D DEFICIENCY: Primary | ICD-10-CM

## 2021-02-03 RX ORDER — ERGOCALCIFEROL 1.25 MG/1
50000 CAPSULE ORAL WEEKLY
Qty: 6 CAPSULE | Refills: 0 | Status: SHIPPED | OUTPATIENT
Start: 2021-02-03 | End: 2021-05-24

## 2021-02-03 NOTE — TELEPHONE ENCOUNTER
Patient Sees Dr Kaleb Vasquez  Patient called and she might have to cancel her appt for tomorrow due to the weather, she will call back

## 2021-02-03 NOTE — TELEPHONE ENCOUNTER
Please inform patient vitamin D is low  We will start treatment as below  She is to pick-up RX  Please inform patient Vitamin D is low  This will require catch-up dosing taking one pill of vitamin D 50,000 IUs once per week for 6 weeks  Stop all other vitamin D pills while taking to avoid excessive dosing  Please have patient follow-up with PCP in 6 weeks to discuss monitoring Vitamin D status and need for long term daily maintenance dosing  In addition, I recommend Calcium 1500mg per day to help promote fracture healing  Calcium pills can lead to constipation and I recommend increasing calcium through the diet via dairy such as yogurt, cheese, or milk as tolerated

## 2021-02-04 ENCOUNTER — TELEPHONE (OUTPATIENT)
Dept: OBGYN CLINIC | Facility: HOSPITAL | Age: 56
End: 2021-02-04

## 2021-02-04 ENCOUNTER — TELEPHONE (OUTPATIENT)
Dept: OBGYN CLINIC | Facility: OTHER | Age: 56
End: 2021-02-04

## 2021-02-04 ENCOUNTER — APPOINTMENT (OUTPATIENT)
Dept: RADIOLOGY | Facility: OTHER | Age: 56
End: 2021-02-04
Payer: OTHER MISCELLANEOUS

## 2021-02-04 ENCOUNTER — OFFICE VISIT (OUTPATIENT)
Dept: OBGYN CLINIC | Facility: OTHER | Age: 56
End: 2021-02-04
Payer: OTHER MISCELLANEOUS

## 2021-02-04 VITALS — DIASTOLIC BLOOD PRESSURE: 76 MMHG | HEART RATE: 71 BPM | SYSTOLIC BLOOD PRESSURE: 120 MMHG

## 2021-02-04 DIAGNOSIS — E55.9 VITAMIN D DEFICIENCY: ICD-10-CM

## 2021-02-04 DIAGNOSIS — S92.354A CLOSED NONDISPLACED FRACTURE OF FIFTH METATARSAL BONE OF RIGHT FOOT, INITIAL ENCOUNTER: ICD-10-CM

## 2021-02-04 DIAGNOSIS — S99.191A CLOSED FRACTURE OF BASE OF FIFTH METATARSAL BONE OF RIGHT FOOT AT METAPHYSEAL-DIAPHYSEAL JUNCTION, INITIAL ENCOUNTER: Primary | ICD-10-CM

## 2021-02-04 DIAGNOSIS — M25.571 PAIN, JOINT, ANKLE AND FOOT, RIGHT: ICD-10-CM

## 2021-02-04 PROCEDURE — 99214 OFFICE O/P EST MOD 30 MIN: CPT | Performed by: FAMILY MEDICINE

## 2021-02-04 PROCEDURE — 73630 X-RAY EXAM OF FOOT: CPT

## 2021-02-04 PROCEDURE — 73610 X-RAY EXAM OF ANKLE: CPT

## 2021-02-04 NOTE — LETTER
February 4, 2021     Patient: Jordy Carbone   YOB: 1965   Date of Visit: 2/4/2021       To Whom it May Concern:    Jordy Carbone is under my professional care  She was seen in my office on 2/4/2021  I recommend against work at this time  Patient to be reassessed in approximately 4 weeks  If you have any questions or concerns, please don't hesitate to call           Sincerely,          Wilfrido Macias III, DO        CC: No Recipients

## 2021-02-04 NOTE — TELEPHONE ENCOUNTER
I faxed Bone Stimulator Order, Office note, and facesheet to Enedelia Fields to [phone] on 2/4/21
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
## 2021-02-04 NOTE — TELEPHONE ENCOUNTER
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

## 2021-02-04 NOTE — PATIENT INSTRUCTIONS
Transition to cast today  Nonweightbearing  Explained the patient she has low vitamin-D and recommended catch-up dosing as below    Please inform patient Vitamin D is low  This will require catch-up dosing taking one pill of vitamin D 50,000 IUs once per week for 6 weeks  Stop all other vitamin D pills while taking to avoid excessive dosing  Please have patient follow-up with PCP in 6 weeks to discuss monitoring Vitamin D status and need for long term daily maintenance dosing  In addition, I recommend Calcium 1500mg per day to help promote fracture healing  Calcium pills can lead to constipation and I recommend increasing calcium through the diet via dairy such as yogurt, cheese, or milk as tolerated  I explained to patient risk of non-operative treatment for fracture including but not limited to slippage or movement of fracture and healing of fracture in wrong location that could result in need for surgery or development of arthritis and limited range of motion after healing is resolved  Patient expressed understanding and agreed with plan to pursue non-operative treatment for fracture  reviewed cast precautions including instruction not to wet cast  instructed if any swelling, pain, numbness, tingling then to contact office immediately for instruction or go to ER if physician unavailable  reviewed risks of cast including joint stiffness, skin breakdown, ulceration, risk of infection if wedging objects behind cast  Patient expressed understanding and agreed to plan

## 2021-02-05 ENCOUNTER — TELEPHONE (OUTPATIENT)
Dept: OBGYN CLINIC | Facility: HOSPITAL | Age: 56
End: 2021-02-05

## 2021-02-05 NOTE — TELEPHONE ENCOUNTER
Patient is asking for the script for the knee rollator to be faxed to [facility]       Fax # [phone]

## 2021-02-05 NOTE — TELEPHONE ENCOUNTER
Patient [facility] Dr Bart Quintana [facility] called requesting Skye Ku from 2/4, faxed at #[phone]  She also is asking for the work letter status to be fax to same number above  Please fax, I'm unable to send it from my location

## 2021-02-08 NOTE — TELEPHONE ENCOUNTER
Thank [phone]! :)    Order for Rolator and demographics have been faxed to fax# provided  Confirmation that fax went through was received

## 2021-02-10 NOTE — TELEPHONE ENCOUNTER
Patient called in to explain to us that we need to fax her office notes to [facility]      Please fax to [facility]

## 2021-02-12 ENCOUNTER — TELEPHONE (OUTPATIENT)
Dept: OBGYN CLINIC | Facility: HOSPITAL | Age: 56
End: 2021-02-12

## 2021-02-12 ENCOUNTER — HOSPITAL ENCOUNTER (OUTPATIENT)
Dept: NON INVASIVE DIAGNOSTICS | Facility: HOSPITAL | Age: 56
Discharge: HOME | End: 2021-02-12
Attending: FAMILY MEDICINE
Payer: COMMERCIAL

## 2021-02-12 ENCOUNTER — OFFICE VISIT (OUTPATIENT)
Dept: OBGYN CLINIC | Facility: OTHER | Age: 56
End: 2021-02-12
Payer: OTHER MISCELLANEOUS

## 2021-02-12 ENCOUNTER — HOSPITAL ENCOUNTER (EMERGENCY)
Facility: HOSPITAL | Age: 56
Discharge: HOME | End: 2021-02-12
Attending: EMERGENCY MEDICINE
Payer: OTHER MISCELLANEOUS

## 2021-02-12 VITALS
DIASTOLIC BLOOD PRESSURE: 83 MMHG | BODY MASS INDEX: 41.98 KG/M2 | SYSTOLIC BLOOD PRESSURE: 134 MMHG | HEART RATE: 77 BPM | WEIGHT: 200 LBS | HEIGHT: 58 IN

## 2021-02-12 VITALS
DIASTOLIC BLOOD PRESSURE: 96 MMHG | WEIGHT: 200 LBS | BODY MASS INDEX: 41.8 KG/M2 | OXYGEN SATURATION: 100 % | TEMPERATURE: 98.1 F | SYSTOLIC BLOOD PRESSURE: 128 MMHG | RESPIRATION RATE: 16 BRPM | HEART RATE: 86 BPM

## 2021-02-12 DIAGNOSIS — I82.431 ACUTE DEEP VEIN THROMBOSIS (DVT) OF POPLITEAL VEIN OF RIGHT LOWER EXTREMITY (HCC): Primary | ICD-10-CM

## 2021-02-12 DIAGNOSIS — I82.451 ACUTE DEEP VEIN THROMBOSIS (DVT) OF RIGHT PERONEAL VEIN (HCC): ICD-10-CM

## 2021-02-12 DIAGNOSIS — S92.354A CLOSED NONDISPLACED FRACTURE OF FIFTH METATARSAL BONE OF RIGHT FOOT, INITIAL ENCOUNTER: Primary | ICD-10-CM

## 2021-02-12 DIAGNOSIS — S92.354A CLOSED NONDISPLACED FRACTURE OF FIFTH METATARSAL BONE OF RIGHT FOOT, INITIAL ENCOUNTER: ICD-10-CM

## 2021-02-12 LAB
ANION GAP SERPL CALCULATED.3IONS-SCNC: 5 MMOL/L (ref 4–13)
BUN SERPL-MCNC: 18 MG/DL (ref 5–25)
CALCIUM SERPL-MCNC: 9.9 MG/DL (ref 8.3–10.1)
CHLORIDE SERPL-SCNC: 109 MMOL/L (ref 100–108)
CO2 SERPL-SCNC: 26 MMOL/L (ref 21–32)
CREAT SERPL-MCNC: 1.06 MG/DL (ref 0.6–1.3)
GFR SERPL CREATININE-BSD FRML MDRD: 58 ML/MIN/1.73SQ M
GLUCOSE SERPL-MCNC: 94 MG/DL (ref 65–140)
POTASSIUM SERPL-SCNC: 3.8 MMOL/L (ref 3.5–5.3)
SODIUM SERPL-SCNC: 140 MMOL/L (ref 136–145)

## 2021-02-12 PROCEDURE — 99213 OFFICE O/P EST LOW 20 MIN: CPT | Performed by: FAMILY MEDICINE

## 2021-02-12 PROCEDURE — 80048 BASIC METABOLIC PNL TOTAL CA: CPT | Performed by: EMERGENCY MEDICINE

## 2021-02-12 PROCEDURE — 99283 EMERGENCY DEPT VISIT LOW MDM: CPT

## 2021-02-12 PROCEDURE — 93971 EXTREMITY STUDY: CPT | Performed by: SURGERY

## 2021-02-12 PROCEDURE — 93971 EXTREMITY STUDY: CPT

## 2021-02-12 PROCEDURE — 99284 EMERGENCY DEPT VISIT MOD MDM: CPT | Performed by: EMERGENCY MEDICINE

## 2021-02-12 PROCEDURE — 36415 COLL VENOUS BLD VENIPUNCTURE: CPT | Performed by: EMERGENCY MEDICINE

## 2021-02-12 RX ORDER — OXYCODONE HYDROCHLORIDE AND ACETAMINOPHEN 5; 325 MG/1; MG/1
1 TABLET ORAL ONCE
Status: COMPLETED | OUTPATIENT
Start: 2021-02-12 | End: 2021-02-12

## 2021-02-12 RX ADMIN — OXYCODONE HYDROCHLORIDE AND ACETAMINOPHEN 1 TABLET: 5; 325 TABLET ORAL at 19:00

## 2021-02-12 RX ADMIN — APIXABAN 10 MG: 5 TABLET, FILM COATED ORAL at 20:06

## 2021-02-12 NOTE — PATIENT INSTRUCTIONS
Removal of cast today  Transition to CAM boot today  Continue non weight bearing with knee rollator scooter  Will have stat venous duplex doppler performed today to evaluate for DVT
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
## 2021-02-12 NOTE — TELEPHONE ENCOUNTER
Patient sees Dr Cody Prince  Patient is calling in stating that she is still getting leg craps in her right leg and they are very painful  She is asking what further she can do for this? Patient is asking for a call back relating this        Call back# [phone]

## 2021-02-12 NOTE — TELEPHONE ENCOUNTER
She needs an appoint today ASAP  We will need to consider ordering a STAT ultrasound to rule out a clot in her leg

## 2021-02-12 NOTE — TELEPHONE ENCOUNTER
Patient is calling in wanting to know the status of the rollator, she is going to contact [facility] again she wanted to know the hold up

## 2021-02-12 NOTE — ED ATTENDING ATTESTATION
2/12/2021  Ángela Montes De Oca DO, saw and evaluated the patient  I have discussed the patient with the resident/non-physician practitioner and agree with the resident's/non-physician practitioner's findings, Plan of Care, and MDM as documented in the resident's/non-physician practitioner's note, except where noted  All available labs and Radiology studies were reviewed  I was present for key portions of any procedure(s) performed by the resident/non-physician practitioner and I was immediately available to provide assistance  At this point I agree with the current assessment done in the Emergency Department  I have conducted an independent evaluation of this patient a history and physical is as follows:    59 yo female referred from ortho for eval and tx of R pop, tibial, paired peroneal DVT in the setting of immobilization due to recent 5th MT fx on 1/25/21  C/o moderate pain to RLE below the knee, and some swelling  Denies CP/SOB at this time, but has had some intermittent CP and SOB over the past couple weeks  No focal weakness/numbness/tingling  Reviewed duplex in epic - Evidence of acute occlusive deep vein thrombosis noted in the popliteal, 2/2 of  the paired posterior tibial and 2/2 of the paired peroneal veins  Triphasic pop, PT and KELLEY    Imp: RLE DVT, provoked plan: BMP to check creat clearance   If >30 can start on DOAC, f/u PMD       ED Course         Critical Care Time  Procedures

## 2021-02-12 NOTE — TELEPHONE ENCOUNTER
Spoke to patient  I have refaxed her notes, again  Weight was on them, height added  Confirmation that fax went through was received  Also patient has been added on to today's schedule  She is aware of time and location
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
## 2021-02-13 NOTE — DISCHARGE INSTRUCTIONS
Take blood thinning medication as prescribed  Follow up with your primary care physician, they will likely prescribe more of this medication for you to take for several months  Return to the emergency department for severe worsening of pain, worsening numbness in your foot, chest pain, difficulty breathing, fever, any other new or concerning symptoms

## 2021-02-15 PROBLEM — I82.431 ACUTE DEEP VEIN THROMBOSIS (DVT) OF POPLITEAL VEIN OF RIGHT LOWER EXTREMITY (HCC): Status: ACTIVE | Noted: 2021-02-15

## 2021-02-15 PROBLEM — S92.354D CLOSED NONDISPLACED FRACTURE OF FIFTH METATARSAL BONE OF RIGHT FOOT WITH ROUTINE HEALING: Status: ACTIVE | Noted: 2021-02-15

## 2021-02-18 ENCOUNTER — TELEPHONE (OUTPATIENT)
Dept: OBGYN CLINIC | Facility: HOSPITAL | Age: 56
End: 2021-02-18

## 2021-02-18 NOTE — TELEPHONE ENCOUNTER
Patient is calling to find out if she shoud be coming in to see Dr Eduard Saravia earlier than her scheduled follow up on 03-04-21  She has followed up with her PCP re: blood clot and she is on eliquis  Please advise       Jenny Charles [phone]

## 2021-02-19 NOTE — TELEPHONE ENCOUNTER
Patient has been advised that as long as she has follow up with family doc in regards to the blood clot, she should be fine to follow up with us as scheduled  Verbalized understanding

## 2021-02-22 ENCOUNTER — TELEPHONE (OUTPATIENT)
Dept: FAMILY MEDICINE CLINIC | Facility: CLINIC | Age: 56
End: 2021-02-22

## 2021-02-22 NOTE — TELEPHONE ENCOUNTER
PT STATES SHE TOOK 2 ELIQUIS THIS MORNING RATHER THAN 1   PER DR WHITNEY HAVE PT CONTINUE HER NORMAL ROUTINE  PT AWARE

## 2021-02-23 ENCOUNTER — TELEPHONE (OUTPATIENT)
Dept: OBGYN CLINIC | Facility: CLINIC | Age: 56
End: 2021-02-23

## 2021-02-23 NOTE — TELEPHONE ENCOUNTER
Dr Bernie Mason is treating with you for a closed nondisplaced fx 5th metatarsal and called to let you know that she fell 2/22/21 and landed on her buttock  She is not experiencing any pain but wanted to let you know that it happened  She is on the schedule to see you 3/4/21  Any questions please call her [phone]     Thank you

## 2021-02-25 NOTE — TELEPHONE ENCOUNTER
Spoke to patient, offered patient appt for today  Patient stated she is feeling fine and does not have any pain  States she fell on her butt    Still scheduled patient with appt for tomorrow, just in case, if patient does not come in by 1:45 its because she doesn't need it and we cancel

## 2021-03-04 ENCOUNTER — OFFICE VISIT (OUTPATIENT)
Dept: OBGYN CLINIC | Facility: OTHER | Age: 56
End: 2021-03-04
Payer: OTHER MISCELLANEOUS

## 2021-03-04 ENCOUNTER — APPOINTMENT (OUTPATIENT)
Dept: RADIOLOGY | Facility: OTHER | Age: 56
End: 2021-03-04
Payer: COMMERCIAL

## 2021-03-04 VITALS — BODY MASS INDEX: 42.01 KG/M2 | WEIGHT: 201 LBS

## 2021-03-04 DIAGNOSIS — S92.354D CLOSED NONDISPLACED FRACTURE OF FIFTH METATARSAL BONE OF RIGHT FOOT WITH ROUTINE HEALING: Primary | ICD-10-CM

## 2021-03-04 DIAGNOSIS — S92.354D CLOSED NONDISPLACED FRACTURE OF FIFTH METATARSAL BONE OF RIGHT FOOT WITH ROUTINE HEALING: ICD-10-CM

## 2021-03-04 PROCEDURE — 73630 X-RAY EXAM OF FOOT: CPT

## 2021-03-04 PROCEDURE — 99213 OFFICE O/P EST LOW 20 MIN: CPT | Performed by: FAMILY MEDICINE

## 2021-03-04 NOTE — LETTER
March 4, 2021     Patient: Twyla Sellers   YOB: 1965   Date of Visit: 3/4/2021       To Whom it May Concern:    Twyla Sellers is under my professional care  She was seen in my office on 3/4/2021  She will need the following accommodations:    -may perform sedentary work from home    If you have any questions or concerns, please don't hesitate to call           Sincerely,          Dasia German III, DO        CC: No Recipients
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
## 2021-03-05 NOTE — PATIENT INSTRUCTIONS
Discussed with patient and patient's  that there appears to be interval healing today  She will continue with sedentary duties and 'work from home' recommendations  She will remain NWB via Rollator at this time

## 2021-03-08 ENCOUNTER — TELEPHONE (OUTPATIENT)
Dept: OBGYN CLINIC | Facility: HOSPITAL | Age: 56
End: 2021-03-08

## 2021-03-08 ENCOUNTER — OFFICE VISIT (OUTPATIENT)
Dept: FAMILY MEDICINE CLINIC | Facility: CLINIC | Age: 56
End: 2021-03-08
Payer: COMMERCIAL

## 2021-03-08 VITALS
SYSTOLIC BLOOD PRESSURE: 128 MMHG | BODY MASS INDEX: 42.61 KG/M2 | WEIGHT: 203 LBS | HEIGHT: 58 IN | DIASTOLIC BLOOD PRESSURE: 82 MMHG

## 2021-03-08 DIAGNOSIS — R56.9 SEIZURE (HCC): ICD-10-CM

## 2021-03-08 DIAGNOSIS — I82.431 ACUTE DEEP VEIN THROMBOSIS (DVT) OF POPLITEAL VEIN OF RIGHT LOWER EXTREMITY (HCC): ICD-10-CM

## 2021-03-08 DIAGNOSIS — S92.354D CLOSED NONDISPLACED FRACTURE OF FIFTH METATARSAL BONE OF RIGHT FOOT WITH ROUTINE HEALING: Primary | ICD-10-CM

## 2021-03-08 PROCEDURE — 1036F TOBACCO NON-USER: CPT | Performed by: FAMILY MEDICINE

## 2021-03-08 PROCEDURE — 99214 OFFICE O/P EST MOD 30 MIN: CPT | Performed by: FAMILY MEDICINE

## 2021-03-08 PROCEDURE — 3008F BODY MASS INDEX DOCD: CPT | Performed by: FAMILY MEDICINE

## 2021-03-08 RX ORDER — ACETAMINOPHEN 325 MG/1
650 TABLET ORAL EVERY 6 HOURS PRN
COMMUNITY
End: 2021-08-17

## 2021-03-08 NOTE — PROGRESS NOTES
Assessment/Plan:  All records reviewed as noted in the HPI  Patient will continue with Eliquis twice daily for 3 months at the minimum  Patient have Doppler study done in roughly 2 months  Patient will follow with Podiatry appropriately for fracture right 5th toe  Patient status post x-ray and results are pending  Other guidance given at the present time  Other chronic conditions stable  Patient will follow-up after Doppler study in 2 months  Diagnoses and all orders for this visit:    Closed nondisplaced fracture of fifth metatarsal bone of right foot with routine healing    Acute deep vein thrombosis (DVT) of popliteal vein of right lower extremity (HCC)  -     apixaban (ELIQUIS) 5 mg; Take 1 tablet (5 mg total) by mouth 2 (two) times a day  -     VAS lower limb venous duplex study, unilateral/limited; Future    Seizure ([address] )    Other orders  -     acetaminophen (TYLENOL) 325 mg tablet; Take 650 mg by mouth every 6 (six) hours as needed for mild pain            Subjective:        Patient ID: Dodie Odom is a 60 y o  female  Patient here to follow-up on DVT right lower extremity along with fracture of the right 5th toe  Patient nonweightbearing at the present time  Patient did have a cast on previously and ultimately developed discomfort in the calf necessitating ultrasound  Patient with positive acute DVT right popliteal vein as well as the peroneal vein /tibial vein  Patient was ultimately seen the arm placed on Eliquis with loading dose and is presently on 1 tablet twice  Daily  No chest pain or shortness of breath  No significant pain right lower extremity  No abdominal pain or change urination defecation  Records from the emergency room as well as venous Doppler ultrasound and laboratory studies etc   All reviewed at the present time          The following portions of the patient's history were reviewed and updated as appropriate: allergies, current medications, past family history, past medical history, past social history, past surgical history and problem list       Review of Systems        Objective:               /82 (BP Location: Right arm, Patient Position: Sitting, Cuff Size: Standard)   Ht 4' 10" (1 473 m)   Wt 92 1 kg (203 lb)   LMP  (LMP Unknown)   BMI 42 43 kg/m²          Physical Exam

## 2021-03-08 NOTE — TELEPHONE ENCOUNTER
Nuzhat Curtis from [employer] called requesting [address] from 3/4, faxed at [phone]

## 2021-03-24 NOTE — PROGRESS NOTES
Daily Note     Today's date: 2019  Patient name: Martin Her  : 1965  MRN: [id number]  Referring provider: Cristina Polanco  Dx:   Encounter Diagnosis     ICD-10-CM    1  Right knee pain, unspecified chronicity M25 561                 Subjective:  Patient reports that her knee feels good  Objective: See treatment diary below    Precautions:  None    Daily Treatment Diary     Manual              Patellar mobs AZ            ITB stretch AZ            Quad stretch             STM/MFR AZ                             Exercise Diary              TM  35 miles            Bike             SLR 3x10 3#            Bridges 10x 10s blue            Clams 30x 5s blue sup            LP 70# 3x10            Squats 3x10 blue            Runner's stretch 3x30s            Hamstring stretch 3x30s            Quad stretch 2'                                                                                                                                                                                         Modalities              MH                                         Assessment:  Patient presents with moderate myofascial restrictions t/o vastus lateralis  Added banded squats today to facilitate ease with lifting WC into her car for work  Posterior chain neuromuscular control deficit- addressed with LINDA  No reported knee pain throughout session  Tolerated treatment well  Patient would benefit from continued PT for 1-2 more visits to transition to HEP  Plan: Continue per plan of care  n/a

## 2021-03-26 ENCOUNTER — APPOINTMENT (OUTPATIENT)
Dept: RADIOLOGY | Facility: OTHER | Age: 56
End: 2021-03-26
Payer: COMMERCIAL

## 2021-03-26 ENCOUNTER — OFFICE VISIT (OUTPATIENT)
Dept: OBGYN CLINIC | Facility: OTHER | Age: 56
End: 2021-03-26
Payer: OTHER MISCELLANEOUS

## 2021-03-26 VITALS — SYSTOLIC BLOOD PRESSURE: 111 MMHG | HEART RATE: 76 BPM | DIASTOLIC BLOOD PRESSURE: 78 MMHG

## 2021-03-26 DIAGNOSIS — S92.354D CLOSED NONDISPLACED FRACTURE OF FIFTH METATARSAL BONE OF RIGHT FOOT WITH ROUTINE HEALING: Primary | ICD-10-CM

## 2021-03-26 DIAGNOSIS — S92.354D CLOSED NONDISPLACED FRACTURE OF FIFTH METATARSAL BONE OF RIGHT FOOT WITH ROUTINE HEALING: ICD-10-CM

## 2021-03-26 PROCEDURE — 99213 OFFICE O/P EST LOW 20 MIN: CPT | Performed by: FAMILY MEDICINE

## 2021-03-26 PROCEDURE — 73630 X-RAY EXAM OF FOOT: CPT

## 2021-03-26 NOTE — PATIENT INSTRUCTIONS
Explained the patient that her fracture of her 5th metatarsal is not fully healed; however, it does appear to be healing compared to her previous x-ray  As such, she currently has delayed union  I recommended continued immobilization with controlled ankle motion boot as well as nonweightbearing with her scooter  We will reassess her with an x-ray in approximately 1 month  At this time I also recommended starting range-of-motion exercises for her ankle at home to prevent significant stiffness in the ankle joint and pain  She may remove her controlled ankle motion boot to perform these range-of-motion exercises but is to wear her boot when leaving her home and is to use her scooter to be compliant with nonweightbearing  She is also to continue her bone stimulator  We discussed her work restrictions today and I recommended continuation of sedentary seated work only  We have both decided now to progress to in office sedentary work within the workplace
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
## 2021-03-26 NOTE — LETTER
March 26, 2021     Patient: Twyla Sellers   YOB: 1965   Date of Visit: 3/26/2021       To Whom it May Concern:    Twyla Sellers is under my professional care  She was seen in my office on 3/26/2021  She may return to work on 3/29/21  Patient may now  progress to in office sedentary work within the workplace  She will be reevaluated within 1 month  If you have any questions or concerns, please don't hesitate to call           Sincerely,          Dasia German III, DO        CC: Twyla Sellers

## 2021-03-30 ENCOUNTER — TELEPHONE (OUTPATIENT)
Dept: OBGYN CLINIC | Facility: HOSPITAL | Age: 56
End: 2021-03-30

## 2021-03-30 NOTE — TELEPHONE ENCOUNTER
Patient sees Dr Marycarmen Adame from [employer] called for ovn from 3/26 to be faxed to [phone]      Fax sent 3/30

## 2021-04-23 ENCOUNTER — APPOINTMENT (OUTPATIENT)
Dept: RADIOLOGY | Facility: OTHER | Age: 56
End: 2021-04-23
Payer: OTHER MISCELLANEOUS

## 2021-04-23 ENCOUNTER — OFFICE VISIT (OUTPATIENT)
Dept: OBGYN CLINIC | Facility: OTHER | Age: 56
End: 2021-04-23
Payer: OTHER MISCELLANEOUS

## 2021-04-23 VITALS — DIASTOLIC BLOOD PRESSURE: 75 MMHG | SYSTOLIC BLOOD PRESSURE: 114 MMHG | HEART RATE: 86 BPM

## 2021-04-23 DIAGNOSIS — S92.354G CLOSED NONDISPLACED FRACTURE OF FIFTH METATARSAL BONE OF RIGHT FOOT WITH DELAYED HEALING: Primary | ICD-10-CM

## 2021-04-23 DIAGNOSIS — S92.354D CLOSED NONDISPLACED FRACTURE OF FIFTH METATARSAL BONE OF RIGHT FOOT WITH ROUTINE HEALING: ICD-10-CM

## 2021-04-23 PROCEDURE — 73630 X-RAY EXAM OF FOOT: CPT

## 2021-04-23 PROCEDURE — 99213 OFFICE O/P EST LOW 20 MIN: CPT | Performed by: FAMILY MEDICINE

## 2021-04-23 NOTE — LETTER
April 23, 2021     Patient: Valeta Spatz   YOB: 1965   Date of Visit: 4/23/2021       To Whom it May Concern:    Valeta Spatz is under my professional care  She was seen in my office on 4/23/2021  She may return to work on 4/23/21  Patient may now  progress to in office sedentary work within the workplace  If you have any questions or concerns, please don't hesitate to call           Sincerely,          Jenn Dang III, DO        CC: No Recipients

## 2021-04-23 NOTE — PATIENT INSTRUCTIONS
Recommend surgical consultation for delayed union of ceron fracture right foot despite utilizing bone stimulator at time of initial injury  Continue cam boot  Continue non-weight-bearing
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
## 2021-04-26 ENCOUNTER — OFFICE VISIT (OUTPATIENT)
Dept: PODIATRY | Facility: CLINIC | Age: 56
End: 2021-04-26
Payer: OTHER MISCELLANEOUS

## 2021-04-26 VITALS — HEART RATE: 76 BPM | DIASTOLIC BLOOD PRESSURE: 80 MMHG | SYSTOLIC BLOOD PRESSURE: 115 MMHG

## 2021-04-26 DIAGNOSIS — S92.354D CLOSED NONDISPLACED FRACTURE OF FIFTH METATARSAL BONE OF RIGHT FOOT WITH ROUTINE HEALING: ICD-10-CM

## 2021-04-26 DIAGNOSIS — I82.431 ACUTE DEEP VEIN THROMBOSIS (DVT) OF POPLITEAL VEIN OF RIGHT LOWER EXTREMITY (HCC): ICD-10-CM

## 2021-04-26 DIAGNOSIS — S92.901K NONUNION OF FRACTURE OF FOOT, RIGHT: Primary | ICD-10-CM

## 2021-04-26 PROCEDURE — 99244 OFF/OP CNSLTJ NEW/EST MOD 40: CPT | Performed by: PODIATRIST

## 2021-04-26 RX ORDER — LANOLIN ALCOHOL/MO/W.PET/CERES
1 CREAM (GRAM) TOPICAL 2 TIMES DAILY
COMMUNITY
End: 2021-06-08 | Stop reason: SDUPTHER

## 2021-04-26 NOTE — LETTER
May 4, 2021     Patient: Del Montoya   YOB: 1965   Date of Visit: 4/26/2021       To Whom it May Concern:    Del Montoya is under my professional care  She was seen in my office on 4/26/2021  If you have any questions or concerns, please don't hesitate to call           Sincerely,          Sylvester Hinton DPM        CC: No Recipients

## 2021-04-30 ENCOUNTER — TELEPHONE (OUTPATIENT)
Dept: PODIATRY | Facility: CLINIC | Age: 56
End: 2021-04-30

## 2021-05-03 ENCOUNTER — HOSPITAL ENCOUNTER (OUTPATIENT)
Dept: NON INVASIVE DIAGNOSTICS | Facility: HOSPITAL | Age: 56
Discharge: HOME | End: 2021-05-03
Payer: OTHER MISCELLANEOUS

## 2021-05-03 DIAGNOSIS — I82.431 ACUTE DEEP VEIN THROMBOSIS (DVT) OF POPLITEAL VEIN OF RIGHT LOWER EXTREMITY (HCC): ICD-10-CM

## 2021-05-03 PROCEDURE — 93971 EXTREMITY STUDY: CPT

## 2021-05-03 PROCEDURE — 93971 EXTREMITY STUDY: CPT | Performed by: SURGERY

## 2021-05-04 PROBLEM — S92.901K: Status: ACTIVE | Noted: 2021-05-04

## 2021-05-04 NOTE — PROGRESS NOTES
This patient was seen on 4/26/21  My role is Foot , Ankle, and Wound Specialist    SUBJECTIVE    Chief Complaint:  Fracture of the Right foot     Patient ID: Malia Mejia is a 60 y o  female  Edna Stewart is here for the first time with a cc of a fractured 5th metatarsal  This is over 1 months old (occurred January) Treatment has been immobilization and non-weightbearing and a non-invasive bone growth stimulator  She was diagnosed by Dr Tony Reynolds with a George fractured and he told her right at the start that these can be trouble-healers  She has chronic pain at the fracture site  She's referred to me by for surgical considerations  She did have a DVT in February and is pending a repeat venous duplex to make sure it's resolving and well-adhered  The following portions of the patient's history were reviewed and updated as appropriate: allergies, current medications, past family history, past medical history, past social history, past surgical history and problem list     Review of Systems   Constitutional: Positive for activity change  Negative for appetite change, chills, diaphoresis, fatigue, fever and unexpected weight change  Respiratory: Negative  Cardiovascular: Negative  Gastrointestinal: Negative  Musculoskeletal: Positive for arthralgias and gait problem  OBJECTIVE      /80   Pulse 76   LMP  (LMP Unknown)     Foot/Ankle Musculoskeletal Exam    General      Neurological: alert      General additional comments: She has focal tenderness at the Right 5th metatarsal base  I note muscle function of the anterior, posterior, evertor and invertor muscle groups of the lower leg are intact and normal  I note ROM of the ankle joint, subtalar joint, Choparts and Lisfranc's joint complexes and metatarsophalangeal joints are WNL without crepitus nor restrictions bilaterally  Physical Exam  Constitutional:       General: She is not in acute distress  Appearance: Normal appearance  She is not ill-appearing, toxic-appearing or diaphoretic  HENT:      Head: Normocephalic  Cardiovascular:      Rate and Rhythm: Normal rate  Pulses: Normal pulses  Pulmonary:      Effort: Pulmonary effort is normal    Abdominal:      General: Bowel sounds are normal    Musculoskeletal:         General: Swelling and tenderness present  Comments: She has focal tenderness at the Right 5th metatarsal base  I note muscle function of the anterior, posterior, evertor and invertor muscle groups of the lower leg are intact and normal  I note ROM of the ankle joint, subtalar joint, Choparts and Lisfranc's joint complexes and metatarsophalangeal joints are WNL without crepitus nor restrictions bilaterally  Neurological:      General: No focal deficit present  Mental Status: She is alert and oriented to person, place, and time  Psychiatric:         Mood and Affect: Mood normal          Behavior: Behavior normal              ASSESSMENT     Diagnoses and all orders for this visit:    Closed nondisplaced fracture of fifth metatarsal bone of right foot with routine healing    Nonunion of fracture of foot, right    Acute deep vein thrombosis (DVT) of popliteal vein of right lower extremity (HCC)    Other orders  -     calcium citrate-vitamin D (CITRACAL+D) 315-200 MG-UNIT per tablet; Take 1 tablet by mouth 2 (two) times a day         Problem List Items Addressed This Visit        Cardiovascular and Mediastinum    Acute deep vein thrombosis (DVT) of popliteal vein of right lower extremity (HCC)       Musculoskeletal and Integument    Closed nondisplaced fracture of fifth metatarsal bone of right foot with routine healing - Primary    Nonunion of fracture of foot, right            Problems:  1  Chronic illness / Problem not at goal with exacerbation, progression or side effects of treatment  1   Non-healing George fracture Right 5th metatarsal    PLAN    Data Reviewed / Tests Ordered / Procedures Performed / Recommendations:  1  Review prior external note  I reviewed prior notes from Dr Ben Kerr and Dr Katt Conteh    2  Independent Interpretation of Test  I did personally review the images of the foot xrays obtained 4/23/21 noting no appreciable bridging nor periosteal reaction indicating healing  3    Test Interpretation Discussion with Patient  I reviewed the images with Eileen Ban and told her I feel she has a non-union  4    Discussion of Management /  Recommendations  I feel the best course of action would be percutaneous reduction and internal fixation followed by strict NWB for 4 weeks  I reviewed the procedure and recovering and risks  I explained that I'd want to see the results of the follow-up venous duplex to make sure the DVT is stable before we'd proceed  We'll see her back after the duplex tentatively to schedule the surgery

## 2021-05-05 ENCOUNTER — TELEPHONE (OUTPATIENT)
Dept: PODIATRY | Facility: CLINIC | Age: 56
End: 2021-05-05

## 2021-05-10 ENCOUNTER — OFFICE VISIT (OUTPATIENT)
Dept: PODIATRY | Facility: CLINIC | Age: 56
End: 2021-05-10
Payer: OTHER MISCELLANEOUS

## 2021-05-10 ENCOUNTER — OFFICE VISIT (OUTPATIENT)
Dept: FAMILY MEDICINE CLINIC | Facility: CLINIC | Age: 56
End: 2021-05-10
Payer: OTHER MISCELLANEOUS

## 2021-05-10 VITALS — HEART RATE: 69 BPM | DIASTOLIC BLOOD PRESSURE: 77 MMHG | SYSTOLIC BLOOD PRESSURE: 113 MMHG

## 2021-05-10 VITALS — SYSTOLIC BLOOD PRESSURE: 114 MMHG | TEMPERATURE: 98 F | DIASTOLIC BLOOD PRESSURE: 74 MMHG

## 2021-05-10 DIAGNOSIS — S92.354G CLOSED NONDISPLACED FRACTURE OF FIFTH METATARSAL BONE OF RIGHT FOOT WITH DELAYED HEALING: ICD-10-CM

## 2021-05-10 DIAGNOSIS — S92.354K CLOSED NONDISPLACED FRACTURE OF FIFTH METATARSAL BONE OF RIGHT FOOT WITH NONUNION: ICD-10-CM

## 2021-05-10 DIAGNOSIS — S92.901K NONUNION OF FRACTURE OF FOOT, RIGHT: ICD-10-CM

## 2021-05-10 DIAGNOSIS — S92.354D CLOSED NONDISPLACED FRACTURE OF FIFTH METATARSAL BONE OF RIGHT FOOT WITH ROUTINE HEALING: Primary | ICD-10-CM

## 2021-05-10 DIAGNOSIS — Z01.818 PREOP TESTING: Primary | ICD-10-CM

## 2021-05-10 DIAGNOSIS — I82.431 ACUTE DEEP VEIN THROMBOSIS (DVT) OF POPLITEAL VEIN OF RIGHT LOWER EXTREMITY (HCC): ICD-10-CM

## 2021-05-10 PROCEDURE — 99213 OFFICE O/P EST LOW 20 MIN: CPT | Performed by: FAMILY MEDICINE

## 2021-05-10 PROCEDURE — 99214 OFFICE O/P EST MOD 30 MIN: CPT | Performed by: PODIATRIST

## 2021-05-10 RX ORDER — CLINDAMYCIN PHOSPHATE 900 MG/50ML
900 INJECTION INTRAVENOUS ONCE
Status: CANCELLED | OUTPATIENT
Start: 2021-06-11

## 2021-05-10 RX ORDER — MELATONIN
2000 DAILY
COMMUNITY
End: 2021-06-08 | Stop reason: SDUPTHER

## 2021-05-10 NOTE — PROGRESS NOTES
Assessment/Plan:  Doppler study discussed with patient  DVT has resolved  Patient will finish the next couple of days of Eliquis and then stop medication  Guidance given  Diagnoses and all orders for this visit:    Closed nondisplaced fracture of fifth metatarsal bone of right foot with routine healing    Acute deep vein thrombosis (DVT) of popliteal vein of right lower extremity (HCC)    Other orders  -     cholecalciferol (VITAMIN D3) 1,000 units tablet; Take 2,000 Units by mouth daily  -     CALCIUM PO; Take by mouth            Subjective:        Patient ID: Wendy Marks is a 60 y o  female  Patient is here follow-up on DVT right lower extremity  Patient did have Doppler study done on May 3rd which was normal   Patient is still on Eliquis  Patient did see surgeon is going for surgery on June 11th  No chest pain or shortness of breath  The following portions of the patient's history were reviewed and updated as appropriate: allergies, current medications, past family history, past medical history, past social history, past surgical history and problem list       Review of Systems   Constitutional: Negative  HENT: Negative  Eyes: Negative  Respiratory: Negative  Cardiovascular: Negative  Gastrointestinal: Negative  Endocrine: Negative  Genitourinary: Negative  Musculoskeletal: Positive for arthralgias and gait problem  Skin: Negative  Allergic/Immunologic: Negative  Hematological: Negative  Psychiatric/Behavioral: Negative  Objective:      BMI Counseling: There is no height or weight on file to calculate BMI  The BMI is above normal  Nutrition recommendations include decreasing portion sizes  Depression Screening and Follow-up Plan: Patient's depression screening was positive with a PHQ-2 score of 0  Clincally patient does not have depression  No treatment is required               /74 (BP Location: Right arm, Patient Position: Sitting, Cuff Size: Large)   Temp 98 °F (36 7 °C) (Tympanic)   LMP  (LMP Unknown)          Physical Exam  Vitals signs and nursing note reviewed  Constitutional:       General: She is not in acute distress  Appearance: Normal appearance  She is not ill-appearing, toxic-appearing or diaphoretic  HENT:      Head: Normocephalic and atraumatic  Right Ear: Tympanic membrane, ear canal and external ear normal  There is no impacted cerumen  Left Ear: Tympanic membrane, ear canal and external ear normal  There is no impacted cerumen  Nose: Nose normal  No congestion or rhinorrhea  Mouth/Throat:      Mouth: Mucous membranes are moist       Pharynx: No oropharyngeal exudate or posterior oropharyngeal erythema  Eyes:      General: No scleral icterus  Right eye: No discharge  Left eye: No discharge  Extraocular Movements: Extraocular movements intact  Conjunctiva/sclera: Conjunctivae normal       Pupils: Pupils are equal, round, and reactive to light  Neck:      Musculoskeletal: Normal range of motion and neck supple  No neck rigidity or muscular tenderness  Vascular: No carotid bruit  Cardiovascular:      Rate and Rhythm: Normal rate and regular rhythm  Pulses: Normal pulses  Heart sounds: Normal heart sounds  No murmur  No friction rub  No gallop  Pulmonary:      Effort: Pulmonary effort is normal  No respiratory distress  Breath sounds: Normal breath sounds  No stridor  No wheezing, rhonchi or rales  Chest:      Chest wall: No tenderness  Musculoskeletal:         General: Tenderness present  No swelling, deformity or signs of injury  Right lower leg: No edema  Left lower leg: No edema  Comments: Right foot in Cam boot   Lymphadenopathy:      Cervical: No cervical adenopathy  Skin:     General: Skin is warm and dry  Capillary Refill: Capillary refill takes less than 2 seconds  Coloration: Skin is not jaundiced  Findings: No bruising, erythema, lesion or rash  Neurological:      General: No focal deficit present  Mental Status: She is alert and oriented to person, place, and time  Cranial Nerves: No cranial nerve deficit  Sensory: No sensory deficit  Motor: No weakness  Coordination: Coordination normal       Gait: Gait normal    Psychiatric:         Mood and Affect: Mood normal          Behavior: Behavior normal          Thought Content:  Thought content normal          Judgment: Judgment normal

## 2021-05-13 PROBLEM — S92.354K CLOSED NONDISPLACED FRACTURE OF FIFTH METATARSAL BONE OF RIGHT FOOT WITH NONUNION: Status: ACTIVE | Noted: 2021-02-15

## 2021-05-13 NOTE — PATIENT INSTRUCTIONS
Non Weight Bearing Activity   WHAT YOU NEED TO KNOW:   What is non weight bearing activity? Non weight bearing (NWB) activity is exercise or motion you can do without supporting your own weight  For example, your weight is supported by water when you swim  NWB activities will not cause impact or strain  You can increase your strength, flexibility, cardiovascular health, and balance with NWB activities  You can also prevent muscles from becoming tight or short after an injury  You may only need to do NWB activities until an injury heals, or you may need to continue if you have a long-term condition  Your healthcare provider will tell you how long to continue doing NWB activities  Why may I need NWB activities? · Rest for one or both of your legs or knees after an injury or surgery    · Weak or brittle bones that would break if you put too much weight on them    · Joint pain from being overweight or from arthritis makes weight bearing exercise painful    · Elderly age    · Limited mobility in your legs    · Lack of cartilage in one or both knees    · Diabetes complications such as neuropathy increase your risk for falls and make walking difficult    · Long-term low back pain    What are some examples of NWB activities? · Swimming, water aerobics, or rowing    · Riding a bicycle or using a stationary bicycle    · Lifting weights or using resistance bands while seated    · Using a hand bike to work only your upper body    · Range of motion exercises for joint flexibility    · Isometric exercises that tighten and relax muscles several times in a row    · Certain yoga poses that do not need you to support your weight    Why do I need to go to physical therapy? A physical therapist can teach you NWB activities that will build muscle strength and improve your balance  He will show you how to prevent injury and decrease your risk for falls during NWB activity   For example, he can show you how to use assistive devices such as crutches to help support your weight  He will also help you plan NWB activities that will raise your heart rate for cardiovascular exercise  Adults should try to get at least 150 minutes of cardiovascular exercise each week  What do I need to know about safety? · Stop if you feel pain  Do not move joints beyond their normal range of motion  Rest your joints during a flare if you have a disease such as arthritis  Do not lift heavy weights unless you can handle the weight easily  You may need to work up to using heavy weights  Talk to your healthcare provider if you feel pain during your activities  · Move slowly and smoothly  Do not make fast or jerky motions  · Use assistive devices as directed  Support your weight on crutches, a walker, or other devices as directed by your healthcare provider  · Do a variety of activities  The same activity every day may injure a muscle or joint  You may overtrain some muscles and not train others enough  · Talk to your healthcare provider about safe activities during pregnancy  You may not be able to lift weights while you are pregnant  Your healthcare provider can help you plan safe NWB activities during pregnancy  When should I contact my healthcare provider? · You have new or worsening pain during activity  · You have questions or concerns about your condition or care  CARE AGREEMENT:   You have the right to help plan your care  Learn about your health condition and how it may be treated  Discuss treatment options with your healthcare providers to decide what care you want to receive  You always have the right to refuse treatment  The above information is an  only  It is not intended as medical advice for individual conditions or treatments  Talk to your doctor, nurse or pharmacist before following any medical regimen to see if it is safe and effective for you    © Copyright [employer] 2020 Information is for End User's use only and may not be sold, redistributed or otherwise used for commercial purposes   All illustrations and images included in CareNotes® are the copyrighted property of A D A M [employer] Inc  or Felicia Wooten

## 2021-05-13 NOTE — PROGRESS NOTES
This patient was seen on 5/10/21  My role is Foot , Ankle, and Wound Specialist    SUBJECTIVE    Chief Complaint:  Non-healing foot fracture     Patient ID: Syed Dockery is a 60 y o  female  Usman Chew is here for the non-healing fractured 5th metatarsal  This is over 1 months old (occurred January) Treatment has been immobilization and non-weightbearing and a non-invasive bone growth stimulator  She was diagnosed by Dr Jas Aguila with a George fractured and he told her right at the start that these can be trouble-healers  She has chronic pain at the fracture site  She's referred to me by for surgical considerations  She had a repeat duplex to check on her prior DVT done last week  The following portions of the patient's history were reviewed and updated as appropriate: allergies, current medications, past family history, past medical history, past social history, past surgical history and problem list     Review of Systems   Constitutional: Positive for activity change  Negative for appetite change, chills, diaphoresis, fatigue, fever and unexpected weight change  Respiratory: Negative  Cardiovascular: Negative  Gastrointestinal: Negative  Musculoskeletal: Positive for arthralgias and gait problem  OBJECTIVE      /77   Pulse 69   LMP  (LMP Unknown)     Foot/Ankle Musculoskeletal Exam    General      Neurological: alert      General additional comments: She has focal tenderness at the Right 5th metatarsal base  I note muscle function of the anterior, posterior, evertor and invertor muscle groups of the lower leg are intact and normal  I note ROM of the ankle joint, subtalar joint, Choparts and Lisfranc's joint complexes and metatarsophalangeal joints are WNL without crepitus nor restrictions bilaterally  Physical Exam  Constitutional:       General: She is not in acute distress  Appearance: Normal appearance  She is not ill-appearing, toxic-appearing or diaphoretic     HENT: Head: Normocephalic  Cardiovascular:      Rate and Rhythm: Normal rate  Pulses: Normal pulses  Pulmonary:      Effort: Pulmonary effort is normal    Abdominal:      General: Bowel sounds are normal    Musculoskeletal:         General: Swelling and tenderness present  Comments: She has focal tenderness at the Right 5th metatarsal base  I note muscle function of the anterior, posterior, evertor and invertor muscle groups of the lower leg are intact and normal  I note ROM of the ankle joint, subtalar joint, Choparts and Lisfranc's joint complexes and metatarsophalangeal joints are WNL without crepitus nor restrictions bilaterally  Neurological:      General: No focal deficit present  Mental Status: She is alert and oriented to person, place, and time  Psychiatric:         Mood and Affect: Mood normal          Behavior: Behavior normal              ASSESSMENT     Diagnoses and all orders for this visit:    Preop testing  -     CBC and differential; Future  -     Basic metabolic panel; Future  -     Ambulatory referral to [facility]; Future    Closed nondisplaced fracture of fifth metatarsal bone of right foot with delayed healing  -     Ambulatory referral to Podiatry  -     Case request operating room: OPEN REDUCTION W/ INTERNAL FIXATION (ORIF) FOOT; Standing  -     CBC and differential; Future  -     Basic metabolic panel; Future  -     Ambulatory referral to [facility]; Future  -     Case request operating room: OPEN REDUCTION W/ INTERNAL FIXATION (ORIF) FOOT    Nonunion of fracture of foot, right    Closed nondisplaced fracture of fifth metatarsal bone of right foot with nonunion    Other orders  -     Incentive spirometry; Standing  -     Insert and maintain IV line; Standing  -     Void On-Call to O R ; Standing  -     Nursing Communication CHG bath, have staff wash entire body (neck down) per pre-op bathing protocol  Routine, evening prior to, and day of surgery  ; Standing  -     Electrocardiogram, 12-lead; Standing  -     clindamycin (CLEOCIN) 900 mg in sodium chloride 0 9 % 50 mL IVPB         Problem List Items Addressed This Visit        Musculoskeletal and Integument    Closed nondisplaced fracture of fifth metatarsal bone of right foot with nonunion    Nonunion of fracture of foot, right      Other Visit Diagnoses     Preop testing    -  Primary    Relevant Orders    CBC and differential    Basic metabolic panel    Ambulatory referral to Family Practice    Closed nondisplaced fracture of fifth metatarsal bone of right foot with delayed healing        Relevant Orders    Case request operating room: OPEN REDUCTION W/ INTERNAL FIXATION (ORIF) FOOT (Completed)    CBC and differential    Basic metabolic panel    Ambulatory referral to Family Practice            Problems:      Chronic illness / Problem not at goal with exacerbation, progression or side effects of treatment  1  Non-union fifth metatarsal fracture  2  Prior DVT      PLAN    I reviewed the venous duplex dated 5/3/21 which does not show residual thrombosis  I was very clear at the beginning of the discussion about alternatives to this surgery including benign neglect, bracing, and second surgical opinions  I spent time to discuss with the patient the surgical procedure(s) as open reduction and internal fixation with screw of the metatarsal, pre-op testing, and post-op course (4-6 weeks strict NWB followed by another 4-6 weeks protected WB in [facility])required to properly heal the surgery  I discussed risks as infection, scar, swelling, chronic pain, painful or prominent hardware, possible need to remove hardware, poor healing of incision or bone that could require more surgery, incomplete correction of deformities or recurrence of deformities, change in shape of foot, toe, or walking and function, numbness, neuritis/RSD, blood clots in the leg or lung, and even death from anesthesia complications   No guarantees were given and the possibility of continued non-union or pain were discussed before patient signed the consent form  We also discussed the need for anticoagulation after surgery  The offloading device necessary after the surgery will be her rollabout device which she is to bring to surgery  The surgery, history and physical and PATS will be scheduled

## 2021-05-24 ENCOUNTER — OFFICE VISIT (OUTPATIENT)
Dept: FAMILY MEDICINE CLINIC | Facility: CLINIC | Age: 56
End: 2021-05-24
Payer: OTHER MISCELLANEOUS

## 2021-05-24 ENCOUNTER — TRANSCRIBE ORDERS (OUTPATIENT)
Dept: ADMINISTRATIVE | Facility: HOSPITAL | Age: 56
End: 2021-05-24

## 2021-05-24 ENCOUNTER — APPOINTMENT (OUTPATIENT)
Dept: LAB | Facility: HOSPITAL | Age: 56
End: 2021-05-24
Payer: COMMERCIAL

## 2021-05-24 VITALS — DIASTOLIC BLOOD PRESSURE: 72 MMHG | TEMPERATURE: 97 F | SYSTOLIC BLOOD PRESSURE: 118 MMHG

## 2021-05-24 DIAGNOSIS — K21.00 GASTROESOPHAGEAL REFLUX DISEASE WITH ESOPHAGITIS WITHOUT HEMORRHAGE: ICD-10-CM

## 2021-05-24 DIAGNOSIS — G40.909 SEIZURE DISORDER (HCC): ICD-10-CM

## 2021-05-24 DIAGNOSIS — G40.909 SEIZURE DISORDER (HCC): Primary | ICD-10-CM

## 2021-05-24 DIAGNOSIS — E78.2 MIXED HYPERLIPIDEMIA: ICD-10-CM

## 2021-05-24 DIAGNOSIS — Z01.810 PREOP CARDIOVASCULAR EXAM: Primary | ICD-10-CM

## 2021-05-24 DIAGNOSIS — S92.354K CLOSED NONDISPLACED FRACTURE OF FIFTH METATARSAL BONE OF RIGHT FOOT WITH NONUNION: ICD-10-CM

## 2021-05-24 LAB
ALBUMIN SERPL BCP-MCNC: 3.8 G/DL (ref 3.5–5)
ALP SERPL-CCNC: 95 U/L (ref 46–116)
ALT SERPL W P-5'-P-CCNC: 46 U/L (ref 12–78)
ANION GAP SERPL CALCULATED.3IONS-SCNC: 8 MMOL/L (ref 4–13)
AST SERPL W P-5'-P-CCNC: 22 U/L (ref 5–45)
BASOPHILS # BLD AUTO: 0.05 THOUSANDS/ΜL (ref 0–0.1)
BASOPHILS NFR BLD AUTO: 1 % (ref 0–1)
BILIRUB SERPL-MCNC: 0.21 MG/DL (ref 0.2–1)
BUN SERPL-MCNC: 15 MG/DL (ref 5–25)
CALCIUM SERPL-MCNC: 9 MG/DL (ref 8.3–10.1)
CHLORIDE SERPL-SCNC: 107 MMOL/L (ref 100–108)
CO2 SERPL-SCNC: 28 MMOL/L (ref 21–32)
CREAT SERPL-MCNC: 0.97 MG/DL (ref 0.6–1.3)
EOSINOPHIL # BLD AUTO: 0.19 THOUSAND/ΜL (ref 0–0.61)
EOSINOPHIL NFR BLD AUTO: 3 % (ref 0–6)
ERYTHROCYTE [DISTWIDTH] IN BLOOD BY AUTOMATED COUNT: 13.1 % (ref 11.6–15.1)
GFR SERPL CREATININE-BSD FRML MDRD: 65 ML/MIN/1.73SQ M
GLUCOSE SERPL-MCNC: 119 MG/DL (ref 65–140)
HCT VFR BLD AUTO: 42.3 % (ref 34.8–46.1)
HGB BLD-MCNC: 13.5 G/DL (ref 11.5–15.4)
IMM GRANULOCYTES # BLD AUTO: 0.04 THOUSAND/UL (ref 0–0.2)
IMM GRANULOCYTES NFR BLD AUTO: 1 % (ref 0–2)
LYMPHOCYTES # BLD AUTO: 1.96 THOUSANDS/ΜL (ref 0.6–4.47)
LYMPHOCYTES NFR BLD AUTO: 30 % (ref 14–44)
MCH RBC QN AUTO: 29.7 PG (ref 26.8–34.3)
MCHC RBC AUTO-ENTMCNC: 31.9 G/DL (ref 31.4–37.4)
MCV RBC AUTO: 93 FL (ref 82–98)
MONOCYTES # BLD AUTO: 0.46 THOUSAND/ΜL (ref 0.17–1.22)
MONOCYTES NFR BLD AUTO: 7 % (ref 4–12)
NEUTROPHILS # BLD AUTO: 3.89 THOUSANDS/ΜL (ref 1.85–7.62)
NEUTS SEG NFR BLD AUTO: 58 % (ref 43–75)
NRBC BLD AUTO-RTO: 0 /100 WBCS
PLATELET # BLD AUTO: 223 THOUSANDS/UL (ref 149–390)
PMV BLD AUTO: 9.9 FL (ref 8.9–12.7)
POTASSIUM SERPL-SCNC: 4.4 MMOL/L (ref 3.5–5.3)
PROT SERPL-MCNC: 7.4 G/DL (ref 6.4–8.2)
RBC # BLD AUTO: 4.55 MILLION/UL (ref 3.81–5.12)
SODIUM SERPL-SCNC: 143 MMOL/L (ref 136–145)
WBC # BLD AUTO: 6.59 THOUSAND/UL (ref 4.31–10.16)

## 2021-05-24 PROCEDURE — 80053 COMPREHEN METABOLIC PANEL: CPT

## 2021-05-24 PROCEDURE — 99214 OFFICE O/P EST MOD 30 MIN: CPT | Performed by: FAMILY MEDICINE

## 2021-05-24 PROCEDURE — 36415 COLL VENOUS BLD VENIPUNCTURE: CPT

## 2021-05-24 PROCEDURE — 85025 COMPLETE CBC W/AUTO DIFF WBC: CPT

## 2021-05-24 NOTE — H&P (VIEW-ONLY)
Assessment/Plan: EKG reviewed which shows normal sinus rhythm  Patient low risk for cardiopulmonary event  Okay to proceed with surgery  Guidance given  Patient will stop vitamins 1 week prior to surgery  Diagnoses and all orders for this visit:    Preop cardiovascular exam    Closed nondisplaced fracture of fifth metatarsal bone of right foot with nonunion    Gastroesophageal reflux disease with esophagitis without hemorrhage    Mixed hyperlipidemia            Subjective:        Patient ID: Tata Randle is a 60 y o  female  Patient is here for preop clearance requested by Dr Jeffery Patel for right foot surgery to be done on June 11th  Patient did have COVID vaccine  Patient without any fevers or chills URI symptoms or cough  No chest pain shortness of breath palpitations syncope or dizziness  no bleeding issues  No pain at present time        The following portions of the patient's history were reviewed and updated as appropriate: allergies, current medications, past family history, past medical history, past social history, past surgical history and problem list       Review of Systems   Constitutional: Negative  HENT: Negative  Eyes: Negative  Respiratory: Negative  Cardiovascular: Negative  Gastrointestinal: Negative  Endocrine: Negative  Genitourinary: Negative  Musculoskeletal: Negative for arthralgias  Skin: Negative  Allergic/Immunologic: Negative  Neurological: Negative  Hematological: Negative  Psychiatric/Behavioral: Negative  Objective:      BMI Counseling: There is no height or weight on file to calculate BMI  The BMI is above normal  Nutrition recommendations include decreasing portion sizes  Exercise recommendations include strength training exercises  Depression Screening and Follow-up Plan: Clincally patient does not have depression  No treatment is required               /72 (BP Location: Right arm, Patient Position: Sitting, Cuff Size: Standard)   Temp (!) 97 °F (36 1 °C) (Tympanic)   LMP  (LMP Unknown)          Physical Exam  Vitals signs and nursing note reviewed  Constitutional:       General: She is not in acute distress  Appearance: Normal appearance  She is not ill-appearing, toxic-appearing or diaphoretic  HENT:      Head: Normocephalic and atraumatic  Right Ear: Tympanic membrane, ear canal and external ear normal  There is no impacted cerumen  Left Ear: Tympanic membrane, ear canal and external ear normal  There is no impacted cerumen  Nose: Nose normal  No congestion or rhinorrhea  Mouth/Throat:      Mouth: Mucous membranes are moist       Pharynx: No oropharyngeal exudate or posterior oropharyngeal erythema  Eyes:      General: No scleral icterus  Right eye: No discharge  Left eye: No discharge  Extraocular Movements: Extraocular movements intact  Conjunctiva/sclera: Conjunctivae normal       Pupils: Pupils are equal, round, and reactive to light  Neck:      Musculoskeletal: Normal range of motion and neck supple  No neck rigidity or muscular tenderness  Vascular: No carotid bruit  Cardiovascular:      Rate and Rhythm: Normal rate and regular rhythm  Pulses: Normal pulses  Heart sounds: Normal heart sounds  No murmur  No friction rub  No gallop  Pulmonary:      Effort: Pulmonary effort is normal  No respiratory distress  Breath sounds: Normal breath sounds  No stridor  No wheezing, rhonchi or rales  Chest:      Chest wall: No tenderness  Abdominal:      General: Abdomen is flat  Bowel sounds are normal  There is no distension  Palpations: Abdomen is soft  Tenderness: There is no abdominal tenderness  There is no guarding or rebound  Musculoskeletal:         General: Deformity present  No swelling, tenderness or signs of injury  Right lower leg: No edema  Left lower leg: No edema        Comments: Right foot in boot   Lymphadenopathy:      Cervical: No cervical adenopathy  Skin:     General: Skin is warm and dry  Capillary Refill: Capillary refill takes less than 2 seconds  Coloration: Skin is not jaundiced  Findings: No bruising, erythema, lesion or rash  Neurological:      General: No focal deficit present  Mental Status: She is alert and oriented to person, place, and time  Cranial Nerves: No cranial nerve deficit  Sensory: No sensory deficit  Motor: No weakness  Coordination: Coordination normal       Gait: Gait normal    Psychiatric:         Mood and Affect: Mood normal          Behavior: Behavior normal          Thought Content:  Thought content normal          Judgment: Judgment normal

## 2021-05-24 NOTE — PROGRESS NOTES
Assessment/Plan: EKG reviewed which shows normal sinus rhythm  Patient low risk for cardiopulmonary event  Okay to proceed with surgery  Guidance given  Patient will stop vitamins 1 week prior to surgery  Diagnoses and all orders for this visit:    Preop cardiovascular exam    Closed nondisplaced fracture of fifth metatarsal bone of right foot with nonunion    Gastroesophageal reflux disease with esophagitis without hemorrhage    Mixed hyperlipidemia            Subjective:        Patient ID: Brianna Bey is a 60 y o  female  Patient is here for preop clearance requested by Dr Hinton Person for right foot surgery to be done on June 11th  Patient did have COVID vaccine  Patient without any fevers or chills URI symptoms or cough  No chest pain shortness of breath palpitations syncope or dizziness  no bleeding issues  No pain at present time        The following portions of the patient's history were reviewed and updated as appropriate: allergies, current medications, past family history, past medical history, past social history, past surgical history and problem list       Review of Systems   Constitutional: Negative  HENT: Negative  Eyes: Negative  Respiratory: Negative  Cardiovascular: Negative  Gastrointestinal: Negative  Endocrine: Negative  Genitourinary: Negative  Musculoskeletal: Negative for arthralgias  Skin: Negative  Allergic/Immunologic: Negative  Neurological: Negative  Hematological: Negative  Psychiatric/Behavioral: Negative  Objective:      BMI Counseling: There is no height or weight on file to calculate BMI  The BMI is above normal  Nutrition recommendations include decreasing portion sizes  Exercise recommendations include strength training exercises  Depression Screening and Follow-up Plan: Clincally patient does not have depression  No treatment is required               /72 (BP Location: Right arm, Patient Position: Sitting, Cuff Size: Standard)   Temp (!) 97 °F (36 1 °C) (Tympanic)   LMP  (LMP Unknown)          Physical Exam  Vitals signs and nursing note reviewed  Constitutional:       General: She is not in acute distress  Appearance: Normal appearance  She is not ill-appearing, toxic-appearing or diaphoretic  HENT:      Head: Normocephalic and atraumatic  Right Ear: Tympanic membrane, ear canal and external ear normal  There is no impacted cerumen  Left Ear: Tympanic membrane, ear canal and external ear normal  There is no impacted cerumen  Nose: Nose normal  No congestion or rhinorrhea  Mouth/Throat:      Mouth: Mucous membranes are moist       Pharynx: No oropharyngeal exudate or posterior oropharyngeal erythema  Eyes:      General: No scleral icterus  Right eye: No discharge  Left eye: No discharge  Extraocular Movements: Extraocular movements intact  Conjunctiva/sclera: Conjunctivae normal       Pupils: Pupils are equal, round, and reactive to light  Neck:      Musculoskeletal: Normal range of motion and neck supple  No neck rigidity or muscular tenderness  Vascular: No carotid bruit  Cardiovascular:      Rate and Rhythm: Normal rate and regular rhythm  Pulses: Normal pulses  Heart sounds: Normal heart sounds  No murmur  No friction rub  No gallop  Pulmonary:      Effort: Pulmonary effort is normal  No respiratory distress  Breath sounds: Normal breath sounds  No stridor  No wheezing, rhonchi or rales  Chest:      Chest wall: No tenderness  Abdominal:      General: Abdomen is flat  Bowel sounds are normal  There is no distension  Palpations: Abdomen is soft  Tenderness: There is no abdominal tenderness  There is no guarding or rebound  Musculoskeletal:         General: Deformity present  No swelling, tenderness or signs of injury  Right lower leg: No edema  Left lower leg: No edema        Comments: Right foot in boot   Lymphadenopathy:      Cervical: No cervical adenopathy  Skin:     General: Skin is warm and dry  Capillary Refill: Capillary refill takes less than 2 seconds  Coloration: Skin is not jaundiced  Findings: No bruising, erythema, lesion or rash  Neurological:      General: No focal deficit present  Mental Status: She is alert and oriented to person, place, and time  Cranial Nerves: No cranial nerve deficit  Sensory: No sensory deficit  Motor: No weakness  Coordination: Coordination normal       Gait: Gait normal    Psychiatric:         Mood and Affect: Mood normal          Behavior: Behavior normal          Thought Content:  Thought content normal          Judgment: Judgment normal

## 2021-06-08 NOTE — PRE-PROCEDURE INSTRUCTIONS
Pre-Surgery Instructions:   Medication Instructions    acetaminophen (TYLENOL) 325 mg tablet hold day of surgery    Cholecalciferol (VITAMIN D3 GUMMIES PO) hold till after surgery    Multiple Minerals-Vitamins (CALCIUM-MAGNESIUM-ZINC-D3 PO) hold till after surgery    topiramate (TOPAMAX) 100 mg tablet take day of surgery    My Surgical Experience    The following information was developed to assist you to prepare for your operation  What do I need to do before coming to the hospital?   Arrange for a responsible person to drive you to and from the hospital    Arrange care for your children at home  Children are not allowed in the recovery areas of the hospital   Plan to wear clothing that is easy to put on and take off  If you are having shoulder surgery, wear a shirt that buttons or zippers in the front  Bathing  o Shower the evening before and the morning of your surgery with an antibacterial soap  Please refer to the Pre Op Showering Instructions for Surgery Patients Sheet   o Remove nail polish and all body piercing jewelry  o Do not shave any body part for at least 24 hours before surgery-this includes face, arms, legs and upper body  Food  o Nothing to eat or drink after midnight the night before your surgery  This includes candy and chewing gum  o Exception: If your surgery is after 12:00pm (noon), you may have clear liquids such as 7-Up®, ginger ale, apple or cranberry juice, Jell-O®, water, or clear broth until 8:00 am  o Do not drink milk or juice with pulp on the morning before surgery  o Do not drink alcohol 24 hours before surgery  Medicine  o Follow instructions you received from your surgeon about which medicines you may take on the day of surgery  o If instructed to take medicine on the morning of surgery, take pills with just a small sip of water   Call your prescribing doctor for specific infroamtion on what to do if you take insulin    What should I bring to the hospital?    Bring:  Regis Ackerman or a walker, if you have them, for foot or knee surgery   A list of the daily medicines, vitamins, minerals, herbals and nutritional supplements you take  Include the dosages of medicines and the time you take them each day   Glasses, dentures or hearing aids   Minimal clothing; you will be wearing hospital sleepwear   Photo ID; required to verify your identity   If you have a Living Will or Power of , bring a copy of the documents   If you have an ostomy, bring an extra pouch and any supplies you use    Do not bring   Medicines or inhalers   Money, valuables or jewelry    What other information should I know about the day of surgery?  Notify your surgeons if you develop a cold, sore throat, cough, fever, rash or any other illness   Report to the Ambulatory Surgical/Same Day Surgery Unit   You will be instructed to stop at Registration only if you have not been pre-registered   Inform your  fi they do not stay that they will be asked by the staff to leave a phone number where they can be reached   Be available to be reached before surgery  In the event the operating room schedule changes, you may be asked to come in earlier or later than expected    *It is important to tell your doctor and others involved in your health care if you are taking or have been taking any non-prescription drugs, vitamins, minerals, herbals or other nutritional supplements   Any of these may interact with some food or medicines and cause a reaction

## 2021-06-09 ENCOUNTER — ANESTHESIA EVENT (OUTPATIENT)
Dept: PERIOP | Facility: HOSPITAL | Age: 56
End: 2021-06-09
Payer: OTHER MISCELLANEOUS

## 2021-06-11 ENCOUNTER — HOSPITAL ENCOUNTER (OUTPATIENT)
Dept: RADIOLOGY | Facility: HOSPITAL | Age: 56
Discharge: HOME | End: 2021-06-11
Payer: OTHER MISCELLANEOUS

## 2021-06-11 ENCOUNTER — HOSPITAL ENCOUNTER (OUTPATIENT)
Facility: HOSPITAL | Age: 56
Setting detail: OUTPATIENT SURGERY
Discharge: HOME | End: 2021-06-11
Attending: PODIATRIST | Admitting: PODIATRIST
Payer: OTHER MISCELLANEOUS

## 2021-06-11 ENCOUNTER — ANESTHESIA (OUTPATIENT)
Dept: PERIOP | Facility: HOSPITAL | Age: 56
End: 2021-06-11
Payer: OTHER MISCELLANEOUS

## 2021-06-11 ENCOUNTER — APPOINTMENT (OUTPATIENT)
Dept: RADIOLOGY | Facility: HOSPITAL | Age: 56
End: 2021-06-11
Payer: OTHER MISCELLANEOUS

## 2021-06-11 VITALS
TEMPERATURE: 98.1 F | OXYGEN SATURATION: 93 % | RESPIRATION RATE: 16 BRPM | SYSTOLIC BLOOD PRESSURE: 101 MMHG | HEART RATE: 63 BPM | DIASTOLIC BLOOD PRESSURE: 59 MMHG | HEIGHT: 58 IN | BODY MASS INDEX: 42.19 KG/M2 | WEIGHT: 201 LBS

## 2021-06-11 DIAGNOSIS — S92.354G CLOSED NONDISPLACED FRACTURE OF FIFTH METATARSAL BONE OF RIGHT FOOT WITH DELAYED HEALING: ICD-10-CM

## 2021-06-11 DIAGNOSIS — Z98.890 STATUS POST SURGERY: Primary | ICD-10-CM

## 2021-06-11 PROBLEM — E66.01 CLASS 3 SEVERE OBESITY IN ADULT (HCC): Status: ACTIVE | Noted: 2021-06-11

## 2021-06-11 PROBLEM — E66.813 CLASS 3 SEVERE OBESITY IN ADULT (HCC): Status: ACTIVE | Noted: 2021-06-11

## 2021-06-11 PROCEDURE — 99024 POSTOP FOLLOW-UP VISIT: CPT | Performed by: PODIATRIST

## 2021-06-11 PROCEDURE — 28485 OPTX METATARSAL FX EACH: CPT | Performed by: PODIATRIST

## 2021-06-11 PROCEDURE — 73630 X-RAY EXAM OF FOOT: CPT

## 2021-06-11 PROCEDURE — C1769 GUIDE WIRE: HCPCS | Performed by: PODIATRIST

## 2021-06-11 PROCEDURE — 76000 FLUOROSCOPY <1 HR PHYS/QHP: CPT | Performed by: PODIATRIST

## 2021-06-11 PROCEDURE — C1713 ANCHOR/SCREW BN/BN,TIS/BN: HCPCS | Performed by: PODIATRIST

## 2021-06-11 PROCEDURE — 73620 X-RAY EXAM OF FOOT: CPT

## 2021-06-11 DEVICE — CANNULATED SCREW JFX
Type: IMPLANTABLE DEVICE | Site: FOOT | Status: FUNCTIONAL
Brand: ASNIS

## 2021-06-11 RX ORDER — FENTANYL CITRATE/PF 50 MCG/ML
25 SYRINGE (ML) INJECTION
Status: DISCONTINUED | OUTPATIENT
Start: 2021-06-11 | End: 2021-06-11 | Stop reason: HOSPADM

## 2021-06-11 RX ORDER — SODIUM CHLORIDE, SODIUM LACTATE, POTASSIUM CHLORIDE, CALCIUM CHLORIDE 600; 310; 30; 20 MG/100ML; MG/100ML; MG/100ML; MG/100ML
125 INJECTION, SOLUTION INTRAVENOUS CONTINUOUS
Status: DISCONTINUED | OUTPATIENT
Start: 2021-06-11 | End: 2021-06-11 | Stop reason: HOSPADM

## 2021-06-11 RX ORDER — HYDROMORPHONE HCL/PF 1 MG/ML
0.5 SYRINGE (ML) INJECTION
Status: DISCONTINUED | OUTPATIENT
Start: 2021-06-11 | End: 2021-06-11 | Stop reason: HOSPADM

## 2021-06-11 RX ORDER — ONDANSETRON 2 MG/ML
4 INJECTION INTRAMUSCULAR; INTRAVENOUS ONCE AS NEEDED
Status: DISCONTINUED | OUTPATIENT
Start: 2021-06-11 | End: 2021-06-11 | Stop reason: HOSPADM

## 2021-06-11 RX ORDER — FENTANYL CITRATE 50 UG/ML
INJECTION, SOLUTION INTRAMUSCULAR; INTRAVENOUS AS NEEDED
Status: DISCONTINUED | OUTPATIENT
Start: 2021-06-11 | End: 2021-06-11

## 2021-06-11 RX ORDER — OXYCODONE HYDROCHLORIDE AND ACETAMINOPHEN 5; 325 MG/1; MG/1
1 TABLET ORAL EVERY 4 HOURS PRN
Qty: 25 TABLET | Refills: 0 | Status: SHIPPED | OUTPATIENT
Start: 2021-06-11 | End: 2021-06-21

## 2021-06-11 RX ORDER — LIDOCAINE HYDROCHLORIDE 10 MG/ML
INJECTION, SOLUTION EPIDURAL; INFILTRATION; INTRACAUDAL; PERINEURAL AS NEEDED
Status: DISCONTINUED | OUTPATIENT
Start: 2021-06-11 | End: 2021-06-11

## 2021-06-11 RX ORDER — PROPOFOL 10 MG/ML
INJECTION, EMULSION INTRAVENOUS AS NEEDED
Status: DISCONTINUED | OUTPATIENT
Start: 2021-06-11 | End: 2021-06-11

## 2021-06-11 RX ORDER — ONDANSETRON 2 MG/ML
INJECTION INTRAMUSCULAR; INTRAVENOUS AS NEEDED
Status: DISCONTINUED | OUTPATIENT
Start: 2021-06-11 | End: 2021-06-11

## 2021-06-11 RX ORDER — MEPERIDINE HYDROCHLORIDE 25 MG/ML
12.5 INJECTION INTRAMUSCULAR; INTRAVENOUS; SUBCUTANEOUS
Status: DISCONTINUED | OUTPATIENT
Start: 2021-06-11 | End: 2021-06-11 | Stop reason: HOSPADM

## 2021-06-11 RX ORDER — MIDAZOLAM HYDROCHLORIDE 2 MG/2ML
INJECTION, SOLUTION INTRAMUSCULAR; INTRAVENOUS AS NEEDED
Status: DISCONTINUED | OUTPATIENT
Start: 2021-06-11 | End: 2021-06-11

## 2021-06-11 RX ORDER — OXYCODONE HYDROCHLORIDE AND ACETAMINOPHEN 5; 325 MG/1; MG/1
1 TABLET ORAL EVERY 4 HOURS PRN
Status: DISCONTINUED | OUTPATIENT
Start: 2021-06-11 | End: 2021-06-11 | Stop reason: HOSPADM

## 2021-06-11 RX ORDER — BUPIVACAINE HYDROCHLORIDE 2.5 MG/ML
INJECTION, SOLUTION EPIDURAL; INFILTRATION; INTRACAUDAL AS NEEDED
Status: DISCONTINUED | OUTPATIENT
Start: 2021-06-11 | End: 2021-06-11 | Stop reason: HOSPADM

## 2021-06-11 RX ORDER — CLINDAMYCIN PHOSPHATE 900 MG/50ML
900 INJECTION INTRAVENOUS ONCE
Status: COMPLETED | OUTPATIENT
Start: 2021-06-11 | End: 2021-06-11

## 2021-06-11 RX ORDER — MAGNESIUM HYDROXIDE 1200 MG/15ML
LIQUID ORAL AS NEEDED
Status: DISCONTINUED | OUTPATIENT
Start: 2021-06-11 | End: 2021-06-11 | Stop reason: HOSPADM

## 2021-06-11 RX ADMIN — PROPOFOL 150 MG: 10 INJECTION, EMULSION INTRAVENOUS at 14:30

## 2021-06-11 RX ADMIN — FENTANYL CITRATE 25 MCG: 50 INJECTION INTRAMUSCULAR; INTRAVENOUS at 15:13

## 2021-06-11 RX ADMIN — ONDANSETRON 4 MG: 2 INJECTION INTRAMUSCULAR; INTRAVENOUS at 15:17

## 2021-06-11 RX ADMIN — OXYCODONE HYDROCHLORIDE AND ACETAMINOPHEN 1 TABLET: 5; 325 TABLET ORAL at 17:16

## 2021-06-11 RX ADMIN — CLINDAMYCIN PHOSPHATE 900 MG: 18 INJECTION, SOLUTION INTRAMUSCULAR; INTRAVENOUS at 14:11

## 2021-06-11 RX ADMIN — LIDOCAINE HYDROCHLORIDE 50 MG: 10 INJECTION, SOLUTION EPIDURAL; INFILTRATION; INTRACAUDAL; PERINEURAL at 14:30

## 2021-06-11 RX ADMIN — MIDAZOLAM 2 MG: 1 INJECTION INTRAMUSCULAR; INTRAVENOUS at 14:26

## 2021-06-11 RX ADMIN — FENTANYL CITRATE 25 MCG: 50 INJECTION INTRAMUSCULAR; INTRAVENOUS at 16:07

## 2021-06-11 RX ADMIN — SODIUM CHLORIDE, SODIUM LACTATE, POTASSIUM CHLORIDE, AND CALCIUM CHLORIDE 125 ML/HR: .6; .31; .03; .02 INJECTION, SOLUTION INTRAVENOUS at 16:26

## 2021-06-11 RX ADMIN — FENTANYL CITRATE 100 MCG: 50 INJECTION INTRAMUSCULAR; INTRAVENOUS at 14:26

## 2021-06-11 RX ADMIN — PHENYLEPHRINE HYDROCHLORIDE 100 MCG: 10 INJECTION INTRAVENOUS at 15:04

## 2021-06-11 RX ADMIN — SODIUM CHLORIDE, SODIUM LACTATE, POTASSIUM CHLORIDE, AND CALCIUM CHLORIDE 125 ML/HR: .6; .31; .03; .02 INJECTION, SOLUTION INTRAVENOUS at 12:43

## 2021-06-11 RX ADMIN — PHENYLEPHRINE HYDROCHLORIDE 100 MCG: 10 INJECTION INTRAVENOUS at 15:13

## 2021-06-11 NOTE — DISCHARGE SUMMARY
Discharge Summary Outpatient Procedure Podiatry -   Lara Tran 60 y o  female MRN: [id number]  Unit/Bed#: OR POOL Encounter: [id number]    Admission Date: 6/11/2021     Admitting Diagnosis: Closed nondisplaced fracture of fifth metatarsal bone of right foot with delayed healing [S92 354G]    Discharge Diagnosis: same    Procedures Performed: OPEN REDUCTION W/ INTERNAL FIXATION (ORIF) FOOT: 71027 (CPT®)    Complications: none    Condition at Discharge: stable    Discharge instructions/Information to patient and family:   See after visit summary for information provided to patient and family  Provisions for Follow-Up Care/Important appointments:  See after visit summary for information related to follow-up care and any pertinent home health orders  Discharge Medications:  See after visit summary for reconciled discharge medications provided to patient and family

## 2021-06-11 NOTE — OP NOTE
OPERATIVE REPORT - Podiatry  PATIENT NAME: Christiano Cool    :  1965  MRN: [id number]  Pt Location: [facility] OR ROOM 03    SURGERY DATE: 2021    Surgeon(s) and Role:     * Yoli Heredia DPM - Primary     * Alhaji Villegas DPM - Assisting    Pre-op Diagnosis:  Closed nondisplaced fracture of fifth metatarsal bone of right foot with delayed healing [S92 354G]    Post-Op Diagnosis Codes:     * Closed nondisplaced fracture of fifth metatarsal bone of right foot with delayed healing [S92 354G]    Procedure(s) (LRB):  OPEN REDUCTION W/ INTERNAL FIXATION (ORIF) FOOT (Right)    Specimen(s):  * No specimens in log *    Estimated Blood Loss:   Minimal    Drains:  * No LDAs found *    Anesthesia Type:   IV Sedation   Hemostasis:  Manic thigh tourniquet at 300 mmHg for 44 minutes    Materials:  Implant Name Type Inv  Item Serial No   Lot No  LRB No  Used Action   Cannulated Screw [id number]      Right 1 Implanted     4-0 nylon    Operative Findings:  Consistently diagnosis    Complications:   None    Procedure and Technique:     Under mild sedation, the patient was brought into the operating room and placed on the operating room table in the supine position  IV sedation was achieved by anesthesia team and a universal timeout was performed where all parties are in agreement of correct patient, correct procedure and correct site  A pneumatic tourniquet was then placed over the patient's right lower extremity with ample padding  The foot was then prepped and draped in the usual aseptic manner  An esmarch bandage was used to exsangunate the foot and the pneumatic tourniquet was then inflated to 300mmHg  Attention was directed to the right lateral foot where under fluoroscopy incision was marked to inserted a guidewire into the 5th metatarsal   Next guidewire was utilized insert into the 5th metatarsal, C-arm fluoroscopy was used to confirm the position of the guidewire in various views, which was appropriate    A small stab incision was made to insert a drill bit and the screw  Utilizing an AO technique and manufacture's protocol a 4 0x44mm  cannulated headed screw was inserted  The final position of the screw was confirmed utilizing C-arm fluoroscopy, which was appropriate  The incision was then rinsed with copious amount of normal sterile saline  The skin edges was then reapproximated utilizing a 4-0 nylon in horizontal mattress fashion  Incision was then dressed with Xeroform, 4 x 4 gauze, Kerlix, Webril and an Ace wrap  The tourniquet was deflated at approximately 44 min and normal hyperemic response was noted to all digits  The patient tolerated the procedure and anesthesia well without immediate complications and transferred to PACU with vital signs stable  Dr Lenin Calderon was present during the entire procedure and participated in all key aspects  SIGNATUREGatkiersten Keen DPM  DATE: June 11, 2021  TIME: 3:35 PM      Portions of the record may have been created with voice recognition software  Occasional wrong word or "sound a like" substitutions may have occurred due to the inherent limitations of voice recognition software  Read the chart carefully and recognize, using context, where substitutions have occurred

## 2021-06-11 NOTE — ANESTHESIA PREPROCEDURE EVALUATION
Procedure:  OPEN REDUCTION W/ INTERNAL FIXATION (ORIF) FOOT (Right Foot)    Relevant Problems   CARDIO   (+) Hyperlipidemia   (+) Precordial pain      GI/HEPATIC   (+) Esophageal reflux      MUSCULOSKELETAL   (+) Lumbar spondylosis   (+) Spondylosis of cervical region without myelopathy or radiculopathy      NEURO/PSYCH   (+) Seizure (HCC)      Other   (+) Class 3 severe obesity in adult [facility])        Physical Exam    Airway    Mallampati score: II  TM Distance: >3 FB  Neck ROM: full     Dental   No notable dental hx upper dentures,     Cardiovascular  Rhythm: regular, Rate: normal, Cardiovascular exam normal    Pulmonary  Pulmonary exam normal Breath sounds clear to auscultation,     Other Findings        Anesthesia Plan  ASA Score- 3     Anesthesia Type- general with ASA Monitors  Additional Monitors:   Airway Plan: LMA  Plan Factors-Exercise tolerance (METS): >4 METS  Chart reviewed  Patient summary reviewed  Patient is not a current smoker  Patient did not smoke on day of surgery  Obstructive sleep apnea risk education given perioperatively  Induction- intravenous  Postoperative Plan-     Informed Consent- Anesthetic plan and risks discussed with patient  I personally reviewed this patient with the CRNA  Discussed and agreed on the Anesthesia Plan with the CRNA  Yevgeniy Lamb

## 2021-06-11 NOTE — DISCHARGE INSTRUCTIONS
Dr Chiqui Dean Instructions    1  Take your prescribed medication as directed  2  Upon arrival at home, lie down and elevate your surgical foot on 2 pillows  3  Remain quiet, off your feet as much as possible, for the first 24-48 hours  This is when your feet first swell and may become painful  After 48 hours you may begin limited walking following these restrictions:   Nonweightbearinbg to surgical foot use cam walker and a knee scooter   4  Drink large quantities of water  Consume no alcohol  Continue a well-balanced diet  5  Report any unusual discomfort or fever to this office  6  A limited amount of discomfort and swelling is to be expected  In some cases the skin may take on a bruised appearance  The surgical solution that was applied to your foot prior to the operation is dark in color and the operation site may appear to be oozing when it actually is not  7  A slight amount of blood is to be expected, and is no cause for alarm  Do not remove the dressings  If there is active bleeding and if the bleeding persists, add additional gauze to the bandage, apply direct pressure, elevate your feet and call this office  8  Do not get the dressings wet  As regular bathing may be inconvenient, sponge baths are recommended  9  When anesthesia wears off and if any discomfort should be present, apply an ice pack directly over the operated area for 15 minute intervals for several hours or until the pain leaves  (USE IN EXCESS OF 15 MINUTES COULD CAUSE FROSTBITE)  Do not use hot water bags or electric pads  A convenient icepack can be made by placing ice cubes in a plastic bag and covering this with a towel  10  If necessary, take a mild laxative before retiring  11  Wear your special open shoes anytime you put weight on your foot, even if it is just to walk to the bathroom and back  It will probably be 2 or 3 weeks before you will be permitted to try regular shoes    12  Having performed the operation, we are interested in a prompt recovery  Please cooperate by following the above instructions  13  Please call to confirm your post-op appointment or call with any other questions

## 2021-06-11 NOTE — INTERVAL H&P NOTE
H&P reviewed  After examining the patient I find no changes in the patients condition since the H&P had been written      Vitals:    06/11/21 1240   BP: 121/64   Pulse: 66   Resp: 18   Temp: (!) 97 4 °F (36 3 °C)   SpO2: 96%

## 2021-06-11 NOTE — PERIOPERATIVE NURSING NOTE
Patient received 25 mcg IV Fentanyl at 1607  Patient's oxygen saturation on room air decreased from 97% to 87%  Patient encouraged to deep breath and cough  Utilizing incentive spirometer  With deep breaths, patient's oxygen saturation increases to 96%

## 2021-06-11 NOTE — ANESTHESIA POSTPROCEDURE EVALUATION
Post-Op Assessment Note    CV Status:  Stable    Pain management: adequate     Mental Status:  Alert and awake   Hydration Status:  Euvolemic   PONV Controlled:  Controlled   Airway Patency:  Patent      Post Op Vitals Reviewed: Yes      Staff: Anesthesiologist   Reason for prolonged intubation > 24 hours:  NaReason for prolonged intubation > 48 hours:  Na      No complications documented      BP (P) 117/66 (06/11/21 1532)    Temp 98 3 °F (36 8 °C) (06/11/21 1530)    Pulse     Resp (P) 16 (06/11/21 1532)    SpO2

## 2021-06-21 ENCOUNTER — OFFICE VISIT (OUTPATIENT)
Dept: PODIATRY | Facility: CLINIC | Age: 56
End: 2021-06-21

## 2021-06-21 VITALS
BODY MASS INDEX: 42.01 KG/M2 | HEART RATE: 73 BPM | SYSTOLIC BLOOD PRESSURE: 115 MMHG | DIASTOLIC BLOOD PRESSURE: 78 MMHG | HEIGHT: 58 IN

## 2021-06-21 DIAGNOSIS — Z09 POSTOP CHECK: Primary | ICD-10-CM

## 2021-06-21 PROCEDURE — 99024 POSTOP FOLLOW-UP VISIT: CPT | Performed by: PODIATRIST

## 2021-07-13 ENCOUNTER — APPOINTMENT (OUTPATIENT)
Dept: RADIOLOGY | Facility: CLINIC | Age: 56
End: 2021-07-13
Payer: OTHER MISCELLANEOUS

## 2021-07-13 ENCOUNTER — OFFICE VISIT (OUTPATIENT)
Dept: PODIATRY | Facility: CLINIC | Age: 56
End: 2021-07-13

## 2021-07-13 VITALS
HEART RATE: 69 BPM | DIASTOLIC BLOOD PRESSURE: 82 MMHG | HEIGHT: 58 IN | WEIGHT: 202 LBS | SYSTOLIC BLOOD PRESSURE: 120 MMHG | BODY MASS INDEX: 42.4 KG/M2

## 2021-07-13 DIAGNOSIS — S92.354G CLOSED NONDISPLACED FRACTURE OF FIFTH METATARSAL BONE OF RIGHT FOOT WITH DELAYED HEALING: ICD-10-CM

## 2021-07-13 DIAGNOSIS — S92.354G CLOSED NONDISPLACED FRACTURE OF FIFTH METATARSAL BONE OF RIGHT FOOT WITH DELAYED HEALING: Primary | ICD-10-CM

## 2021-07-13 PROCEDURE — 99024 POSTOP FOLLOW-UP VISIT: CPT | Performed by: PODIATRIST

## 2021-07-13 PROCEDURE — 73620 X-RAY EXAM OF FOOT: CPT

## 2021-07-13 NOTE — PROGRESS NOTES
Assessment/Plan:      Diagnoses and all orders for this visit:    Closed nondisplaced fracture of fifth metatarsal bone of right foot with delayed healing  -     XR foot 2 vw right; Future      X-ray shows stable alignment of fracture but minimal bone callus  Hardware is intact  No sign of complication  Her pain is improved from a month ago but she is still rather achy  Recommend she remain nonweightbearing another 3-4 weeks  Sutures were removed  Incision is healed  Subjective:     Patient ID: Lazaro Viramontes is a 60 y o  female  Patient had right 5th metatarsal ORIF about a month ago (DOS 6/11/21)  She is still getting some pain  She gets shots of pain shoot up her foot  The incision is healed  She is using a knee scooter  Review of Systems      Objective:     Physical Exam    Right foot exam, N/V intact  Tenderness 5th ray

## 2021-07-13 NOTE — LETTER
July 13, 2021     Patient: Pili Weaver   YOB: 1965   Date of Visit: 7/13/2021       To Whom it May Concern:    Pili Weaver is under my professional care  She was seen in my office on 7/13/2021  She is still healing from her foot fracture  She needs to remain NWB to her foot which unfortunately will not allow her to work  Please excuse  We will reevaluate in 3-4 weeks  If you have any questions or concerns, please don't hesitate to call           Sincerely,          Ben Mai, DPM        CC: No Recipients

## 2021-07-22 ENCOUNTER — TELEPHONE (OUTPATIENT)
Dept: PODIATRY | Facility: CLINIC | Age: 56
End: 2021-07-22

## 2021-07-22 NOTE — TELEPHONE ENCOUNTER
At the request of [employer], I faxed over the treatment notes from 7/13/2021 to [phone]   Attention:  Eduardo Padgett

## 2021-08-13 ENCOUNTER — OFFICE VISIT (OUTPATIENT)
Dept: PODIATRY | Facility: CLINIC | Age: 56
End: 2021-08-13

## 2021-08-13 ENCOUNTER — APPOINTMENT (OUTPATIENT)
Dept: RADIOLOGY | Facility: CLINIC | Age: 56
End: 2021-08-13
Payer: COMMERCIAL

## 2021-08-13 DIAGNOSIS — S92.354K CLOSED NONDISPLACED FRACTURE OF FIFTH METATARSAL BONE OF RIGHT FOOT WITH NONUNION: Primary | ICD-10-CM

## 2021-08-13 DIAGNOSIS — Z09 POSTOP CHECK: ICD-10-CM

## 2021-08-13 DIAGNOSIS — S92.354K CLOSED NONDISPLACED FRACTURE OF FIFTH METATARSAL BONE OF RIGHT FOOT WITH NONUNION: ICD-10-CM

## 2021-08-13 PROCEDURE — 99024 POSTOP FOLLOW-UP VISIT: CPT | Performed by: PODIATRIST

## 2021-08-13 PROCEDURE — 73630 X-RAY EXAM OF FOOT: CPT

## 2021-08-13 NOTE — PROGRESS NOTES
This patient was seen on 8/13/21  Chief Complaint:  Randy Vuong is here for a post-op visit  Subjective:      Patient ID: Randy Vuong is a 60 y o  female  She is s/p ORIF of 5th metatarsal nonunion  She still gets some achiness  She's mostly NWB on rollabout  The following portions of the patient's history were reviewed and updated as appropriate: allergies, current medications, past family history, past medical history, past social history, past surgical history and problem list     Review of Systems   Constitutional: Negative  Negative for appetite change, chills, diaphoresis, fatigue, fever and unexpected weight change  Respiratory: Negative  Musculoskeletal: Negative for arthralgias and gait problem  Objective:    Randy Vuong appears stable, non-toxic and alert  LMP  (LMP Unknown)     Foot/Ankle Musculoskeletal Exam    General      Neurological: alert      General additional comments: I note mild pain on palpation of the ORIF site  Minimal edema noted  Physical Exam  Vitals and nursing note reviewed  Constitutional:       General: She is not in acute distress  Appearance: Normal appearance  She is not ill-appearing, toxic-appearing or diaphoretic  Musculoskeletal:      Comments: I note mild pain on palpation of the ORIF site  Minimal edema noted  Neurological:      Mental Status: She is alert  The incision is well-coapted without dehiscence, signs of infection, active drainage, necrosis  Calf is soft and non-tender  Neurovascular status is intact to the foot  Normal post-op edema and bruising is noted  Diagnoses and all orders for this visit:    Closed nondisplaced fracture of fifth metatarsal bone of right foot with nonunion  -     X-ray foot right 3+ views;  Future    Postop check         Problem List Items Addressed This Visit        Musculoskeletal and Integument    Closed nondisplaced fracture of fifth metatarsal bone of right foot with nonunion - Primary    Relevant Orders    X-ray foot right 3+ views       Other    Postop check          Assessment:  Healing post-op site -Nonunion fracture 5th metatarsal s/p ORIF    Plan:  Xrays ordered and reviewed noting intact hardware  Still some lateral cortical non-healing  I recommend she progress to [facility] in [place]  Follow-up 1 month  Will hold out of work until next follow-up and repeat xrays

## 2021-08-13 NOTE — LETTER
August 13, 2021     Patient: Lyudmila Romo   YOB: 1965   Date of Visit: 8/13/2021       To Whom it May Concern:    Lyudmila Romo is under my professional care  She was seen in my office on 8/13/2021  She may return to work on 9/13/21  If you have any questions or concerns, please don't hesitate to call           Sincerely,          Becky Guerrero DPM        CC: No Recipients

## 2021-08-13 NOTE — PROGRESS NOTES
This patient was seen on 6/21/21  Chief Complaint:  Lincoln Willoughby is here for a post-op visit  Subjective:      Patient ID: Lincoln Willoughby is a 60 y o  female  Teresa Ornelas is here s/p ORIF of the 5th metatarsal  She denies significant pain at the site  The following portions of the patient's history were reviewed and updated as appropriate: allergies, current medications, past family history, past medical history, past social history, past surgical history and problem list     Review of Systems   Constitutional: Negative  Negative for appetite change, chills, diaphoresis, fatigue, fever and unexpected weight change  Respiratory: Negative  Musculoskeletal: Negative for arthralgias and gait problem  Objective:    Lincoln Willoughby appears stable, non-toxic and alert  /78   Pulse 73   Ht 4' 10" (1 473 m) Comment: verbal  LMP  (LMP Unknown)   BMI 42 01 kg/m²     Foot/Ankle Musculoskeletal Exam    General      Neurological: alert       Physical Exam  Vitals and nursing note reviewed  Constitutional:       General: She is not in acute distress  Appearance: Normal appearance  She is not ill-appearing, toxic-appearing or diaphoretic  Neurological:      Mental Status: She is alert  The incision is well-coapted without dehiscence, signs of infection, active drainage, necrosis  Calf is soft and non-tender  Neurovascular status is intact to the foot  Normal post-op edema and bruising is noted  Diagnoses and all orders for this visit:    Postop check         Problem List Items Addressed This Visit        Other    Postop check - Primary          Assessment:  Healing post-op site  Plan:  Suture left intact  Patient to ice, elevate  Patient to remain NWB and wear boot and follow-up 2 weeks

## 2021-08-16 ENCOUNTER — TELEPHONE (OUTPATIENT)
Dept: PODIATRY | Facility: CLINIC | Age: 56
End: 2021-08-16
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
## 2021-08-17 ENCOUNTER — OFFICE VISIT (OUTPATIENT)
Dept: FAMILY MEDICINE CLINIC | Facility: CLINIC | Age: 56
End: 2021-08-17
Payer: COMMERCIAL

## 2021-08-17 ENCOUNTER — ANNUAL EXAM (OUTPATIENT)
Dept: OBGYN CLINIC | Facility: CLINIC | Age: 56
End: 2021-08-17
Payer: COMMERCIAL

## 2021-08-17 VITALS
SYSTOLIC BLOOD PRESSURE: 110 MMHG | HEIGHT: 59 IN | DIASTOLIC BLOOD PRESSURE: 80 MMHG | TEMPERATURE: 98.2 F | BODY MASS INDEX: 40.72 KG/M2

## 2021-08-17 VITALS
HEIGHT: 58 IN | WEIGHT: 201.6 LBS | SYSTOLIC BLOOD PRESSURE: 126 MMHG | DIASTOLIC BLOOD PRESSURE: 82 MMHG | BODY MASS INDEX: 42.32 KG/M2

## 2021-08-17 DIAGNOSIS — Z01.419 WELL WOMAN EXAM: Primary | ICD-10-CM

## 2021-08-17 DIAGNOSIS — S90.212A CONTUSION OF LEFT GREAT TOE WITH DAMAGE TO NAIL, INITIAL ENCOUNTER: Primary | ICD-10-CM

## 2021-08-17 DIAGNOSIS — D23.9 DYSPLASTIC NEVI: ICD-10-CM

## 2021-08-17 DIAGNOSIS — Z12.31 ENCOUNTER FOR SCREENING MAMMOGRAM FOR MALIGNANT NEOPLASM OF BREAST: ICD-10-CM

## 2021-08-17 PROCEDURE — 99396 PREV VISIT EST AGE 40-64: CPT | Performed by: PHYSICIAN ASSISTANT

## 2021-08-17 PROCEDURE — 0503F POSTPARTUM CARE VISIT: CPT | Performed by: PHYSICIAN ASSISTANT

## 2021-08-17 PROCEDURE — 99213 OFFICE O/P EST LOW 20 MIN: CPT | Performed by: FAMILY MEDICINE

## 2021-08-17 PROCEDURE — 0503F POSTPARTUM CARE VISIT: CPT | Performed by: FAMILY MEDICINE

## 2021-08-17 NOTE — PATIENT INSTRUCTIONS
Pap deferred due to guidelines  Mammo slip printed  Colonoscopy referral entered  Cont f/w PCP for routine care  F/u 1 year, earlier as needed

## 2021-08-17 NOTE — PROGRESS NOTES
Assessment/Plan: patient will use Aleve for pain as well as ice  Patient wishes to hold off with x-ray  Patient will be set up for surgical removal of lesion on back  Diagnoses and all orders for this visit:    Contusion of left great toe with damage to nail, initial encounter    Dysplastic nevi            Subjective:        Patient ID: Kathleen Allen is a 60 y o  female  Patient status post trauma to the left foot on Saturday  Patient fell on her deck out back  Patient with trauma to the 1st and 2nd toe  Patient has notice ecchymosis and swelling  The following portions of the patient's history were reviewed and updated as appropriate: allergies, current medications, past family history, past medical history, past social history, past surgical history and problem list       Review of Systems   Constitutional: Negative  HENT: Negative  Eyes: Negative  Respiratory: Negative  Cardiovascular: Negative  Gastrointestinal: Negative  Endocrine: Negative  Genitourinary: Negative  Musculoskeletal: Positive for arthralgias  Skin: Positive for color change  Allergic/Immunologic: Negative  Neurological: Negative  Hematological: Negative  Psychiatric/Behavioral: Negative  Objective:               /80 (BP Location: Right arm, Patient Position: Sitting, Cuff Size: Standard)   Temp 98 2 °F (36 8 °C) (Tympanic)   Ht 4' 11" (1 499 m)   LMP 08/01/2000 (Approximate)   BMI 40 72 kg/m²          Physical Exam  Constitutional:       General: She is not in acute distress  Appearance: Normal appearance  She is not ill-appearing, toxic-appearing or diaphoretic  HENT:      Head: Normocephalic and atraumatic  Musculoskeletal:         General: Tenderness and signs of injury present  Comments: Left 1st toe with ecchymosis and pain with palpation  2nd toe good range of motion  No significant pain with palpation  Some ecchymosis noted    No pain over forefoot  Neurovascular intact  Skin:     Comments: Irregular dysplastic nevi right thoracic region patient also with seborrheic keratosis right thoracic region  Neurological:      Mental Status: She is alert  Psychiatric:         Mood and Affect: Mood normal          Behavior: Behavior normal          Thought Content:  Thought content normal

## 2021-08-20 ENCOUNTER — TELEPHONE (OUTPATIENT)
Dept: PODIATRY | Facility: CLINIC | Age: 56
End: 2021-08-20

## 2021-08-25 ENCOUNTER — TELEPHONE (OUTPATIENT)
Dept: PODIATRY | Facility: CLINIC | Age: 56
End: 2021-08-25

## 2021-08-25 NOTE — TELEPHONE ENCOUNTER
Spoke with patient and clarified with Dr Hammond First  Patient is to be out of work until next [address] 9/13/21  Call placed to  ([phone]) and LM in regards to same  Will await call back

## 2021-08-26 ENCOUNTER — PROCEDURE VISIT (OUTPATIENT)
Dept: FAMILY MEDICINE CLINIC | Facility: CLINIC | Age: 56
End: 2021-08-26
Payer: COMMERCIAL

## 2021-08-26 VITALS — SYSTOLIC BLOOD PRESSURE: 110 MMHG | DIASTOLIC BLOOD PRESSURE: 78 MMHG

## 2021-08-26 DIAGNOSIS — D23.9 DYSPLASTIC NEVI: Primary | ICD-10-CM

## 2021-08-26 DIAGNOSIS — D49.2 SKIN NEOPLASM: ICD-10-CM

## 2021-08-26 PROCEDURE — 88344 IMHCHEM/IMCYTCHM EA MLT ANTB: CPT | Performed by: PATHOLOGY

## 2021-08-26 PROCEDURE — 88342 IMHCHEM/IMCYTCHM 1ST ANTB: CPT | Performed by: PATHOLOGY

## 2021-08-26 PROCEDURE — 11302 SHAVE SKIN LESION 1.1-2.0 CM: CPT | Performed by: FAMILY MEDICINE

## 2021-08-26 PROCEDURE — 88305 TISSUE EXAM BY PATHOLOGIST: CPT | Performed by: PATHOLOGY

## 2021-08-26 PROCEDURE — 99214 OFFICE O/P EST MOD 30 MIN: CPT | Performed by: FAMILY MEDICINE

## 2021-08-26 PROCEDURE — 11402 EXC TR-EXT B9+MARG 1.1-2 CM: CPT | Performed by: FAMILY MEDICINE

## 2021-08-26 NOTE — PROGRESS NOTES
Assessment/Plan: see procedure note  Guidance given overall  Patient will use Neosporin daily  Patient will follow-up in 8 days  Specimen sent to pathology  Diagnoses and all orders for this visit:    Skin neoplasm  -     Tissue Exam; Future    Dysplastic nevi    Other orders  -     Skin excision  -     Shave lesion            Subjective:        Patient ID: Amy Mabry is a 60 y o  female  Patient is here for removal lesion on back x2  Patient with dysplastic nevi with changes  Patient also with raise lesion with changes  The following portions of the patient's history were reviewed and updated as appropriate: allergies, current medications, past family history, past medical history, past social history, past surgical history and problem list       Review of Systems   Skin: Positive for color change  Objective:               /78 (BP Location: Right arm, Patient Position: Sitting, Cuff Size: Standard)   LMP 08/01/2000 (Approximate)          Physical Exam  Constitutional:       Appearance: Normal appearance  Skin:     Comments:   7 mm dysplastic nevus with irregular borders in shape right thoracic region midline  Patient with raised irregular lesion measuring 11 mm right thoracic region laterally   Neurological:      Mental Status: She is alert  Skin excision    Date/Time: 8/26/2021 3:59 PM  Performed by: Kika Meyer DO  Authorized by: Kika Meyer DO   Universal Protocol:  Consent: Verbal consent obtained  Written consent obtained  Consent given by: patient  Time out: Immediately prior to procedure a "time out" was called to verify the correct patient, procedure, equipment, support staff and site/side marked as required    Patient understanding: patient states understanding of the procedure being performed  Patient consent: the patient's understanding of the procedure matches consent given  Procedure consent: procedure consent matches procedure scheduled  Relevant documents: relevant documents present and verified  Patient identity confirmed: verbally with patient      Procedure Details - Skin Excision:     Number of Lesions:  1  Lesion 1:     Body area:  Trunk    Trunk location:  Back (  Thoracic right back midline)    Initial size (mm):  7       Final defect size (mm):  12    Malignancy: benign lesion      Repair type:  Linear closure    Graft type: split-thickness      Closure complexity: simple      Surgical defect:   12     Repair Comments:  Wound closed with 2 4-0 nylon sutures   Lesion 6:           Informed consent obtained  Betadine and alcohol use  2 cc of lidocaine with epinephrine used  Excision done in elliptical form  Specimen sent to pathology  Patient had 4-0 nylon sutures placed 2  Patient tolerated procedure well  Bacitracin and dry sterile dry sterile  dressing applied  Shave lesion    Date/Time: 8/26/2021 4:02 PM  Performed by: Whitney Bustos DO  Authorized by: Whitney Bustos DO   [place] Protocol:  Consent: Verbal consent obtained  Written consent obtained  Risks and benefits: risks, benefits and alternatives were discussed  Consent given by: patient  Time out: Immediately prior to procedure a "time out" was called to verify the correct patient, procedure, equipment, support staff and site/side marked as required  Patient understanding: patient states understanding of the procedure being performed  Patient consent: the patient's understanding of the procedure matches consent given  Procedure consent: procedure consent matches procedure scheduled  Relevant documents: relevant documents present and verified  Patient identity confirmed: verbally with patient      Number of Lesions: 1  Lesion 1:     Body area: trunk    Trunk location: back    Initial size (mm): 12    Final defect size (mm): 12    Malignancy: malignancy unknown      Destruction method: shave removal      Comments:   Informed consent obtained    Betadine and alcohol use   2 cc of lidocaine with epinephrine use  Shave excision done  Specimen sent to pathology  Electrodesiccation to the base  Bacitracin and dry sterile dressing applied

## 2021-09-03 ENCOUNTER — OFFICE VISIT (OUTPATIENT)
Dept: FAMILY MEDICINE CLINIC | Facility: CLINIC | Age: 56
End: 2021-09-03

## 2021-09-03 VITALS — SYSTOLIC BLOOD PRESSURE: 120 MMHG | DIASTOLIC BLOOD PRESSURE: 80 MMHG | BODY MASS INDEX: 40.72 KG/M2 | HEIGHT: 59 IN

## 2021-09-03 DIAGNOSIS — L82.1 SEBORRHEIC KERATOSIS: Primary | ICD-10-CM

## 2021-09-03 PROCEDURE — 99024 POSTOP FOLLOW-UP VISIT: CPT | Performed by: FAMILY MEDICINE

## 2021-09-03 NOTE — PROGRESS NOTES
Assessment/Plan:  Pathology report discussed with the patient  Sutures removed  Bacitracin and Band-Aids applied  Patient will follow-up as needed  Sunscreen recommended  Diagnoses and all orders for this visit:    Seborrheic keratosis            Subjective:        Patient ID: Jaimie Lakhani is a 60 y o  female  Patient is here to follow-up on biopsies  No significant pain discharge  The following portions of the patient's history were reviewed and updated as appropriate: allergies, current medications, past family history, past medical history, past social history, past surgical history and problem list       Review of Systems        Objective:               /80 (BP Location: Right arm, Patient Position: Sitting, Cuff Size: Standard)   Ht 4' 11" (1 499 m)   LMP 08/01/2000 (Approximate)   BMI 40 72 kg/m²          Physical Exam  Skin:     Comments: Biopsy sites healed  Well  Sutures removed

## 2021-09-13 ENCOUNTER — APPOINTMENT (OUTPATIENT)
Dept: RADIOLOGY | Facility: CLINIC | Age: 56
End: 2021-09-13
Payer: COMMERCIAL

## 2021-09-13 ENCOUNTER — OFFICE VISIT (OUTPATIENT)
Dept: PODIATRY | Facility: CLINIC | Age: 56
End: 2021-09-13
Payer: COMMERCIAL

## 2021-09-13 VITALS — SYSTOLIC BLOOD PRESSURE: 119 MMHG | HEART RATE: 75 BPM | DIASTOLIC BLOOD PRESSURE: 84 MMHG

## 2021-09-13 DIAGNOSIS — S92.901K NONUNION OF FRACTURE OF FOOT, RIGHT: ICD-10-CM

## 2021-09-13 DIAGNOSIS — S92.901K NONUNION OF FRACTURE OF FOOT, RIGHT: Primary | ICD-10-CM

## 2021-09-13 PROCEDURE — 99214 OFFICE O/P EST MOD 30 MIN: CPT | Performed by: PODIATRIST

## 2021-09-13 PROCEDURE — 73630 X-RAY EXAM OF FOOT: CPT

## 2021-09-13 NOTE — PROGRESS NOTES
This patient was seen on 9/13/21  My role is Foot , Ankle, and Wound Specialist    SUBJECTIVE    Chief Complaint:  Non-union fracture s/p ORIF 94 days prior     Patient ID: Lyudmila Romo is a 60 y o  female  She is s/p ORIF of 5th metatarsal nonunion  She still gets some achiness (4/10)  She notes pain prior to surgery was a 8/10  She's mostly NWB about 50% of the time, and always in the camboot  The following portions of the patient's history were reviewed and updated as appropriate: allergies, current medications, past family history, past medical history, past social history, past surgical history and problem list     Review of Systems   Constitutional: Negative  Negative for appetite change, chills, diaphoresis, fatigue, fever and unexpected weight change  Respiratory: Negative  Musculoskeletal: Negative for arthralgias and gait problem  OBJECTIVE      /84   Pulse 75   LMP 08/01/2000 (Approximate)     Foot/Ankle Musculoskeletal Exam    General      Neurological: alert      General additional comments: I note mild pain on palpation of the ORIF site  Minimal edema noted  Physical Exam  Vitals and nursing note reviewed  Constitutional:       General: She is not in acute distress  Appearance: Normal appearance  She is not ill-appearing, toxic-appearing or diaphoretic  Musculoskeletal:      Comments: I note mild pain on palpation of the ORIF site  Minimal edema noted  Neurological:      Mental Status: She is alert  ASSESSMENT     Diagnoses and all orders for this visit:    Nonunion of fracture of foot, right  -     Cancel: X-ray foot right 3+ views; Future  -     X-ray foot right 3+ views; Future         Problem List Items Addressed This Visit        Musculoskeletal and Integument    Nonunion of fracture of foot, right - Primary    Relevant Orders    X-ray foot right 3+ views          PLAN    Xrays ordered of the foot  I performed a wet read   No hardware failure noted  I note slow progressive consolidation of the fracture site  I told her to cease NWB  I feel micromotion might be helpful at the fracture site  She will ambulate as tolerate with camboot  Follow-up 1 month for evaluation and follow-up xrays

## 2021-09-14 ENCOUNTER — TELEPHONE (OUTPATIENT)
Dept: PAIN MEDICINE | Facility: MEDICAL CENTER | Age: 56
End: 2021-09-14

## 2021-10-18 ENCOUNTER — APPOINTMENT (OUTPATIENT)
Dept: RADIOLOGY | Facility: CLINIC | Age: 56
End: 2021-10-18
Payer: COMMERCIAL

## 2021-10-18 ENCOUNTER — OFFICE VISIT (OUTPATIENT)
Dept: PODIATRY | Facility: CLINIC | Age: 56
End: 2021-10-18
Payer: OTHER MISCELLANEOUS

## 2021-10-18 VITALS — HEART RATE: 69 BPM | SYSTOLIC BLOOD PRESSURE: 122 MMHG | DIASTOLIC BLOOD PRESSURE: 89 MMHG

## 2021-10-18 DIAGNOSIS — S92.354K CLOSED NONDISPLACED FRACTURE OF FIFTH METATARSAL BONE OF RIGHT FOOT WITH NONUNION: Primary | ICD-10-CM

## 2021-10-18 DIAGNOSIS — S92.354K CLOSED NONDISPLACED FRACTURE OF FIFTH METATARSAL BONE OF RIGHT FOOT WITH NONUNION: ICD-10-CM

## 2021-10-18 PROCEDURE — 73630 X-RAY EXAM OF FOOT: CPT

## 2021-10-18 PROCEDURE — 99212 OFFICE O/P EST SF 10 MIN: CPT | Performed by: PODIATRIST

## 2021-10-19 ENCOUNTER — TELEPHONE (OUTPATIENT)
Dept: PODIATRY | Facility: CLINIC | Age: 56
End: 2021-10-19

## 2021-10-21 ENCOUNTER — TELEPHONE (OUTPATIENT)
Dept: PODIATRY | Facility: CLINIC | Age: 56
End: 2021-10-21

## 2021-10-22 ENCOUNTER — EVALUATION (OUTPATIENT)
Dept: PHYSICAL THERAPY | Facility: MEDICAL CENTER | Age: 56
End: 2021-10-22
Payer: OTHER MISCELLANEOUS

## 2021-10-22 DIAGNOSIS — Z98.890 S/P ORIF (OPEN REDUCTION INTERNAL FIXATION) FRACTURE: Primary | ICD-10-CM

## 2021-10-22 DIAGNOSIS — S92.354K CLOSED NONDISPLACED FRACTURE OF FIFTH METATARSAL BONE OF RIGHT FOOT WITH NONUNION: ICD-10-CM

## 2021-10-22 DIAGNOSIS — Z87.81 S/P ORIF (OPEN REDUCTION INTERNAL FIXATION) FRACTURE: Primary | ICD-10-CM

## 2021-10-22 PROCEDURE — 97161 PT EVAL LOW COMPLEX 20 MIN: CPT | Performed by: PHYSICAL THERAPIST

## 2021-10-22 PROCEDURE — 97110 THERAPEUTIC EXERCISES: CPT | Performed by: PHYSICAL THERAPIST

## 2021-10-26 ENCOUNTER — OFFICE VISIT (OUTPATIENT)
Dept: PHYSICAL THERAPY | Facility: MEDICAL CENTER | Age: 56
End: 2021-10-26
Payer: OTHER MISCELLANEOUS

## 2021-10-26 DIAGNOSIS — Z98.890 S/P ORIF (OPEN REDUCTION INTERNAL FIXATION) FRACTURE: Primary | ICD-10-CM

## 2021-10-26 DIAGNOSIS — S92.354K CLOSED NONDISPLACED FRACTURE OF FIFTH METATARSAL BONE OF RIGHT FOOT WITH NONUNION: ICD-10-CM

## 2021-10-26 DIAGNOSIS — Z87.81 S/P ORIF (OPEN REDUCTION INTERNAL FIXATION) FRACTURE: Primary | ICD-10-CM

## 2021-10-26 PROCEDURE — 97110 THERAPEUTIC EXERCISES: CPT | Performed by: PHYSICAL THERAPIST

## 2021-10-26 PROCEDURE — 97140 MANUAL THERAPY 1/> REGIONS: CPT | Performed by: PHYSICAL THERAPIST

## 2021-10-29 ENCOUNTER — OFFICE VISIT (OUTPATIENT)
Dept: PHYSICAL THERAPY | Facility: MEDICAL CENTER | Age: 56
End: 2021-10-29
Payer: OTHER MISCELLANEOUS

## 2021-10-29 DIAGNOSIS — S92.354K CLOSED NONDISPLACED FRACTURE OF FIFTH METATARSAL BONE OF RIGHT FOOT WITH NONUNION: ICD-10-CM

## 2021-10-29 DIAGNOSIS — Z87.81 S/P ORIF (OPEN REDUCTION INTERNAL FIXATION) FRACTURE: Primary | ICD-10-CM

## 2021-10-29 DIAGNOSIS — Z98.890 S/P ORIF (OPEN REDUCTION INTERNAL FIXATION) FRACTURE: Primary | ICD-10-CM

## 2021-10-29 PROCEDURE — 97110 THERAPEUTIC EXERCISES: CPT | Performed by: PHYSICAL THERAPIST

## 2021-10-29 PROCEDURE — 97140 MANUAL THERAPY 1/> REGIONS: CPT | Performed by: PHYSICAL THERAPIST

## 2021-11-01 ENCOUNTER — OFFICE VISIT (OUTPATIENT)
Dept: PHYSICAL THERAPY | Facility: MEDICAL CENTER | Age: 56
End: 2021-11-01
Payer: OTHER MISCELLANEOUS

## 2021-11-01 DIAGNOSIS — Z98.890 S/P ORIF (OPEN REDUCTION INTERNAL FIXATION) FRACTURE: Primary | ICD-10-CM

## 2021-11-01 DIAGNOSIS — S92.354K CLOSED NONDISPLACED FRACTURE OF FIFTH METATARSAL BONE OF RIGHT FOOT WITH NONUNION: ICD-10-CM

## 2021-11-01 DIAGNOSIS — Z87.81 S/P ORIF (OPEN REDUCTION INTERNAL FIXATION) FRACTURE: Primary | ICD-10-CM

## 2021-11-01 PROCEDURE — 97140 MANUAL THERAPY 1/> REGIONS: CPT | Performed by: PHYSICAL THERAPIST

## 2021-11-01 PROCEDURE — 97110 THERAPEUTIC EXERCISES: CPT | Performed by: PHYSICAL THERAPIST

## 2021-11-04 ENCOUNTER — OFFICE VISIT (OUTPATIENT)
Dept: PHYSICAL THERAPY | Facility: MEDICAL CENTER | Age: 56
End: 2021-11-04
Payer: OTHER MISCELLANEOUS

## 2021-11-04 DIAGNOSIS — Z87.81 S/P ORIF (OPEN REDUCTION INTERNAL FIXATION) FRACTURE: Primary | ICD-10-CM

## 2021-11-04 DIAGNOSIS — Z98.890 S/P ORIF (OPEN REDUCTION INTERNAL FIXATION) FRACTURE: Primary | ICD-10-CM

## 2021-11-04 DIAGNOSIS — S92.354K CLOSED NONDISPLACED FRACTURE OF FIFTH METATARSAL BONE OF RIGHT FOOT WITH NONUNION: ICD-10-CM

## 2021-11-04 PROCEDURE — 97140 MANUAL THERAPY 1/> REGIONS: CPT | Performed by: PHYSICAL THERAPIST

## 2021-11-04 PROCEDURE — 97110 THERAPEUTIC EXERCISES: CPT | Performed by: PHYSICAL THERAPIST

## 2021-11-08 ENCOUNTER — OFFICE VISIT (OUTPATIENT)
Dept: PODIATRY | Facility: CLINIC | Age: 56
End: 2021-11-08
Payer: OTHER MISCELLANEOUS

## 2021-11-08 VITALS
WEIGHT: 201 LBS | HEART RATE: 73 BPM | SYSTOLIC BLOOD PRESSURE: 123 MMHG | BODY MASS INDEX: 40.6 KG/M2 | DIASTOLIC BLOOD PRESSURE: 79 MMHG

## 2021-11-08 DIAGNOSIS — Z09 POSTOP CHECK: Primary | ICD-10-CM

## 2021-11-08 PROCEDURE — 99213 OFFICE O/P EST LOW 20 MIN: CPT | Performed by: PODIATRIST

## 2021-11-09 ENCOUNTER — TELEPHONE (OUTPATIENT)
Dept: PODIATRY | Facility: CLINIC | Age: 56
End: 2021-11-09

## 2021-11-09 ENCOUNTER — OFFICE VISIT (OUTPATIENT)
Dept: PHYSICAL THERAPY | Facility: MEDICAL CENTER | Age: 56
End: 2021-11-09
Payer: OTHER MISCELLANEOUS

## 2021-11-09 DIAGNOSIS — Z98.890 S/P ORIF (OPEN REDUCTION INTERNAL FIXATION) FRACTURE: Primary | ICD-10-CM

## 2021-11-09 DIAGNOSIS — S92.354K CLOSED NONDISPLACED FRACTURE OF FIFTH METATARSAL BONE OF RIGHT FOOT WITH NONUNION: ICD-10-CM

## 2021-11-09 DIAGNOSIS — Z87.81 S/P ORIF (OPEN REDUCTION INTERNAL FIXATION) FRACTURE: Primary | ICD-10-CM

## 2021-11-09 PROCEDURE — 97140 MANUAL THERAPY 1/> REGIONS: CPT | Performed by: PHYSICAL THERAPIST

## 2021-11-09 PROCEDURE — 97110 THERAPEUTIC EXERCISES: CPT | Performed by: PHYSICAL THERAPIST

## 2021-11-11 ENCOUNTER — OFFICE VISIT (OUTPATIENT)
Dept: PHYSICAL THERAPY | Facility: MEDICAL CENTER | Age: 56
End: 2021-11-11
Payer: OTHER MISCELLANEOUS

## 2021-11-11 DIAGNOSIS — Z98.890 S/P ORIF (OPEN REDUCTION INTERNAL FIXATION) FRACTURE: Primary | ICD-10-CM

## 2021-11-11 DIAGNOSIS — Z87.81 S/P ORIF (OPEN REDUCTION INTERNAL FIXATION) FRACTURE: Primary | ICD-10-CM

## 2021-11-11 DIAGNOSIS — S92.354K CLOSED NONDISPLACED FRACTURE OF FIFTH METATARSAL BONE OF RIGHT FOOT WITH NONUNION: ICD-10-CM

## 2021-11-11 PROCEDURE — 97140 MANUAL THERAPY 1/> REGIONS: CPT | Performed by: PHYSICAL THERAPIST

## 2021-11-11 PROCEDURE — 97110 THERAPEUTIC EXERCISES: CPT | Performed by: PHYSICAL THERAPIST

## 2021-11-15 ENCOUNTER — OFFICE VISIT (OUTPATIENT)
Dept: PHYSICAL THERAPY | Facility: MEDICAL CENTER | Age: 56
End: 2021-11-15
Payer: OTHER MISCELLANEOUS

## 2021-11-15 DIAGNOSIS — S92.354K CLOSED NONDISPLACED FRACTURE OF FIFTH METATARSAL BONE OF RIGHT FOOT WITH NONUNION: ICD-10-CM

## 2021-11-15 DIAGNOSIS — Z87.81 S/P ORIF (OPEN REDUCTION INTERNAL FIXATION) FRACTURE: Primary | ICD-10-CM

## 2021-11-15 DIAGNOSIS — Z98.890 S/P ORIF (OPEN REDUCTION INTERNAL FIXATION) FRACTURE: Primary | ICD-10-CM

## 2021-11-15 PROCEDURE — 97140 MANUAL THERAPY 1/> REGIONS: CPT | Performed by: PHYSICAL THERAPIST

## 2021-11-15 PROCEDURE — 97110 THERAPEUTIC EXERCISES: CPT | Performed by: PHYSICAL THERAPIST

## 2021-11-18 ENCOUNTER — OFFICE VISIT (OUTPATIENT)
Dept: PHYSICAL THERAPY | Facility: MEDICAL CENTER | Age: 56
End: 2021-11-18
Payer: OTHER MISCELLANEOUS

## 2021-11-18 DIAGNOSIS — Z98.890 S/P ORIF (OPEN REDUCTION INTERNAL FIXATION) FRACTURE: Primary | ICD-10-CM

## 2021-11-18 DIAGNOSIS — S92.354K CLOSED NONDISPLACED FRACTURE OF FIFTH METATARSAL BONE OF RIGHT FOOT WITH NONUNION: ICD-10-CM

## 2021-11-18 DIAGNOSIS — Z87.81 S/P ORIF (OPEN REDUCTION INTERNAL FIXATION) FRACTURE: Primary | ICD-10-CM

## 2021-11-18 PROCEDURE — 97140 MANUAL THERAPY 1/> REGIONS: CPT | Performed by: PHYSICAL THERAPIST

## 2021-11-18 PROCEDURE — 97110 THERAPEUTIC EXERCISES: CPT | Performed by: PHYSICAL THERAPIST

## 2021-11-22 ENCOUNTER — OFFICE VISIT (OUTPATIENT)
Dept: PHYSICAL THERAPY | Facility: MEDICAL CENTER | Age: 56
End: 2021-11-22
Payer: OTHER MISCELLANEOUS

## 2021-11-22 DIAGNOSIS — Z98.890 S/P ORIF (OPEN REDUCTION INTERNAL FIXATION) FRACTURE: Primary | ICD-10-CM

## 2021-11-22 DIAGNOSIS — S92.354K CLOSED NONDISPLACED FRACTURE OF FIFTH METATARSAL BONE OF RIGHT FOOT WITH NONUNION: ICD-10-CM

## 2021-11-22 DIAGNOSIS — Z87.81 S/P ORIF (OPEN REDUCTION INTERNAL FIXATION) FRACTURE: Primary | ICD-10-CM

## 2021-11-22 PROCEDURE — 97110 THERAPEUTIC EXERCISES: CPT | Performed by: PHYSICAL THERAPIST

## 2021-11-22 PROCEDURE — 97140 MANUAL THERAPY 1/> REGIONS: CPT | Performed by: PHYSICAL THERAPIST

## 2021-11-26 ENCOUNTER — OFFICE VISIT (OUTPATIENT)
Dept: PHYSICAL THERAPY | Facility: MEDICAL CENTER | Age: 56
End: 2021-11-26
Payer: OTHER MISCELLANEOUS

## 2021-11-26 DIAGNOSIS — Z98.890 S/P ORIF (OPEN REDUCTION INTERNAL FIXATION) FRACTURE: Primary | ICD-10-CM

## 2021-11-26 DIAGNOSIS — Z87.81 S/P ORIF (OPEN REDUCTION INTERNAL FIXATION) FRACTURE: Primary | ICD-10-CM

## 2021-11-26 DIAGNOSIS — S92.354K CLOSED NONDISPLACED FRACTURE OF FIFTH METATARSAL BONE OF RIGHT FOOT WITH NONUNION: ICD-10-CM

## 2021-11-26 PROCEDURE — 97140 MANUAL THERAPY 1/> REGIONS: CPT | Performed by: PHYSICAL THERAPIST

## 2021-11-26 PROCEDURE — 97110 THERAPEUTIC EXERCISES: CPT | Performed by: PHYSICAL THERAPIST

## 2021-11-29 ENCOUNTER — OFFICE VISIT (OUTPATIENT)
Dept: PHYSICAL THERAPY | Facility: MEDICAL CENTER | Age: 56
End: 2021-11-29
Payer: OTHER MISCELLANEOUS

## 2021-11-29 DIAGNOSIS — Z98.890 S/P ORIF (OPEN REDUCTION INTERNAL FIXATION) FRACTURE: Primary | ICD-10-CM

## 2021-11-29 DIAGNOSIS — Z87.81 S/P ORIF (OPEN REDUCTION INTERNAL FIXATION) FRACTURE: Primary | ICD-10-CM

## 2021-11-29 DIAGNOSIS — S92.354K CLOSED NONDISPLACED FRACTURE OF FIFTH METATARSAL BONE OF RIGHT FOOT WITH NONUNION: ICD-10-CM

## 2021-11-29 PROCEDURE — 97110 THERAPEUTIC EXERCISES: CPT | Performed by: PHYSICAL THERAPIST

## 2021-11-29 PROCEDURE — 97140 MANUAL THERAPY 1/> REGIONS: CPT | Performed by: PHYSICAL THERAPIST

## 2021-12-02 ENCOUNTER — OFFICE VISIT (OUTPATIENT)
Dept: PHYSICAL THERAPY | Facility: MEDICAL CENTER | Age: 56
End: 2021-12-02
Payer: OTHER MISCELLANEOUS

## 2021-12-02 DIAGNOSIS — Z98.890 S/P ORIF (OPEN REDUCTION INTERNAL FIXATION) FRACTURE: Primary | ICD-10-CM

## 2021-12-02 DIAGNOSIS — Z87.81 S/P ORIF (OPEN REDUCTION INTERNAL FIXATION) FRACTURE: Primary | ICD-10-CM

## 2021-12-02 DIAGNOSIS — S92.354K CLOSED NONDISPLACED FRACTURE OF FIFTH METATARSAL BONE OF RIGHT FOOT WITH NONUNION: ICD-10-CM

## 2021-12-02 PROCEDURE — 97110 THERAPEUTIC EXERCISES: CPT | Performed by: PHYSICAL THERAPIST

## 2021-12-02 PROCEDURE — 97112 NEUROMUSCULAR REEDUCATION: CPT | Performed by: PHYSICAL THERAPIST

## 2021-12-02 PROCEDURE — 97140 MANUAL THERAPY 1/> REGIONS: CPT | Performed by: PHYSICAL THERAPIST

## 2021-12-06 ENCOUNTER — OFFICE VISIT (OUTPATIENT)
Dept: PHYSICAL THERAPY | Facility: MEDICAL CENTER | Age: 56
End: 2021-12-06
Payer: OTHER MISCELLANEOUS

## 2021-12-06 DIAGNOSIS — S92.354K CLOSED NONDISPLACED FRACTURE OF FIFTH METATARSAL BONE OF RIGHT FOOT WITH NONUNION: ICD-10-CM

## 2021-12-06 DIAGNOSIS — Z87.81 S/P ORIF (OPEN REDUCTION INTERNAL FIXATION) FRACTURE: Primary | ICD-10-CM

## 2021-12-06 DIAGNOSIS — Z98.890 S/P ORIF (OPEN REDUCTION INTERNAL FIXATION) FRACTURE: Primary | ICD-10-CM

## 2021-12-06 PROCEDURE — 97110 THERAPEUTIC EXERCISES: CPT | Performed by: PHYSICAL THERAPIST

## 2021-12-06 PROCEDURE — 97112 NEUROMUSCULAR REEDUCATION: CPT | Performed by: PHYSICAL THERAPIST

## 2021-12-06 PROCEDURE — 97140 MANUAL THERAPY 1/> REGIONS: CPT | Performed by: PHYSICAL THERAPIST

## 2021-12-09 ENCOUNTER — OFFICE VISIT (OUTPATIENT)
Dept: PHYSICAL THERAPY | Facility: MEDICAL CENTER | Age: 56
End: 2021-12-09
Payer: OTHER MISCELLANEOUS

## 2021-12-09 DIAGNOSIS — Z87.81 S/P ORIF (OPEN REDUCTION INTERNAL FIXATION) FRACTURE: Primary | ICD-10-CM

## 2021-12-09 DIAGNOSIS — Z98.890 S/P ORIF (OPEN REDUCTION INTERNAL FIXATION) FRACTURE: Primary | ICD-10-CM

## 2021-12-09 DIAGNOSIS — S92.354K CLOSED NONDISPLACED FRACTURE OF FIFTH METATARSAL BONE OF RIGHT FOOT WITH NONUNION: ICD-10-CM

## 2021-12-09 PROCEDURE — 97140 MANUAL THERAPY 1/> REGIONS: CPT | Performed by: PHYSICAL THERAPIST

## 2021-12-09 PROCEDURE — 97112 NEUROMUSCULAR REEDUCATION: CPT | Performed by: PHYSICAL THERAPIST

## 2021-12-09 PROCEDURE — 97110 THERAPEUTIC EXERCISES: CPT | Performed by: PHYSICAL THERAPIST

## 2021-12-13 ENCOUNTER — OFFICE VISIT (OUTPATIENT)
Dept: PHYSICAL THERAPY | Facility: MEDICAL CENTER | Age: 56
End: 2021-12-13
Payer: OTHER MISCELLANEOUS

## 2021-12-13 DIAGNOSIS — Z98.890 S/P ORIF (OPEN REDUCTION INTERNAL FIXATION) FRACTURE: Primary | ICD-10-CM

## 2021-12-13 DIAGNOSIS — Z87.81 S/P ORIF (OPEN REDUCTION INTERNAL FIXATION) FRACTURE: Primary | ICD-10-CM

## 2021-12-13 DIAGNOSIS — S92.354K CLOSED NONDISPLACED FRACTURE OF FIFTH METATARSAL BONE OF RIGHT FOOT WITH NONUNION: ICD-10-CM

## 2021-12-13 PROCEDURE — 97140 MANUAL THERAPY 1/> REGIONS: CPT | Performed by: PHYSICAL THERAPIST

## 2021-12-13 PROCEDURE — 97112 NEUROMUSCULAR REEDUCATION: CPT | Performed by: PHYSICAL THERAPIST

## 2021-12-13 PROCEDURE — 97110 THERAPEUTIC EXERCISES: CPT | Performed by: PHYSICAL THERAPIST

## 2021-12-16 ENCOUNTER — OFFICE VISIT (OUTPATIENT)
Dept: PHYSICAL THERAPY | Facility: MEDICAL CENTER | Age: 56
End: 2021-12-16
Payer: OTHER MISCELLANEOUS

## 2021-12-16 DIAGNOSIS — Z98.890 S/P ORIF (OPEN REDUCTION INTERNAL FIXATION) FRACTURE: Primary | ICD-10-CM

## 2021-12-16 DIAGNOSIS — S92.354K CLOSED NONDISPLACED FRACTURE OF FIFTH METATARSAL BONE OF RIGHT FOOT WITH NONUNION: ICD-10-CM

## 2021-12-16 DIAGNOSIS — Z87.81 S/P ORIF (OPEN REDUCTION INTERNAL FIXATION) FRACTURE: Primary | ICD-10-CM

## 2021-12-16 PROCEDURE — 97110 THERAPEUTIC EXERCISES: CPT | Performed by: PHYSICAL THERAPIST

## 2021-12-16 PROCEDURE — 97140 MANUAL THERAPY 1/> REGIONS: CPT | Performed by: PHYSICAL THERAPIST

## 2021-12-16 PROCEDURE — 97112 NEUROMUSCULAR REEDUCATION: CPT | Performed by: PHYSICAL THERAPIST

## 2021-12-20 ENCOUNTER — EVALUATION (OUTPATIENT)
Dept: PHYSICAL THERAPY | Facility: MEDICAL CENTER | Age: 56
End: 2021-12-20
Payer: OTHER MISCELLANEOUS

## 2021-12-20 DIAGNOSIS — S92.354K CLOSED NONDISPLACED FRACTURE OF FIFTH METATARSAL BONE OF RIGHT FOOT WITH NONUNION: ICD-10-CM

## 2021-12-20 DIAGNOSIS — Z98.890 S/P ORIF (OPEN REDUCTION INTERNAL FIXATION) FRACTURE: Primary | ICD-10-CM

## 2021-12-20 DIAGNOSIS — Z87.81 S/P ORIF (OPEN REDUCTION INTERNAL FIXATION) FRACTURE: Primary | ICD-10-CM

## 2021-12-20 PROCEDURE — 97110 THERAPEUTIC EXERCISES: CPT | Performed by: PHYSICAL THERAPIST

## 2021-12-20 PROCEDURE — 97140 MANUAL THERAPY 1/> REGIONS: CPT | Performed by: PHYSICAL THERAPIST

## 2021-12-20 PROCEDURE — 97112 NEUROMUSCULAR REEDUCATION: CPT | Performed by: PHYSICAL THERAPIST

## 2021-12-22 ENCOUNTER — APPOINTMENT (OUTPATIENT)
Dept: LAB | Facility: HOSPITAL | Age: 56
End: 2021-12-22
Payer: COMMERCIAL

## 2021-12-22 DIAGNOSIS — G40.909 SEIZURE DISORDER (HCC): ICD-10-CM

## 2021-12-22 PROCEDURE — 36415 COLL VENOUS BLD VENIPUNCTURE: CPT

## 2021-12-22 PROCEDURE — 80201 ASSAY OF TOPIRAMATE: CPT

## 2021-12-23 ENCOUNTER — APPOINTMENT (OUTPATIENT)
Dept: PHYSICAL THERAPY | Facility: MEDICAL CENTER | Age: 56
End: 2021-12-23
Payer: OTHER MISCELLANEOUS

## 2021-12-24 LAB — TOPIRAMATE SERPL-MCNC: 12.9 UG/ML (ref 2–25)

## 2021-12-27 ENCOUNTER — OFFICE VISIT (OUTPATIENT)
Dept: PHYSICAL THERAPY | Facility: MEDICAL CENTER | Age: 56
End: 2021-12-27
Payer: OTHER MISCELLANEOUS

## 2021-12-27 DIAGNOSIS — Z98.890 S/P ORIF (OPEN REDUCTION INTERNAL FIXATION) FRACTURE: Primary | ICD-10-CM

## 2021-12-27 DIAGNOSIS — Z87.81 S/P ORIF (OPEN REDUCTION INTERNAL FIXATION) FRACTURE: Primary | ICD-10-CM

## 2021-12-27 DIAGNOSIS — S92.354K CLOSED NONDISPLACED FRACTURE OF FIFTH METATARSAL BONE OF RIGHT FOOT WITH NONUNION: ICD-10-CM

## 2021-12-27 PROCEDURE — 97112 NEUROMUSCULAR REEDUCATION: CPT | Performed by: PHYSICAL THERAPY ASSISTANT

## 2021-12-27 PROCEDURE — 97110 THERAPEUTIC EXERCISES: CPT | Performed by: PHYSICAL THERAPY ASSISTANT

## 2021-12-27 PROCEDURE — 97140 MANUAL THERAPY 1/> REGIONS: CPT | Performed by: PHYSICAL THERAPY ASSISTANT

## 2021-12-30 ENCOUNTER — OFFICE VISIT (OUTPATIENT)
Dept: PHYSICAL THERAPY | Facility: MEDICAL CENTER | Age: 56
End: 2021-12-30
Payer: OTHER MISCELLANEOUS

## 2021-12-30 DIAGNOSIS — S92.354K CLOSED NONDISPLACED FRACTURE OF FIFTH METATARSAL BONE OF RIGHT FOOT WITH NONUNION: ICD-10-CM

## 2021-12-30 DIAGNOSIS — Z87.81 S/P ORIF (OPEN REDUCTION INTERNAL FIXATION) FRACTURE: Primary | ICD-10-CM

## 2021-12-30 DIAGNOSIS — Z98.890 S/P ORIF (OPEN REDUCTION INTERNAL FIXATION) FRACTURE: Primary | ICD-10-CM

## 2021-12-30 PROCEDURE — 97110 THERAPEUTIC EXERCISES: CPT | Performed by: PHYSICAL THERAPIST

## 2021-12-30 PROCEDURE — 97112 NEUROMUSCULAR REEDUCATION: CPT | Performed by: PHYSICAL THERAPIST

## 2021-12-30 PROCEDURE — 97140 MANUAL THERAPY 1/> REGIONS: CPT | Performed by: PHYSICAL THERAPIST

## 2022-01-03 ENCOUNTER — TELEPHONE (OUTPATIENT)
Dept: FAMILY MEDICINE CLINIC | Facility: CLINIC | Age: 57
End: 2022-01-03

## 2022-01-03 ENCOUNTER — OFFICE VISIT (OUTPATIENT)
Dept: PHYSICAL THERAPY | Facility: MEDICAL CENTER | Age: 57
End: 2022-01-03
Payer: OTHER MISCELLANEOUS

## 2022-01-03 DIAGNOSIS — Z87.81 S/P ORIF (OPEN REDUCTION INTERNAL FIXATION) FRACTURE: Primary | ICD-10-CM

## 2022-01-03 DIAGNOSIS — S92.354K CLOSED NONDISPLACED FRACTURE OF FIFTH METATARSAL BONE OF RIGHT FOOT WITH NONUNION: ICD-10-CM

## 2022-01-03 DIAGNOSIS — Z98.890 S/P ORIF (OPEN REDUCTION INTERNAL FIXATION) FRACTURE: Primary | ICD-10-CM

## 2022-01-03 PROCEDURE — 97112 NEUROMUSCULAR REEDUCATION: CPT | Performed by: PHYSICAL THERAPIST

## 2022-01-03 PROCEDURE — 97110 THERAPEUTIC EXERCISES: CPT | Performed by: PHYSICAL THERAPIST

## 2022-01-03 PROCEDURE — 97140 MANUAL THERAPY 1/> REGIONS: CPT | Performed by: PHYSICAL THERAPIST

## 2022-01-03 NOTE — PROGRESS NOTES
Daily Note     Today's date: 1/3/2022  Patient name: Armida Robertson  : 1965  MRN: [id number]  Referring provider: Kika Capps DPM  Dx:   Encounter Diagnosis     ICD-10-CM    1  S/P ORIF (open reduction internal fixation) fracture  Z98 [id number]     Z87 81    2  Closed nondisplaced fracture of fifth metatarsal bone of right foot with nonunion  [id number]                   Subjective: Patient reports that she is still having difficulty with stair climbing  She reports that her L knee is painful when she is going up and down the stairs  Objective: See treatment diary below      Assessment: Patient continues to demonstrate good progress with improving R ankle AROM and PROM in all planes  Patient was able to perform Romberg and tandem stance today without UE assist, which demonstrates an improvement in balance  CGA provided to ensure safety throughout Romberg and tandem stance  Patient is also demonstrating improvements in CKC ankle stability as evidenced by progressions in reps for step ups and resistance for leg press  Patient would benefit from continued PT to further improve ankle strength to restore ability to negotiate stairs  Plan: Continue per plan of care        Precautions: s/p R 5th metatarsal ORIF (DOS: 21)- WBAT (NO CAM BOOT), hx of seizures, hx of DVT (x2), hx of lumbar fracture (L1-L4), hx of L shoulder arthroscopy      Manuals 11/22 11/26 11/29 12/2 12/6 12/13 12/16 12/20 12/27 12/30 1/3   R ankle PROM [facility]   R 1st MTP EXT PROM [facility]                               Neuro Re-Ed              Romberg     x5 EC to fatigue x5 EC to fatigue  x5 EC to fatigue x5 EC to fatigue x5 EC to fatigue x5 EC to fatigue x5 EC to fatigue (CGA)   Tandem    10"x5 ea b/l x5 ea to fatigue x5 ea to fatigue x5 ea to fatigue x5 ea to fatigue x5 ea to fatigue x5 ea to fatigue x5 ea to fatigue (CGA)   SLS    NV NV 3xto fatigue finger touch A NV 3x to fatigue finger touchA 3x to fatigue finger touch A 5x to fatigue finger touch A 5x to fatigue finger touch A   Weight shifts    HEP education HEP HEP HEP HEP Hep   HEP HEP                                             Ther Ex              Rec bike EUCEDA 5 min 5 min 5 min 5 min 5 min 5 min 5 min 5 min 5 min 5 min 5 min   Strap gastroc stretch HEP HEP HEP HEP HEP HEP 30"x4 HEP hep HEP HEP   Ankle DF/PF AROM 5"x30 ea blue 5"x30 ea blue 5"x30 ea blue 5"x30 ea black HEP HEP HEP HEP hep HEP HEP   Ankle INV/EV AROM 5"x30 ea blue 5" x30 ea blue 5"x30 ea blue 5"x30 ea black HEP HEP HEP HEP hep HEP HEP   Seated heel slides 10"x10 10"x10 10"x10 np          Seated heel raises 20 stand 20x stand 20x stand 20x stand 20x stand 20x stand 20x stand 25x stand 25x stand 30x stand 30x stand   Standing toe raises 20 20x 20x 20x 20x 20x 20x 25x 25x 30x 30x   BAPS board 3"x30 DF/PF L3 5# 3"x30 DF/PF L3 5# 3"x30 DF/PF 5# 3"x10 DF/PF 10# 3"x20 DF/PF 10# 3"x20 DF/PF 10# L3 3"x20 DF/PF 10# L3 3"x30 DF/PF 10# L3 3"x30 DF/PF 10# L3 np np   Towel intrinsic scoops 5"x30 5"x30 5"x30 HEP HEP HEP HEP HEP hep HEP HEP   Mini squats 5 5 5 20 20 25 25 30 30x 30x 30x   Standing heel-toe rock              Wall gastroc stretch 30"x4 30"x4 30"x4 HEP HEP HEP HEP HEP hep HEP HEP   Step ups        15 no riser, R HR x15 no riser x12 L1 x20 L2   Leg press          2x10 DL 50# x20 DL 60#   HEP education and instruction              Ther Activity                                          Gait Training              Step up and down (step to)                            Modalities

## 2022-01-03 NOTE — TELEPHONE ENCOUNTER
Patient called in stating she want somewhere yesterday and she had potatoes and eggs that were not cooked very well  Patient states shortly after she started to have diarrhea and was unable to eat anything and today when she ate she was in the bathroom right away and her bowel is loose and green and patient is wondering if how quick food poisoning will start

## 2022-01-05 ENCOUNTER — TELEMEDICINE (OUTPATIENT)
Dept: FAMILY MEDICINE CLINIC | Facility: CLINIC | Age: 57
End: 2022-01-05
Payer: COMMERCIAL

## 2022-01-05 VITALS — TEMPERATURE: 97.5 F

## 2022-01-05 DIAGNOSIS — Z20.822 EXPOSURE TO COVID-19 VIRUS: Primary | ICD-10-CM

## 2022-01-05 PROCEDURE — U0005 INFEC AGEN DETEC AMPLI PROBE: HCPCS | Performed by: FAMILY MEDICINE

## 2022-01-05 PROCEDURE — U0003 INFECTIOUS AGENT DETECTION BY NUCLEIC ACID (DNA OR RNA); SEVERE ACUTE RESPIRATORY SYNDROME CORONAVIRUS 2 (SARS-COV-2) (CORONAVIRUS DISEASE [COVID-19]), AMPLIFIED PROBE TECHNIQUE, MAKING USE OF HIGH THROUGHPUT TECHNOLOGIES AS DESCRIBED BY CMS-2020-01-R: HCPCS | Performed by: FAMILY MEDICINE

## 2022-01-05 PROCEDURE — 99213 OFFICE O/P EST LOW 20 MIN: CPT | Performed by: FAMILY MEDICINE
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
## 2022-01-06 ENCOUNTER — APPOINTMENT (OUTPATIENT)
Dept: PHYSICAL THERAPY | Facility: MEDICAL CENTER | Age: 57
End: 2022-01-06
Payer: OTHER MISCELLANEOUS

## 2022-01-10 ENCOUNTER — OFFICE VISIT (OUTPATIENT)
Dept: PODIATRY | Facility: CLINIC | Age: 57
End: 2022-01-10
Payer: OTHER MISCELLANEOUS

## 2022-01-10 ENCOUNTER — OFFICE VISIT (OUTPATIENT)
Dept: PHYSICAL THERAPY | Facility: MEDICAL CENTER | Age: 57
End: 2022-01-10
Payer: OTHER MISCELLANEOUS

## 2022-01-10 VITALS
HEART RATE: 70 BPM | WEIGHT: 205 LBS | BODY MASS INDEX: 41.4 KG/M2 | SYSTOLIC BLOOD PRESSURE: 118 MMHG | DIASTOLIC BLOOD PRESSURE: 79 MMHG

## 2022-01-10 DIAGNOSIS — J01.00 ACUTE NON-RECURRENT MAXILLARY SINUSITIS: Primary | ICD-10-CM

## 2022-01-10 DIAGNOSIS — S92.901K NONUNION OF FRACTURE OF FOOT, RIGHT: Primary | ICD-10-CM

## 2022-01-10 DIAGNOSIS — S92.354K CLOSED NONDISPLACED FRACTURE OF FIFTH METATARSAL BONE OF RIGHT FOOT WITH NONUNION: ICD-10-CM

## 2022-01-10 DIAGNOSIS — Z87.81 S/P ORIF (OPEN REDUCTION INTERNAL FIXATION) FRACTURE: Primary | ICD-10-CM

## 2022-01-10 DIAGNOSIS — Z98.890 S/P ORIF (OPEN REDUCTION INTERNAL FIXATION) FRACTURE: Primary | ICD-10-CM

## 2022-01-10 PROCEDURE — 97112 NEUROMUSCULAR REEDUCATION: CPT | Performed by: PHYSICAL THERAPIST

## 2022-01-10 PROCEDURE — 97110 THERAPEUTIC EXERCISES: CPT | Performed by: PHYSICAL THERAPIST

## 2022-01-10 PROCEDURE — 97140 MANUAL THERAPY 1/> REGIONS: CPT | Performed by: PHYSICAL THERAPIST

## 2022-01-10 PROCEDURE — 99213 OFFICE O/P EST LOW 20 MIN: CPT | Performed by: PODIATRIST

## 2022-01-10 RX ORDER — DOXYCYCLINE HYCLATE 100 MG/1
100 CAPSULE ORAL EVERY 12 HOURS SCHEDULED
Qty: 20 CAPSULE | Refills: 0 | Status: SHIPPED | OUTPATIENT
Start: 2022-01-10 | End: 2022-01-20

## 2022-01-10 RX ORDER — DOXYCYCLINE HYCLATE 100 MG/1
100 CAPSULE ORAL EVERY 12 HOURS SCHEDULED
Qty: 20 CAPSULE | Refills: 0 | Status: CANCELLED | OUTPATIENT
Start: 2022-01-10 | End: 2022-01-20

## 2022-01-10 NOTE — PROGRESS NOTES
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  S92 777K                   Subjective: Patient reports that she saw her physician earlier today, and she reports that he was pleased with progress  Patient reports that she would like to continue to work on her ability to climb stairs  Her next follow-up with her physician is 22  Objective: See treatment diary below      Assessment: Patient continues to demonstrate good progress with improving R ankle DF AROM and PROM, which is improving her quality of gait mechanics  Patient is also consistently demonstrating improved control and stability during balance interventions  Patient reported R buttock/R hip discomfort throughout session  Patient was educated to perform WB through R foot to minimize R hip compensation during CKC strengthening and balance interventions  Patient demonstrated fatigue at end of session  Patient would benefit from continued PT to improve CKC foot and ankle strength to be able to negotiate stairs  Plan: Continue per plan of care        Precautions: s/p R 5th metatarsal ORIF (DOS: 21)- WBAT (NO CAM BOOT), hx of seizures, hx of DVT (x2), hx of lumbar fracture (L1-L4), hx of L shoulder arthroscopy      Manuals  12/2 12/6 12/13 12/16 12/20 12/27 12/30 1/3 1/10   R ankle PROM [facility] KP KP RK [facility] KP   R 1st MTP EXT PROM [facility] KP                                 Neuro Re-Ed               Romberg     x5 EC to fatigue x5 EC to fatigue  x5 EC to fatigue x5 EC to fatigue x5 EC to fatigue x5 EC to fatigue x5 EC to fatigue (CGA) x5 EC to fatigue (CGA)   Tandem    10"x5 ea b/l x5 ea to fatigue x5 ea to fatigue x5 ea to fatigue x5 ea to fatigue x5 ea to fatigue x5 ea to fatigue x5 ea to fatigue (CGA) x5 ea to fatigue (CGA)   SLS    NV NV 3xto fatigue finger touch A NV 3x to fatigue finger touchA 3x to fatigue finger touch A 5x to fatigue finger touch A 5x to fatigue finger touch A 3x to fatigue no assist   Weight shifts    HEP education HEP HEP HEP HEP Hep   HEP HEP HEP                                                Ther Ex               Rec bike EUCEDA 5 min 5 min 5 min 5 min 5 min 5 min 5 min 5 min 5 min 5 min 5 min 5 min   Strap gastroc stretch HEP HEP HEP HEP HEP HEP 30"x4 HEP hep HEP HEP    Ankle DF/PF AROM 5"x30 ea blue 5"x30 ea blue 5"x30 ea blue 5"x30 ea black HEP HEP HEP HEP hep HEP HEP    Ankle INV/EV AROM 5"x30 ea blue 5" x30 ea blue 5"x30 ea blue 5"x30 ea black HEP HEP HEP HEP hep HEP HEP    Seated heel slides 10"x10 10"x10 10"x10 np           Seated heel raises 20 stand 20x stand 20x stand 20x stand 20x stand 20x stand 20x stand 25x stand 25x stand 30x stand 30x stand 30x stand   Standing toe raises 20 20x 20x 20x 20x 20x 20x 25x 25x 30x 30x 30x   BAPS board 3"x30 DF/PF L3 5# 3"x30 DF/PF L3 5# 3"x30 DF/PF 5# 3"x10 DF/PF 10# 3"x20 DF/PF 10# 3"x20 DF/PF 10# L3 3"x20 DF/PF 10# L3 3"x30 DF/PF 10# L3 3"x30 DF/PF 10# L3 np np    Towel intrinsic scoops 5"x30 5"x30 5"x30 HEP HEP HEP HEP HEP hep HEP HEP    Mini squats 5 5 5 20 20 25 25 30 30x 30x 30x 30x   Standing heel-toe rock               Wall gastroc stretch 30"x4 30"x4 30"x4 HEP HEP HEP HEP HEP hep HEP HEP    Step ups        15 no riser, R HR x15 no riser x12 L1 x20 L2 x20 L2   Leg press          2x10 DL 50# x20 DL 60# x20 DL 60#, SL NV   HEP education and instruction               Ther Activity                                             Gait Training               Step up and down (step to)                              Modalities

## 2022-01-10 NOTE — TELEPHONE ENCOUNTER
Call patient  Would recommend doxycycline 100 mg twice daily with food    Dispense 20 tablets with no refills

## 2022-01-10 NOTE — TELEPHONE ENCOUNTER
Patient called in stating her covid test was negative and is wondering what to do next and if she need blood work done and if she should need antibiotics

## 2022-01-11 NOTE — PROGRESS NOTES
This patient was seen on 1/10/22  My role is Foot , Ankle, and Wound Specialist    SUBJECTIVE    Chief Complaint:  S/P ORIF of nonunion fracture     Patient ID: Ar Daniels is a 60 y o  female  Iaineverardo Elizondo is here for follow-up  She notes about 70% reduction in foot pain since her last visit  She's working desk duty  She notes going up and down steps is here biggest issue now  She's attending PT and feels it's very helpful  She is wearing normal shoegear  The following portions of the patient's history were reviewed and updated as appropriate: allergies, current medications, past family history, past medical history, past social history, past surgical history and problem list     Review of Systems   Constitutional: Negative  Negative for appetite change, chills, diaphoresis, fatigue, fever and unexpected weight change  Respiratory: Negative  Musculoskeletal: Positive for arthralgias and gait problem  Negative for myalgias  OBJECTIVE      /79   Pulse 70   Wt 93 kg (205 lb)   LMP 08/01/2000 (Approximate)   BMI 41 40 kg/m²     Foot/Ankle Musculoskeletal Exam    General      Neurological: alert      General additional comments: I note minimal pain on palpation of the ORIF site  Minimal edema noted  I note muscle function of the anterior, posterior, evertor and invertor muscle groups of the lower leg are intact and normal  I note ROM of the ankle joint, subtalar joint, Choparts and Lisfranc's joint complexes and metatarsophalangeal joints are WNL without crepitus nor restrictions bilaterally  Physical Exam  Vitals and nursing note reviewed  Constitutional:       General: She is not in acute distress  Appearance: Normal appearance  She is not ill-appearing, toxic-appearing or diaphoretic  Musculoskeletal:      Comments: I note minimal pain on palpation of the ORIF site  Minimal edema noted    I note muscle function of the anterior, posterior, evertor and invertor muscle groups of the lower leg are intact and normal  I note ROM of the ankle joint, subtalar joint, Choparts and Lisfranc's joint complexes and metatarsophalangeal joints are WNL without crepitus nor restrictions bilaterally  Neurological:      Mental Status: She is alert  ASSESSMENT     Diagnoses and all orders for this visit:    Nonunion of fracture of foot, right         Problem List Items Addressed This Visit        Musculoskeletal and Integument    Nonunion of fracture of foot, right - Primary          PLAN    I will keep her on desk duty for 30 days  She will continue PT  They will concentrate on stairs  I will re-evaluate her in 30 days

## 2022-01-13 ENCOUNTER — HOSPITAL ENCOUNTER (OUTPATIENT)
Dept: BONE DENSITY | Facility: MEDICAL CENTER | Age: 57
Discharge: HOME | End: 2022-01-13
Payer: COMMERCIAL

## 2022-01-13 ENCOUNTER — HOSPITAL ENCOUNTER (OUTPATIENT)
Dept: MAMMOGRAPHY | Facility: MEDICAL CENTER | Age: 57
Discharge: HOME | End: 2022-01-13
Payer: COMMERCIAL

## 2022-01-13 ENCOUNTER — OFFICE VISIT (OUTPATIENT)
Dept: PHYSICAL THERAPY | Facility: MEDICAL CENTER | Age: 57
End: 2022-01-13
Payer: OTHER MISCELLANEOUS

## 2022-01-13 VITALS — WEIGHT: 205.03 LBS | HEIGHT: 59 IN | BODY MASS INDEX: 41.33 KG/M2

## 2022-01-13 DIAGNOSIS — Z98.890 S/P ORIF (OPEN REDUCTION INTERNAL FIXATION) FRACTURE: Primary | ICD-10-CM

## 2022-01-13 DIAGNOSIS — Z87.81 S/P ORIF (OPEN REDUCTION INTERNAL FIXATION) FRACTURE: Primary | ICD-10-CM

## 2022-01-13 DIAGNOSIS — S92.354A CLOSED NONDISPLACED FRACTURE OF FIFTH METATARSAL BONE OF RIGHT FOOT, INITIAL ENCOUNTER: ICD-10-CM

## 2022-01-13 DIAGNOSIS — S92.354K CLOSED NONDISPLACED FRACTURE OF FIFTH METATARSAL BONE OF RIGHT FOOT WITH NONUNION: ICD-10-CM

## 2022-01-13 DIAGNOSIS — Z12.31 ENCOUNTER FOR SCREENING MAMMOGRAM FOR MALIGNANT NEOPLASM OF BREAST: ICD-10-CM

## 2022-01-13 PROCEDURE — 97110 THERAPEUTIC EXERCISES: CPT | Performed by: PHYSICAL THERAPIST

## 2022-01-13 PROCEDURE — 77067 SCR MAMMO BI INCL CAD: CPT

## 2022-01-13 PROCEDURE — 77080 DXA BONE DENSITY AXIAL: CPT

## 2022-01-13 PROCEDURE — 77063 BREAST TOMOSYNTHESIS BI: CPT

## 2022-01-13 PROCEDURE — 97112 NEUROMUSCULAR REEDUCATION: CPT | Performed by: PHYSICAL THERAPIST

## 2022-01-13 PROCEDURE — 97140 MANUAL THERAPY 1/> REGIONS: CPT | Performed by: PHYSICAL THERAPIST

## 2022-01-13 NOTE — PROGRESS NOTES
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  S92 061K                   Subjective: Patient reports that her foot is feeling better, but she is still having difficulty going up and down the stairs  Objective: See treatment diary below      Assessment: Patient continues to demonstrate steady progress with improving R ankle AROM and PROM in all planes  Patient continues to demonstrate improved control during balance interventions but is challenged when removing UE assist  Decreased reps of tandem stance today and performed intervention with use of visual input to focus on increasing duration of hold time  Able to progress resistance for leg press, which further demonstrates good progress toward goals  Patient would benefit from continued PT to progress ankle strengthening and balance as tolerated to improve stair negotiation and facilitate a full return to job duties  Plan: Continue per plan of care        Precautions: s/p R 5th metatarsal ORIF (DOS: 21)- WBAT (NO CAM BOOT), hx of seizures, hx of DVT (x2), hx of lumbar fracture (L1-L4), hx of L shoulder arthroscopy      Manuals 1/13 11/22 11/26 11/29 12/2 12/6 12/13 12/16 12/20 12/27 12/30 1/3 1/10   R ankle PROM [facility] KP KP KP RK [facility] KP   R 1st MTP EXT PROM [facility] KP KP KP  RK [facility] KP                                   Neuro Re-Ed                Romberg x5 EC to fatigue (CGA)     x5 EC to fatigue x5 EC to fatigue  x5 EC to fatigue x5 EC to fatigue x5 EC to fatigue x5 EC to fatigue x5 EC to fatigue (CGA) x5 EC to fatigue (CGA)   Tandem x3 ea EO (CGA)    10"x5 ea b/l x5 ea to fatigue x5 ea to fatigue x5 ea to fatigue x5 ea to fatigue x5 ea to fatigue x5 ea to fatigue x5 ea to fatigue (CGA) x5 ea to fatigue (CGA)   SLS 5x to fatigue no assist    NV NV 3xto fatigue finger touch A NV 3x to fatigue finger touchA 3x to fatigue finger touch A 5x to fatigue finger touch A 5x to fatigue finger touch A 3x to fatigue no assist   Weight shifts     HEP education HEP HEP HEP HEP Hep   HEP HEP HEP                                                   Ther Ex                Rec bike EUCEDA 5 min 5 min 5 min 5 min 5 min 5 min 5 min 5 min 5 min 5 min 5 min 5 min 5 min   Strap gastroc stretch HEP HEP HEP HEP HEP HEP HEP 30"x4 HEP hep HEP HEP    Ankle DF/PF AROM HEP 5"x30 ea blue 5"x30 ea blue 5"x30 ea blue 5"x30 ea black HEP HEP HEP HEP hep HEP HEP    Ankle INV/EV AROM HEP 5"x30 ea blue 5" x30 ea blue 5"x30 ea blue 5"x30 ea black HEP HEP HEP HEP hep HEP HEP    Seated heel slides  10"x10 10"x10 10"x10 np           Seated heel raises 30x stand 20 stand 20x stand 20x stand 20x stand 20x stand 20x stand 20x stand 25x stand 25x stand 30x stand 30x stand 30x stand   Standing toe raises 30x 20 20x 20x 20x 20x 20x 20x 25x 25x 30x 30x 30x   BAPS board  3"x30 DF/PF L3 5# 3"x30 DF/PF L3 5# 3"x30 DF/PF 5# 3"x10 DF/PF 10# 3"x20 DF/PF 10# 3"x20 DF/PF 10# L3 3"x20 DF/PF 10# L3 3"x30 DF/PF 10# L3 3"x30 DF/PF 10# L3 np np    Towel intrinsic scoops  5"x30 5"x30 5"x30 HEP HEP HEP HEP HEP hep HEP HEP    Mini squats x30 5 5 5 20 20 25 25 30 30x 30x 30x 30x   Standing heel-toe rock                Wall gastroc stretch  30"x4 30"x4 30"x4 HEP HEP HEP HEP HEP hep HEP HEP    Step ups x20 R, x10 L L2        15 no riser, R HR x15 no riser x12 L1 x20 L2 x20 L2   Leg press x20 DL 70#          2x10 DL 50# x20 DL 60# x20 DL 60#, SL NV   HEP education and instruction                Ther Activity                                                Gait Training                Step up and down (step to)                                Modalities

## 2022-01-17 ENCOUNTER — OFFICE VISIT (OUTPATIENT)
Dept: PHYSICAL THERAPY | Facility: MEDICAL CENTER | Age: 57
End: 2022-01-17
Payer: OTHER MISCELLANEOUS

## 2022-01-17 DIAGNOSIS — S92.354K CLOSED NONDISPLACED FRACTURE OF FIFTH METATARSAL BONE OF RIGHT FOOT WITH NONUNION: ICD-10-CM

## 2022-01-17 DIAGNOSIS — Z98.890 S/P ORIF (OPEN REDUCTION INTERNAL FIXATION) FRACTURE: Primary | ICD-10-CM

## 2022-01-17 DIAGNOSIS — Z87.81 S/P ORIF (OPEN REDUCTION INTERNAL FIXATION) FRACTURE: Primary | ICD-10-CM

## 2022-01-17 PROCEDURE — 97140 MANUAL THERAPY 1/> REGIONS: CPT | Performed by: PHYSICAL THERAPIST

## 2022-01-17 PROCEDURE — 97112 NEUROMUSCULAR REEDUCATION: CPT | Performed by: PHYSICAL THERAPIST

## 2022-01-17 PROCEDURE — 97110 THERAPEUTIC EXERCISES: CPT | Performed by: PHYSICAL THERAPIST

## 2022-01-17 NOTE — PROGRESS NOTES
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  S92 750K                   Subjective: Patient reports that her foot is feeling okay, but she is still having difficulty going up and down the stairs  She is no longer bumping on her buttock on the stairs, but she is facing backwards when descending  Objective: See treatment diary below      Assessment: Patient continues to demonstrate good progress with improving R ankle AROM and PROM in all planes  Patient was challenged with addition of airex pad during balance interventions today  CGA provided during Romberg and tandem stance to maintain safety  Patient was educated to perform SLS during HEP near her countertop to further improve balance and improve quality of gait mechanics  Performed step ups bilaterally to practice utilizing B LE in preparation for reciprocal stair negotiation  Able to progress reps for leg press, which demonstrates good progress toward goals  Patient would benefit from continued PT to improve ankle stability to return to full participation in job duties and stair climbing  Plan: Continue per plan of care        Precautions: s/p R 5th metatarsal ORIF (DOS: 21)- WBAT (NO CAM BOOT), hx of seizures, hx of DVT (x2), hx of lumbar fracture (L1-L4), hx of L shoulder arthroscopy      Manuals 1/13 1/17 11/22 11/26 11/29 12/2 12/6 12/13 12/16 12/20 12/27 12/30 1/3 1/10   R ankle PROM KP KP KP [place] KP KP KP KP KP KP RK KP KP KP   R 1st MTP EXT PROM KP KP KP [place] KP KP KP KP KP KP RK KP KP KP                                     Neuro Re-Ed                 Romberg x5 EC to fatigue (CGA) x5 EC to fatigue airex (CGA)     x5 EC to fatigue x5 EC to fatigue x5 EC to fatigue x5 EC to fatigue x5 EC to fatigue x5 EC to fatigue x5 EC to fatigue (CGA) x5 EC to fatigue (CGA)   Tandem x3 ea EO (CGA) x3 ea EO airex (CGA)    10"x5 ea b/l x5 ea to fatigue x5 ea to fatigue x5 ea to fatigue x5 ea to fatigue x5 ea to fatigue x5 ea to fatigue x5 ea to fatigue (CGA) x5 ea to fatigue (CGA)   SLS 5x to fatigue no assist 5x to fatigue no assist    NV NV 3xto fatigue finger touch A NV 3x to fatigue finger touchA 3x to fatigue finger touch A 5x to fatigue finger touch A 5x to fatigue finger touch A 3x to fatigue no assist   Weight shifts      HEP education HEP HEP HEP HEP Hep   HEP HEP HEP                                                      Ther Ex                 Rec bike EUCEDA 5 min 5 min 5 min 5 min 5 min 5 min 5 min 5 min 5 min 5 min 5 min 5 min 5 min 5 min   Strap gastroc stretch HEP HEP HEP HEP HEP HEP HEP HEP 30"x4 HEP hep HEP HEP    Ankle DF/PF AROM HEP HEP 5"x30 ea blue 5"x30 ea blue 5"x30 ea blue 5"x30 ea black HEP HEP HEP HEP hep HEP HEP    Ankle INV/EV AROM HEP HEP 5"x30 ea blue 5" x30 ea blue 5"x30 ea blue 5"x30 ea black HEP HEP HEP HEP hep HEP HEP    Seated heel slides   10"x10 10"x10 10"x10 np           Seated heel raises 30x stand 30x stand 20 stand 20x stand 20x stand 20x stand 20x stand 20x stand 20x stand 25x stand 25x stand 30x stand 30x stand 30x stand   Standing toe raises 30x 30x 20 20x 20x 20x 20x 20x 20x 25x 25x 30x 30x 30x   BAPS board   3"x30 DF/PF L3 5# 3"x30 DF/PF L3 5# 3"x30 DF/PF 5# 3"x10 DF/PF 10# 3"x20 DF/PF 10# 3"x20 DF/PF 10# L3 3"x20 DF/PF 10# L3 3"x30 DF/PF 10# L3 3"x30 DF/PF 10# L3 np np    Towel intrinsic scoops   5"x30 5"x30 5"x30 HEP HEP HEP HEP HEP hep HEP HEP    Mini squats x30 X30, wall ball NV 5 5 5 20 20 25 25 30 30x 30x 30x 30x   Standing heel-toe rock                 Wall gastroc stretch   30"x4 30"x4 30"x4 HEP HEP HEP HEP HEP hep HEP HEP    Step ups x20 R, x10 L L2 x30 R, x15 L L2        15 no riser, R HR x15 no riser x12 L1 x20 L2 x20 L2 Step downs  NV               Leg press x20 DL 70# x25 DL 70#, SL NV          2x10 DL 50# x20 DL 60# x20 DL 60#, SL NV   HEP education and instruction                 Ther Activity                                                   Gait Training                 Step up and down (step to)                                  Modalities

## 2022-01-20 ENCOUNTER — OFFICE VISIT (OUTPATIENT)
Dept: PHYSICAL THERAPY | Facility: MEDICAL CENTER | Age: 57
End: 2022-01-20
Payer: OTHER MISCELLANEOUS

## 2022-01-20 DIAGNOSIS — Z87.81 S/P ORIF (OPEN REDUCTION INTERNAL FIXATION) FRACTURE: Primary | ICD-10-CM

## 2022-01-20 DIAGNOSIS — S92.354K CLOSED NONDISPLACED FRACTURE OF FIFTH METATARSAL BONE OF RIGHT FOOT WITH NONUNION: ICD-10-CM

## 2022-01-20 DIAGNOSIS — Z98.890 S/P ORIF (OPEN REDUCTION INTERNAL FIXATION) FRACTURE: Primary | ICD-10-CM

## 2022-01-20 PROCEDURE — 97110 THERAPEUTIC EXERCISES: CPT | Performed by: PHYSICAL THERAPIST

## 2022-01-20 PROCEDURE — 97112 NEUROMUSCULAR REEDUCATION: CPT | Performed by: PHYSICAL THERAPIST

## 2022-01-20 NOTE — PROGRESS NOTES
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  S92 354K                   Subjective: Patient reports that her foot is feeling okay  She reports that she is still unable to go up the stairs reciprocally, and she is facing backwards when going down the stairs  Objective: See treatment diary below      Assessment: Held manual PROM due to patient demonstrating R ankle AROM and PROM WFL and due to patient being independent with HEP for continued ankle mobility  Focused session on improving R ankle strength and balance to improve quality of gait mechanics and ability to negotiate stairs  CGA provided during balance interventions to ensure safety  Patient demonstrated improved control during SLS today compared to last session  Progressed mini squats to be performed at the wall without UE assist and progressed leg press to be performed SL, which demonstrates good progress toward goals  Cueing provided to modify depth and avoid R knee pain during step downs  Patient demonstrated fatigue at end of session  Patient would benefit from continued PT to improve CKC ankle strength to facilitate a full return to job duties  Plan: Continue per plan of care        Precautions: s/p R 5th metatarsal ORIF (DOS: 21)- WBAT (NO CAM BOOT), hx of seizures, hx of DVT (x2), hx of lumbar fracture (L1-L4), hx of L shoulder arthroscopy      Manuals    R ankle PROM KP KP np   R 1st MTP EXT PROM KP KP np               Neuro Re-Ed      Romberg x5 EC to fatigue (CGA) x5 EC to fatigue airex (CGA) x5 EC to fatigue airex (CGA)   Tandem x3 ea EO (CGA) x3 ea EO airex (CGA) x5 ea EO airex (CGA)   SLS 5x to fatigue no UE assist (CGA) 5x to fatigue no UE assist (CGA) 5x to fatigue no UE assist (CGA)                     Ther Ex      Rec bike EUCEDA 5 min 5 min 5 min   Strap gastroc stretch HEP HEP HEP   Ankle DF/PF AROM HEP HEP HEP   Ankle INV/EV AROM HEP HEP HEP   Seated heel raises 30x stand 30x stand 30x stand   Standing toe raises 30x 30x 30x   Mini squats x30 X30, wall ball NV x10 wall ball   Step ups x20 R, x10 L L2 x30 R, x15 L L2 x30 R L2   Step downs  NV x10 no riser   Leg press x20 DL 70# x25 DL 70#, SL NV x30 DL 70#, x10 SL 30#   HEP education and instruction      Ther Activity                  Gait Training            Modalities

## 2022-01-24 ENCOUNTER — OFFICE VISIT (OUTPATIENT)
Dept: PHYSICAL THERAPY | Facility: MEDICAL CENTER | Age: 57
End: 2022-01-24
Payer: OTHER MISCELLANEOUS

## 2022-01-24 DIAGNOSIS — S92.354K CLOSED NONDISPLACED FRACTURE OF FIFTH METATARSAL BONE OF RIGHT FOOT WITH NONUNION: ICD-10-CM

## 2022-01-24 DIAGNOSIS — Z87.81 S/P ORIF (OPEN REDUCTION INTERNAL FIXATION) FRACTURE: Primary | ICD-10-CM

## 2022-01-24 DIAGNOSIS — Z98.890 S/P ORIF (OPEN REDUCTION INTERNAL FIXATION) FRACTURE: Primary | ICD-10-CM

## 2022-01-24 PROCEDURE — 97110 THERAPEUTIC EXERCISES: CPT | Performed by: PHYSICAL THERAPIST

## 2022-01-24 PROCEDURE — 97112 NEUROMUSCULAR REEDUCATION: CPT | Performed by: PHYSICAL THERAPIST

## 2022-01-24 NOTE — PROGRESS NOTES
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  S92 518K                   Subjective: Patient reports that she is feeling good with no major changes since last session  Objective: See treatment diary below      Assessment: Continued to hold on manual interventions due to patient demonstrating good progress with improving R ankle AROM and PROM and to focus sessions on CKC strengthening/balance  Patient continues to demonstrate improved control during Romberg/tandem/SLS each session but is challenged when UE assist is removed  Patient demonstrated 1 mild LOB when performing tandem stance but was able to self-correct  Cueing provided to prevent lateral pelvic drop during step downs  Able to progress resistance for DL and SL leg press and progress reps for wall ball squats, which further demonstrates good progress toward goals  Patient would benefit from continued PT to facilitate a full return to job duties and stair negotiation  Plan: Continue per plan of care        Precautions: s/p R 5th metatarsal ORIF (DOS: 21)- WBAT (NO CAM BOOT), hx of seizures, hx of DVT (x2), hx of lumbar fracture (L1-L4), hx of L shoulder arthroscopy      Manuals    R ankle PROM KP KP np np   R 1st MTP EXT PROM KP KP np np                 Neuro Re-Ed       Romberg x5 EC to fatigue (CGA) x5 EC to fatigue airex (CGA) x5 EC to fatigue airex (CGA) x5 EC to fatigue airex (CGA)   Tandem x3 ea EO (CGA) x3 ea EO airex (CGA) x5 ea EO airex (CGA) x5 ea EO airex (CGA)   SLS 5x to fatigue no UE assist (CGA) 5x to fatigue no UE assist (CGA) 5x to fatigue no UE assist (CGA) 5x to fatigue no UE assist (CGA) Ther Ex       Rec bike EUCEDA 5 min 5 min 5 min 5 min   Strap gastroc stretch HEP HEP HEP    Ankle DF/PF AROM HEP HEP HEP    Ankle INV/EV AROM HEP HEP HEP    Seated heel raises 30x stand 30x stand 30x stand 30x stand   Standing toe raises 30x 30x 30x 30x   Mini squats x30 X30, wall ball NV x10 wall ball x20 wall ball   Step ups x20 R, x10 L L2 x30 R, x15 L L2 x30 R L2 x30 R L2, x20 L L2   Step downs  NV x10 no riser x10 no riser   Leg press x20 DL 70# x25 DL 70#, SL NV x30 DL 70#, x10 SL 30# x30 DL 80#, x20 SL 40#   HEP education and instruction       Ther Activity                     Gait Training              Modalities

## 2022-01-27 ENCOUNTER — OFFICE VISIT (OUTPATIENT)
Dept: PHYSICAL THERAPY | Facility: MEDICAL CENTER | Age: 57
End: 2022-01-27
Payer: OTHER MISCELLANEOUS

## 2022-01-27 DIAGNOSIS — Z98.890 S/P ORIF (OPEN REDUCTION INTERNAL FIXATION) FRACTURE: Primary | ICD-10-CM

## 2022-01-27 DIAGNOSIS — S92.354K CLOSED NONDISPLACED FRACTURE OF FIFTH METATARSAL BONE OF RIGHT FOOT WITH NONUNION: ICD-10-CM

## 2022-01-27 DIAGNOSIS — Z87.81 S/P ORIF (OPEN REDUCTION INTERNAL FIXATION) FRACTURE: Primary | ICD-10-CM

## 2022-01-27 PROCEDURE — 97110 THERAPEUTIC EXERCISES: CPT | Performed by: PHYSICAL THERAPIST

## 2022-01-27 PROCEDURE — 97112 NEUROMUSCULAR REEDUCATION: CPT | Performed by: PHYSICAL THERAPIST

## 2022-01-27 NOTE — PROGRESS NOTES
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  S92 052K                   Subjective: Patient reports that she is still having difficulty going down the stairs, but her foot feels okay overall  Objective: See treatment diary below      Assessment: Patient continues to demonstrate steady progress with increasing her duration of Romberg, tandem stance, and SLS  However, she is challenged when removing UE assist  Patient reported 1 episode of R knee pain during step downs that was resolved after changing foot position  Decreased reps of wall ball squats due to R knee discomfort  Patient demonstrated fatigue at end of session  Patient is demonstrating steady progress toward her goals and would benefit from continued PT to further progress strengthening to restore ability to negotiate stairs  Plan: Continue per plan of care        Precautions: s/p R 5th metatarsal ORIF (DOS: 21)- WBAT (NO CAM BOOT), hx of seizures, hx of DVT (x2), hx of lumbar fracture (L1-L4), hx of L shoulder arthroscopy      Manuals    R ankle PROM KP KP np np np   R 1st MTP EXT PROM KP KP np np np                   Neuro Re-Ed        Romberg x5 EC to fatigue (CGA) x5 EC to fatigue airex (CGA) x5 EC to fatigue airex (CGA) x5 EC to fatigue airex (CGA) x5 EC to fatigue airex (CGA)   Tandem x3 ea EO (CGA) x3 ea EO airex (CGA) x5 ea EO airex (CGA) x5 ea EO airex (CGA) x5 ea EO airex (CGA)   SLS 5x to fatigue no UE assist (CGA) 5x to fatigue no UE assist (CGA) 5x to fatigue no UE assist (CGA) 5x to fatigue no UE assist (CGA) 5x to fatigue no UE assist (CGA)                           Ther Ex        Rec bike EUCEDA 5 min 5 min 5 min 5 min 5 min   Strap gastroc stretch HEP HEP HEP     Ankle DF/PF AROM HEP HEP HEP     Ankle INV/EV AROM HEP HEP HEP     Seated heel raises 30x stand 30x stand 30x stand 30x stand 30x stand   Standing toe raises 30x 30x 30x 30x 30x    Mini squats x30 X30, wall ball NV x10 wall ball x20 wall ball x16 wall ball   Step ups x20 R, x10 L L2 x30 R, x15 L L2 x30 R L2 x30 R L2, x20 L L2 x30 R L2, x20 L L2   Step downs  NV x10 no riser x10 no riser x10 no riser   Leg press x20 DL 70# x25 DL 70#, SL NV x30 DL 70#, x10 SL 30# x30 DL 80#, x20 SL 40# x30 DL 80#, x20 SL 40#   HEP education and instruction        Ther Activity                        Gait Training                Modalities

## 2022-01-31 ENCOUNTER — OFFICE VISIT (OUTPATIENT)
Dept: PHYSICAL THERAPY | Facility: MEDICAL CENTER | Age: 57
End: 2022-01-31
Payer: OTHER MISCELLANEOUS

## 2022-01-31 DIAGNOSIS — Z87.81 S/P ORIF (OPEN REDUCTION INTERNAL FIXATION) FRACTURE: Primary | ICD-10-CM

## 2022-01-31 DIAGNOSIS — S92.354K CLOSED NONDISPLACED FRACTURE OF FIFTH METATARSAL BONE OF RIGHT FOOT WITH NONUNION: ICD-10-CM

## 2022-01-31 DIAGNOSIS — Z98.890 S/P ORIF (OPEN REDUCTION INTERNAL FIXATION) FRACTURE: Primary | ICD-10-CM

## 2022-01-31 PROCEDURE — 97110 THERAPEUTIC EXERCISES: CPT | Performed by: PHYSICAL THERAPIST

## 2022-01-31 PROCEDURE — 97112 NEUROMUSCULAR REEDUCATION: CPT | Performed by: PHYSICAL THERAPIST

## 2022-01-31 NOTE — PROGRESS NOTES
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  S92 724K                   Subjective: Patient reports that her ankle is feeling pretty good  She is able to go up 3 stairs reciprocally  In addition, she is now facing forward when going down the stairs  However, she turns her foot sideways when going down the stairs  Objective: See treatment diary below      Assessment: Patient is also demonstrating improved control during all balance interventions but is challenged when leading with L LE during tandem stance  She also demonstrates difficulty with SLS when removing UE assist  Patient is improving in LE endurance as evidenced by increasing reps for wall ball squats and leg press  Cueing provided to prevent lateral pelvic drop during step downs  Patient was educated to continue to practice reciprocal stair negotiation at home  Patient would benefit from continued PT to further progress strengthening to be able to ascend/descend stairs and return to full participation in job duties  Plan: Continue per plan of care        Precautions: s/p R 5th metatarsal ORIF (DOS: 21)- WBAT (NO CAM BOOT), hx of seizures, hx of DVT (x2), hx of lumbar fracture (L1-L4), hx of L shoulder arthroscopy      Manuals    R ankle PROM KP KP np np np np   R 1st MTP EXT PROM KP KP np np np np                     Neuro Re-Ed         Romberg x5 EC to fatigue (CGA) x5 EC to fatigue airex (CGA) x5 EC to fatigue airex (CGA) x5 EC to fatigue airex (CGA) x5 EC to fatigue airex (CGA) x5 EC to fatigue airex (CGA)   Tandem x3 ea EO (CGA) x3 ea EO airex (CGA) x5 ea EO airex (CGA) x5 ea EO airex (CGA) x5 ea EO airex (CGA) x5 ea EO airex (CGA)   SLS 5x to fatigue no UE assist (CGA) 5x to fatigue no UE assist (CGA) 5x to fatigue no UE assist (CGA) 5x to fatigue no UE assist (CGA) 5x to fatigue no UE assist (CGA) 5x to fatigue no UE assist (CGA)                              Ther Ex         Rec bike EUCEDA 5 min 5 min 5 min 5 min 5 min 5 min   Strap gastroc stretch HEP HEP HEP      Ankle DF/PF AROM HEP HEP HEP      Ankle INV/EV AROM HEP HEP HEP      Seated heel raises 30x stand 30x stand 30x stand 30x stand 30x stand 30x stand   Standing toe raises 30x 30x 30x 30x 30x  30x   Mini squats x30 X30, wall ball NV x10 wall ball x20 wall ball x16 wall ball x20 wall ball   Step ups x20 R, x10 L L2 x30 R, x15 L L2 x30 R L2 x30 R L2, x20 L L2 x30 R L2, x20 L L2 x30 R L2, x30 L2   Step downs  NV x10 no riser x10 no riser x10 no riser x15 no riser   Leg press x20 DL 70# x25 DL 70#, SL NV x30 DL 70#, x10 SL 30# x30 DL 80#, x20 SL 40# x30 DL 80#, x20 SL 40# x30 DL 80#, x30 SL 40#   HEP education and instruction         Ther Activity                           Gait Training                  Modalities

## 2022-02-03 ENCOUNTER — OFFICE VISIT (OUTPATIENT)
Dept: PHYSICAL THERAPY | Facility: MEDICAL CENTER | Age: 57
End: 2022-02-03
Payer: OTHER MISCELLANEOUS

## 2022-02-03 DIAGNOSIS — Z87.81 S/P ORIF (OPEN REDUCTION INTERNAL FIXATION) FRACTURE: Primary | ICD-10-CM

## 2022-02-03 DIAGNOSIS — Z98.890 S/P ORIF (OPEN REDUCTION INTERNAL FIXATION) FRACTURE: Primary | ICD-10-CM

## 2022-02-03 DIAGNOSIS — S92.354K CLOSED NONDISPLACED FRACTURE OF FIFTH METATARSAL BONE OF RIGHT FOOT WITH NONUNION: ICD-10-CM

## 2022-02-03 PROCEDURE — 97110 THERAPEUTIC EXERCISES: CPT | Performed by: PHYSICAL THERAPIST

## 2022-02-03 PROCEDURE — 97112 NEUROMUSCULAR REEDUCATION: CPT | Performed by: PHYSICAL THERAPIST

## 2022-02-03 NOTE — PROGRESS NOTES
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  S92 097K                   Subjective: Patient reports that she is continuing to feel better  She is able to go up 3 stairs reciprocally, and she is now able to go down 1 step leading with the R leg while facing forward  She continues to go down the rest of her flight with her R foot sideways  She has a follow-up with her physician this upcoming Monday ()  Objective: See treatment diary below      Assessment: Continued to hold on manual interventions due to patient demonstrating improved R ankle DF ROM  Focused session on CKC strengthening and balance progressions to restore ability to descend stairs  Patient is consistently demonstrating good progress with increasing stability and control during balance interventions and progressing foot/ankle strengthening interventions  This has improved her ability to ambulate and ascend stairs at home  Patient is demonstrating steady progress toward her goals and has been compliant with PT session attendance and HEP performance  Patient demonstrated fatigue at end of session  Patient would benefit from continued PT to further progress CKC LE strengthening to restore a return to full participation in job duties and to be able to descend stairs  Goals  Patient will be independent with home exercise program - met   Patient will increase R ankle DF AROM to at least 5 degrees to improve quality of gait mechanics  - in progress (improved)  Patient will increase R ankle strength to at least 4+/5 to be able to ambulate longer distances  - in progress (improved)  Patient will exceed MCID on FOTO to demonstrate improved function  - in progress (improved)  Patient will be able to ascend/descend stairs  - in progress (improved)  Patient will return to job duties with 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, 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)  Patient will be able to manage symptoms independently  - in progress       Plan: Continue per POC  Decrease frequency to 1x/week due to good progress toward goals and independence with HEP  Follow-up in 1 week for re-assessment and potential d/c to HEP       Precautions: s/p R 5th metatarsal ORIF (DOS: 6/11/21)- WBAT (NO CAM BOOT), hx of seizures, hx of DVT (x2), hx of lumbar fracture (L1-L4), hx of L shoulder arthroscopy      Manuals 1/13 1/17 1/20 1/24 1/27 1/31 2/3   R ankle PROM KP KP np np np np np   R 1st MTP EXT PROM KP KP np np np np np                       Neuro Re-Ed          Romberg x5 EC to fatigue (CGA) x5 EC to fatigue airex (CGA) x5 EC to fatigue airex (CGA) x5 EC to fatigue airex (CGA) x5 EC to fatigue airex (CGA) x5 EC to fatigue airex (CGA) x5 EC to fatigue airex (CGA)   Tandem x3 ea EO (CGA) x3 ea EO airex (CGA) x5 ea EO airex (CGA) x5 ea EO airex (CGA) x5 ea EO airex (CGA) x5 ea EO airex (CGA) x5 ea EO airex (CGA)   SLS 5x to fatigue no UE assist (CGA) 5x to fatigue no UE assist (CGA) 5x to fatigue no UE assist (CGA) 5x to fatigue no UE assist (CGA) 5x to fatigue no UE assist (CGA) 5x to fatigue no UE assist (CGA) 5x to fatigue no UE assist (CGA)                                 Ther Ex          Rec bike EUCEDA 5 min 5 min 5 min 5 min 5 min 5 min 5 min   Strap gastroc stretch HEP HEP HEP       Ankle DF/PF AROM HEP HEP HEP       Ankle INV/EV AROM HEP HEP HEP       Seated heel raises 30x stand 30x stand 30x stand 30x stand 30x stand 30x stand 30x stand   Standing toe raises 30x 30x 30x 30x 30x  30x 30x   Mini squats x30 X30, wall ball NV x10 wall ball x20 wall ball x16 wall ball x20 wall ball x35 wall ball   Step ups x20 R, x10 L L2 x30 R, x15 L L2 x30 R L2 x30 R L2, x20 L L2 x30 R L2, x20 L L2 x30 R L2, x30 L2 x30 R L2, x20 L L2   Step downs  NV x10 no riser x10 no riser x10 no riser x15 no riser x15 no riser   Leg press x20 DL 70# x25 DL 70#, SL NV x30 DL 70#, x10 SL 30# x30 DL 80#, x20 SL 40# x30 DL 80#, x20 SL 40# x30 DL 80#, x30 SL 40# x30 DL 90#, x30 SL 45#   HEP education and instruction          Ther Activity                              Gait Training                    Modalities

## 2022-02-07 ENCOUNTER — OFFICE VISIT (OUTPATIENT)
Dept: PODIATRY | Facility: CLINIC | Age: 57
End: 2022-02-07
Payer: OTHER MISCELLANEOUS

## 2022-02-07 VITALS
WEIGHT: 203 LBS | SYSTOLIC BLOOD PRESSURE: 116 MMHG | BODY MASS INDEX: 41 KG/M2 | DIASTOLIC BLOOD PRESSURE: 80 MMHG | HEART RATE: 79 BPM

## 2022-02-07 DIAGNOSIS — S92.901K NONUNION OF FRACTURE OF FOOT, RIGHT: Primary | ICD-10-CM

## 2022-02-07 PROCEDURE — 99213 OFFICE O/P EST LOW 20 MIN: CPT | Performed by: PODIATRIST

## 2022-02-07 NOTE — PROGRESS NOTES
This patient was seen on 2/7/22  My role is Foot , Ankle, and Wound Specialist    SUBJECTIVE    Chief Complaint:  S/P George fracture repair     Patient ID: Kaushal Neal is a 60 y o  female  Colton Larose is here with her  for her foot  She notes significant improvement since last visit  She continues to go to PT  She can basically walk without pain  She's wearing normal sneakers  Her main issue is still some problems with stairs (up and down) and now she can do two steps at a time without pain but develops pain beyond that  The following portions of the patient's history were reviewed and updated as appropriate: allergies, current medications, past family history, past medical history, past social history, past surgical history and problem list     Review of Systems   Constitutional: Negative  Negative for appetite change, chills, diaphoresis, fatigue, fever and unexpected weight change  Respiratory: Negative  Musculoskeletal: Positive for gait problem  Negative for arthralgias and myalgias  OBJECTIVE      /80   Pulse 79   Wt 92 1 kg (203 lb)   LMP 08/01/2000 (Approximate)   BMI 41 00 kg/m²     Foot/Ankle Musculoskeletal Exam    General      Neurological: alert      General additional comments: I note minimal pain on palpation of the ORIF site  Minimal edema noted  I note muscle function of the anterior, posterior, evertor and invertor muscle groups of the lower leg are intact and normal  I note ROM of the ankle joint, subtalar joint, Choparts and Lisfranc's joint complexes and metatarsophalangeal joints are WNL without crepitus nor restrictions bilaterally  Physical Exam  Vitals and nursing note reviewed  Constitutional:       General: She is not in acute distress  Appearance: Normal appearance  She is not ill-appearing, toxic-appearing or diaphoretic  Musculoskeletal:      Comments: I note minimal pain on palpation of the ORIF site  Minimal edema noted    I note muscle function of the anterior, posterior, evertor and invertor muscle groups of the lower leg are intact and normal  I note ROM of the ankle joint, subtalar joint, Choparts and Lisfranc's joint complexes and metatarsophalangeal joints are WNL without crepitus nor restrictions bilaterally  Neurological:      Mental Status: She is alert  ASSESSMENT     Diagnoses and all orders for this visit:    Nonunion of fracture of foot, right         Problem List Items Addressed This Visit        Musculoskeletal and Integument    Nonunion of fracture of foot, right - Primary              PLAN  At this point, she can return to work full time  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

## 2022-02-08 ENCOUNTER — TELEPHONE (OUTPATIENT)
Dept: PODIATRY | Facility: CLINIC | Age: 57
End: 2022-02-08

## 2022-02-08 NOTE — TELEPHONE ENCOUNTER
Kenzie Treadwell from [employer] called  She is looking for the office note from the patient's  2/7/22 office visit  and a separate work status for her as well         Please fax to:      [phone]    Thank you!!!

## 2022-02-10 ENCOUNTER — OFFICE VISIT (OUTPATIENT)
Dept: PHYSICAL THERAPY | Facility: MEDICAL CENTER | Age: 57
End: 2022-02-10
Payer: OTHER MISCELLANEOUS

## 2022-02-10 DIAGNOSIS — S92.354K CLOSED NONDISPLACED FRACTURE OF FIFTH METATARSAL BONE OF RIGHT FOOT WITH NONUNION: ICD-10-CM

## 2022-02-10 DIAGNOSIS — Z87.81 S/P ORIF (OPEN REDUCTION INTERNAL FIXATION) FRACTURE: Primary | ICD-10-CM

## 2022-02-10 DIAGNOSIS — Z98.890 S/P ORIF (OPEN REDUCTION INTERNAL FIXATION) FRACTURE: Primary | ICD-10-CM

## 2022-02-10 PROCEDURE — 97110 THERAPEUTIC EXERCISES: CPT | Performed by: PHYSICAL THERAPIST

## 2022-02-10 PROCEDURE — 97112 NEUROMUSCULAR REEDUCATION: CPT | Performed by: PHYSICAL THERAPIST

## 2022-02-10 NOTE — PROGRESS NOTES
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  S92 748K                   Subjective: Patient reports that her ankle is continuing to feel better, and she is progressing overall  She notes that she is still having difficulty going up and down the stairs  She is able to go up approximately 3 stairs and then uses her upper body to pull herself up  She is now able to go down 2-3 stairs facing forward  She saw her physician earlier this week, and 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  However, her work hours have been extended in duration  Objective: See treatment diary below      Assessment: Patient is continuing to demonstrate excellent progress with improving R ankle AROM in all planes  Therefore, we have been focusing recent sessions on improving balance and CKC strength to improve ability to negotiate stairs  Her balance is continually improving each session as evidenced by increasing duration of both Romberg and tandem stance  Patient is also consistently demonstrating less fatigue throughout sessions and has increased standing tolerance  Although improved, patient continues to be challenged with SL stability interventions, which contributes to difficulty ascending and descending stairs at home  Patient is demonstrating slow but steady progress toward her goals and is gaining independence with her HEP  Due to good progress, plan to follow-up in 2 weeks for potential d/c  Patient would benefit from continued PT to further improve R LE strength and motor control to restore ability to negotiate stairs and participate in full job duties       Goals  Patient will be independent with home exercise program - met   Patient will increase R ankle DF AROM to at least 5 degrees to improve quality of gait mechanics  - in progress (improved)  Patient will increase R ankle strength to at least 4+/5 to be able to ambulate longer distances  - in progress (improved)  Patient will exceed MCID on FOTO to demonstrate improved function  - in progress (improved)  Patient will be able to ascend/descend stairs  - in progress (improved)  Patient will return to job duties with 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, 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)  Patient will be able to manage symptoms independently  - in progress         Plan: Follow-up in 2 weeks for potential discharge to home exercise program      Precautions: s/p R 5th metatarsal ORIF (DOS: 6/11/21)- WBAT (NO CAM BOOT), hx of seizures, hx of DVT (x2), hx of lumbar fracture (L1-L4), hx of L shoulder arthroscopy      Manuals 1/13 1/17 1/20 1/24 1/27 1/31 2/3 2/10   R ankle PROM KP KP np np np np np    R 1st MTP EXT PROM KP KP np np np np np                          Neuro Re-Ed           Romberg x5 EC to fatigue (CGA) x5 EC to fatigue airex (CGA) x5 EC to fatigue airex (CGA) x5 EC to fatigue airex (CGA) x5 EC to fatigue airex (CGA) x5 EC to fatigue airex (CGA) x5 EC to fatigue airex (CGA) x5 EC to fatigue airex (CGA)   Tandem x3 ea EO (CGA) x3 ea EO airex (CGA) x5 ea EO airex (CGA) x5 ea EO airex (CGA) x5 ea EO airex (CGA) x5 ea EO airex (CGA) x5 ea EO airex (CGA) x5 ea EO airex (CGA)   SLS 5x to fatigue no UE assist (CGA) 5x to fatigue no UE assist (CGA) 5x to fatigue no UE assist (CGA) 5x to fatigue no UE assist (CGA) 5x to fatigue no UE assist (CGA) 5x to fatigue no UE assist (CGA) 5x to fatigue no UE assist (CGA) 5x to fatigue no UE assist (CGA)                                    Ther Ex           Rec bike EUCEDA 5 min 5 min 5 min 5 min 5 min 5 min 5 min 5 min   Strap gastroc stretch HEP HEP HEP        Ankle DF/PF AROM HEP HEP HEP Ankle INV/EV AROM HEP HEP HEP        Seated heel raises 30x stand 30x stand 30x stand 30x stand 30x stand 30x stand 30x stand 30x stand   Standing toe raises 30x 30x 30x 30x 30x  30x 30x 30x   Mini squats x30 X30, wall ball NV x10 wall ball x20 wall ball x16 wall ball x20 wall ball x35 wall ball x35 wall ball   Step ups x20 R, x10 L L2 x30 R, x15 L L2 x30 R L2 x30 R L2, x20 L L2 x30 R L2, x20 L L2 x30 R L2, x30 L2 x30 R L2, x20 L L2 x30 R L2, x22 L2   Step downs  NV x10 no riser x10 no riser x10 no riser x15 no riser x15 no riser x30 no riser   Leg press x20 DL 70# x25 DL 70#, SL NV x30 DL 70#, x10 SL 30# x30 DL 80#, x20 SL 40# x30 DL 80#, x20 SL 40# x30 DL 80#, x30 SL 40# x30 DL 90#, x30 SL 45# x30 DL 90#, x30 SL 45#   HEP education and instruction           Ther Activity                                 Gait Training                      Modalities

## 2022-02-16 ENCOUNTER — OFFICE VISIT (OUTPATIENT)
Dept: FAMILY MEDICINE CLINIC | Facility: CLINIC | Age: 57
End: 2022-02-16
Payer: COMMERCIAL

## 2022-02-16 DIAGNOSIS — R19.7 DIARRHEA, UNSPECIFIED TYPE: Primary | ICD-10-CM

## 2022-02-16 DIAGNOSIS — R53.83 OTHER FATIGUE: ICD-10-CM

## 2022-02-16 DIAGNOSIS — B34.9 VIRAL SYNDROME: ICD-10-CM

## 2022-02-16 PROCEDURE — U0003 INFECTIOUS AGENT DETECTION BY NUCLEIC ACID (DNA OR RNA); SEVERE ACUTE RESPIRATORY SYNDROME CORONAVIRUS 2 (SARS-COV-2) (CORONAVIRUS DISEASE [COVID-19]), AMPLIFIED PROBE TECHNIQUE, MAKING USE OF HIGH THROUGHPUT TECHNOLOGIES AS DESCRIBED BY CMS-2020-01-R: HCPCS | Performed by: FAMILY MEDICINE

## 2022-02-16 PROCEDURE — 99213 OFFICE O/P EST LOW 20 MIN: CPT | Performed by: FAMILY MEDICINE

## 2022-02-16 PROCEDURE — U0005 INFEC AGEN DETEC AMPLI PROBE: HCPCS | Performed by: FAMILY MEDICINE

## 2022-02-16 NOTE — PROGRESS NOTES
Assessment/Plan:  Supportive care recommended  COVID testing done at this time  Note for patient to return to work on Monday  Diagnoses and all orders for this visit:    Diarrhea, unspecified type  -     COVID Only- Office Collect    Other fatigue  -     COVID Only- Office Collect    Viral syndrome            Subjective:        Patient ID: Marta Daily is a 60 y o  female  Patient is here with nausea, diarrhea, fevers, chills, rhinorrhea postnasal drip since Monday  The following portions of the patient's history were reviewed and updated as appropriate: allergies, current medications, past family history, past medical history, past social history, past surgical history and problem list       Review of Systems   Constitutional: Positive for chills, fatigue and fever  HENT: Negative  Eyes: Negative  Respiratory: Positive for cough  Cardiovascular: Negative  Gastrointestinal: Negative  Endocrine: Negative  Genitourinary: Negative  Musculoskeletal: Negative  Skin: Negative  Allergic/Immunologic: Negative  Neurological: Negative  Hematological: Negative  Psychiatric/Behavioral: Negative  Objective:      BMI Counseling: There is no height or weight on file to calculate BMI  The BMI is above normal  Nutrition recommendations include decreasing portion sizes  Rationale for BMI follow-up plan is due to patient being overweight or obese  LMP 08/01/2000 (Approximate)          Physical Exam  Vitals and nursing note reviewed  Constitutional:       General: She is not in acute distress  Appearance: She is ill-appearing  She is not toxic-appearing or diaphoretic  HENT:      Head: Normocephalic and atraumatic  Right Ear: Tympanic membrane, ear canal and external ear normal  There is no impacted cerumen  Left Ear: Tympanic membrane, ear canal and external ear normal  There is no impacted cerumen  Nose: Rhinorrhea present   No congestion  Mouth/Throat:      Mouth: Mucous membranes are moist       Pharynx: Oropharyngeal exudate present  No posterior oropharyngeal erythema  Eyes:      General: No scleral icterus  Right eye: No discharge  Left eye: No discharge  Extraocular Movements: Extraocular movements intact  Conjunctiva/sclera: Conjunctivae normal       Pupils: Pupils are equal, round, and reactive to light  Neck:      Vascular: No carotid bruit  Cardiovascular:      Rate and Rhythm: Normal rate and regular rhythm  Pulses: Normal pulses  Heart sounds: Normal heart sounds  No murmur heard  No friction rub  No gallop  Pulmonary:      Effort: Pulmonary effort is normal  No respiratory distress  Breath sounds: Normal breath sounds  No stridor  No wheezing, rhonchi or rales  Chest:      Chest wall: No tenderness  Musculoskeletal:         General: No swelling, tenderness, deformity or signs of injury  Normal range of motion  Cervical back: Normal range of motion and neck supple  No rigidity  No muscular tenderness  Right lower leg: No edema  Left lower leg: No edema  Lymphadenopathy:      Cervical: No cervical adenopathy  Skin:     General: Skin is warm and dry  Capillary Refill: Capillary refill takes less than 2 seconds  Coloration: Skin is not jaundiced  Findings: No bruising, erythema, lesion or rash  Neurological:      Mental Status: She is alert and oriented to person, place, and time  Mental status is at baseline  Cranial Nerves: No cranial nerve deficit  Sensory: No sensory deficit  Motor: No weakness  Coordination: Coordination normal       Gait: Gait normal    Psychiatric:         Mood and Affect: Mood normal          Behavior: Behavior normal          Thought Content:  Thought content normal          Judgment: Judgment normal

## 2022-02-16 NOTE — LETTER
February 16, 2022     Patient: Tata Randle   YOB: 1965   Date of Visit: 2/16/2022       To Whom it May Concern:    Tata Randle is under my professional care  She was seen in my office on 2/16/2022  She may return to work on 02/21/2022  If you have any questions or concerns, please don't hesitate to call           Sincerely,          Elieser Horton DO        CC: No Recipients

## 2022-02-17 LAB — SARS-COV-2 RNA RESP QL NAA+PROBE: NEGATIVE

## 2022-02-21 ENCOUNTER — OFFICE VISIT (OUTPATIENT)
Dept: PHYSICAL THERAPY | Facility: MEDICAL CENTER | Age: 57
End: 2022-02-21
Payer: OTHER MISCELLANEOUS

## 2022-02-21 DIAGNOSIS — Z87.81 S/P ORIF (OPEN REDUCTION INTERNAL FIXATION) FRACTURE: Primary | ICD-10-CM

## 2022-02-21 DIAGNOSIS — Z98.890 S/P ORIF (OPEN REDUCTION INTERNAL FIXATION) FRACTURE: Primary | ICD-10-CM

## 2022-02-21 DIAGNOSIS — S92.354K CLOSED NONDISPLACED FRACTURE OF FIFTH METATARSAL BONE OF RIGHT FOOT WITH NONUNION: ICD-10-CM

## 2022-02-21 PROCEDURE — 97110 THERAPEUTIC EXERCISES: CPT | Performed by: PHYSICAL THERAPIST

## 2022-02-21 PROCEDURE — 97112 NEUROMUSCULAR REEDUCATION: CPT | Performed by: PHYSICAL THERAPIST

## 2022-02-21 NOTE — PROGRESS NOTES
Discharge Summary    Today's date: 2022  Patient name: Malia Mejia  : 1965  MRN: [id number]  Referring provider: Elizabeth Carlson DPM  Dx:   Encounter Diagnosis     ICD-10-CM    1  S/P ORIF (open reduction internal fixation) fracture  Z98 890     Z87 81    2  Closed nondisplaced fracture of fifth metatarsal bone of right foot with nonunion  S92 354K                   Subjective: Patient reports that she is pleased with her overall progress  She is now able to go up 4-5 stairs, and she is able to come down approx  6 steps coming forward  She reports that she has been cleared to perform sedentary duties at work, but she has not been cleared to return to full duty yet  She has increased her distance of walking since her initial visit, and she feels confident with her home exercise program  She does note that she has some tingling at times in her ankle, but she is agreeable to be discharged to her HEP for continued strengthening  Objective: See treatment diary below    Outcome measure:  FOTO: 44/100 on 10/22/21, 61/100 on 22    Assessment: Patient has demonstrated steady progress with improving R ankle AROM and strength since initial visit, which has increased both her standing and walking tolerance  Patient has also consistently demonstrated improvements in increasing duration of Romberg/tandem/SLS time, which indicates that she has improved her balance  This has translated to functional improvements in her ability to perform some reciprocal stair negotiation at home  Patient has met all of her goals for PT except for returning to full participation in job duties  However, patient is independent in a comprehensive illustrated HEP for continued ankle strengthening and balance  Patient has reached her maximum benefit from formal PT services, and she is agreeable to be discharged to her HEP  Patient is now discharged to her HEP        Goals  Patient will be independent with home exercise program - met Patient will increase R ankle DF AROM to at least 5 degrees to improve quality of gait mechanics  - met  Patient will increase R ankle strength to at least 4+/5 to be able to ambulate longer distances  - met  Patient will exceed MCID on FOTO to demonstrate improved function  - met  Patient will be able to ascend/descend stairs  - met (able to go up 4-5, able to come down   Patient will return to job duties with modifications as necessary - in progress (cleared to perform sedentary work; 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 clients yet; independent in illustrated [facility] for continued balance/strengthening)  Patient will be able to manage symptoms independently  - met      Plan: Discharge to [facility]       Precautions: s/p R 5th metatarsal ORIF (DOS: 6/11/21)- WBAT (NO CAM BOOT), hx of seizures, hx of DVT (x2), hx of lumbar fracture (L1-L4), hx of L shoulder arthroscopy      Manuals 1/13 1/17 1/20 1/24 1/27 1/31 2/3 2/10 2/21   R ankle PROM KP KP np np np np np     R 1st MTP EXT PROM KP KP np np np np np                             Neuro Re-Ed            Romberg x5 EC to fatigue (CGA) x5 EC to fatigue airex (CGA) x5 EC to fatigue airex (CGA) x5 EC to fatigue airex (CGA) x5 EC to fatigue airex (CGA) x5 EC to fatigue airex (CGA) x5 EC to fatigue airex (CGA) x5 EC to fatigue airex (CGA) x5 EC to fatigue airex (CGA)   Tandem x3 ea EO (CGA) x3 ea EO airex (CGA) x5 ea EO airex (CGA) x5 ea EO airex (CGA) x5 ea EO airex (CGA) x5 ea EO airex (CGA) x5 ea EO airex (CGA) x5 ea EO airex (CGA) x5 ea EO airex (CGA)   SLS 5x to fatigue no UE assist (CGA) 5x to fatigue no UE assist (CGA) 5x to fatigue no UE assist (CGA) 5x to fatigue no UE assist (CGA) 5x to fatigue no UE assist (CGA) 5x to fatigue no UE assist (CGA) 5x to fatigue no UE assist (CGA) 5x to fatigue no UE assist (CGA) 5x to fatigue no UE assist (CGA)                                       Ther Ex            Rec bike EUCEDA 5 min 5 min 5 min 5 min 5 min 5 min 5 min 5 min 5 min   Strap gastroc stretch HEP HEP HEP         Ankle DF/PF AROM HEP HEP HEP         Ankle INV/EV AROM HEP HEP HEP         Seated heel raises 30x stand 30x stand 30x stand 30x stand 30x stand 30x stand 30x stand 30x stand 30x stand   Standing toe raises 30x 30x 30x 30x 30x  30x 30x 30x 30x   Mini squats x30 X30, wall ball NV x10 wall ball x20 wall ball x16 wall ball x20 wall ball x35 wall ball x35 wall ball x35 wall ball   Step ups x20 R, x10 L L2 x30 R, x15 L L2 x30 R L2 x30 R L2, x20 L L2 x30 R L2, x20 L L2 x30 R L2, x30 L2 x30 R L2, x20 L L2 x30 R L2, x22 L2 x30 R L2, x30 L L2   Step downs  NV x10 no riser x10 no riser x10 no riser x15 no riser x15 no riser x30 no riser x30 no riser   Leg press x20 DL 70# x25 DL 70#, SL NV x30 DL 70#, x10 SL 30# x30 DL 80#, x20 SL 40# x30 DL 80#, x20 SL 40# x30 DL 80#, x30 SL 40# x30 DL 90#, x30 SL 45# x30 DL 90#, x30 SL 45# x30 DL 70#, x30 SL 45#   HEP education and instruction            Ther Activity                                    Gait Training                        Modalities

## 2022-03-07 ENCOUNTER — TELEPHONE (OUTPATIENT)
Dept: PODIATRY | Facility: CLINIC | Age: 57
End: 2022-03-07

## 2022-03-07 ENCOUNTER — OFFICE VISIT (OUTPATIENT)
Dept: PODIATRY | Facility: CLINIC | Age: 57
End: 2022-03-07
Payer: OTHER MISCELLANEOUS

## 2022-03-07 VITALS
BODY MASS INDEX: 41.4 KG/M2 | SYSTOLIC BLOOD PRESSURE: 120 MMHG | HEART RATE: 67 BPM | DIASTOLIC BLOOD PRESSURE: 80 MMHG | WEIGHT: 205 LBS

## 2022-03-07 DIAGNOSIS — R26.2 AMBULATORY DYSFUNCTION: Primary | ICD-10-CM

## 2022-03-07 DIAGNOSIS — S92.901K NONUNION OF FRACTURE OF FOOT, RIGHT: ICD-10-CM

## 2022-03-07 PROCEDURE — 99214 OFFICE O/P EST MOD 30 MIN: CPT | Performed by: PODIATRIST

## 2022-03-07 NOTE — PROGRESS NOTES
This patient was seen on 3/7/22  My role is Foot , Ankle, and Wound Specialist    SUBJECTIVE    Chief Complaint:  S/P metatarsal fracture     Patient ID: Wendy Marks is a 60 y o  female  Sofia Per is known to me from surgical treatment of a George fracture  She's still working PT but now stating her main issue is lower extremity weakness referencing generalized weakness in her legs, knees, etc  She admits some of this predated her injury  I asked her if she can go to full time and she said "I don't know"  The following portions of the patient's history were reviewed and updated as appropriate: allergies, current medications, past family history, past medical history, past social history, past surgical history and problem list     Review of Systems   Constitutional: Negative  Respiratory: Negative  Musculoskeletal: Positive for gait problem  OBJECTIVE      /80   Pulse 67   Wt 93 kg (205 lb)   LMP 08/01/2000 (Approximate)   BMI 41 40 kg/m²     Foot/Ankle Musculoskeletal Exam    General      Neurological: alert      General additional comments: I note no inflammation at the prior surgical site  Physical Exam  Vitals and nursing note reviewed  Constitutional:       General: She is not in acute distress  Appearance: Normal appearance  She is not ill-appearing, toxic-appearing or diaphoretic  HENT:      Head: Normocephalic and atraumatic  Pulmonary:      Effort: Pulmonary effort is normal       Breath sounds: Normal breath sounds  Musculoskeletal:      Comments: I note no inflammation at the prior surgical site  Skin:     General: Skin is warm and dry  Neurological:      General: No focal deficit present  Mental Status: She is alert and oriented to person, place, and time  ASSESSMENT     Diagnoses and all orders for this visit:    Ambulatory dysfunction  -     Ambulatory referral to [address] ;  Future    Nonunion of fracture of foot, right         Problem List Items Addressed This Visit        Musculoskeletal and Integument    Nonunion of fracture of foot, right      Other Visit Diagnoses     Ambulatory dysfunction    -  Primary    Relevant Orders    Ambulatory referral to [facility]   At this point, I don't feel her foot is the primary issue  The fracture is fully healed on xrays and isn't her primary complaint  Now that he main issue is more generalized weakness I don't think I'm the appropriate physician going forward  I am going to refer her to a physiatrist to evaluate her and take over  I gave her a note to keep her out of full time until 4/15  It should be noted that at this point until 4/15 she is out of work due to her lower extremity weakness  At that point (4/15), she should have been able to have her physiatry consultation and I was very clear with her that any extension of her part time status will be by that physician as I'm deeming her foot fracture healed  She will return to see me on a PRN basis  18

## 2022-03-07 NOTE — TELEPHONE ENCOUNTER
Patient called  She needs the name of a phyfiatrist   She cannot get an appointment  as [employer] insists on scheduling her for physical therapy         Please advise  Thank you

## 2022-03-08 ENCOUNTER — TELEPHONE (OUTPATIENT)
Dept: PODIATRY | Facility: CLINIC | Age: 57
End: 2022-03-08

## 2022-03-21 ENCOUNTER — TELEPHONE (OUTPATIENT)
Dept: PODIATRY | Facility: CLINIC | Age: 57
End: 2022-03-21

## 2022-03-21 NOTE — TELEPHONE ENCOUNTER
Roula Viera faxed over a letter asking for clarification on certain things that were on the workman comp papers  She has not heard anything as of yet  Please advise      [phone]  Ph  [phone]  Fax  Attn:  Roula Viera

## 2022-03-21 NOTE — TELEPHONE ENCOUNTER
Clarence Acuña is working on it please be advise it takes 10 to 15 business days for paper work get done

## 2022-05-16 ENCOUNTER — OFFICE VISIT (OUTPATIENT)
Dept: FAMILY MEDICINE CLINIC | Facility: CLINIC | Age: 57
End: 2022-05-16
Payer: COMMERCIAL

## 2022-05-16 DIAGNOSIS — J40 BRONCHITIS: Primary | ICD-10-CM

## 2022-05-16 PROCEDURE — 99213 OFFICE O/P EST LOW 20 MIN: CPT | Performed by: FAMILY MEDICINE

## 2022-05-16 RX ORDER — PREDNISONE 20 MG/1
40 TABLET ORAL DAILY
Qty: 10 TABLET | Refills: 0 | Status: SHIPPED | OUTPATIENT
Start: 2022-05-16 | End: 2022-05-21

## 2022-05-16 NOTE — PROGRESS NOTES
Assessment/Plan:  Patient use prednisone as directed for bronchitis/cough  Patient tested positive for COVID 7 days ago  Guidance given in this regard  Patient has had symptoms for 13 days  Patient to call us back in the next few days if not seeing improvement or worsening of symptoms  Diagnoses and all orders for this visit:    Bronchitis  -     predniSONE 20 mg tablet; Take 2 tablets (40 mg total) by mouth in the morning for 5 days  Subjective:        Patient ID: Anders Copeland is a 60 y o  female  Patient is here with ongoing cough sore throat fatigue and body aches  Patient started with symptoms roughly 13 days ago  Patient symptoms have resolved other than those just mention  No fever  Patient tested positive for COVID 1 week ago  Patient is noticing improvement  The following portions of the patient's history were reviewed and updated as appropriate: allergies, current medications, past family history, past medical history, past social history, past surgical history and problem list       Review of Systems   Constitutional: Positive for fatigue  HENT: Positive for sore throat  Eyes: Negative  Respiratory: Positive for cough  Cardiovascular: Negative  Gastrointestinal: Negative  Endocrine: Negative  Genitourinary: Negative  Musculoskeletal: Positive for arthralgias  Skin: Negative  Allergic/Immunologic: Negative  Neurological: Negative  Hematological: Negative  Psychiatric/Behavioral: Negative  Objective:        Depression Screening and Follow-up Plan: Patient was screened for depression during today's encounter  They screened negative with a PHQ-2 score of 0  LMP 08/01/2000 (Approximate)          Physical Exam  Vitals and nursing note reviewed  Constitutional:       General: She is not in acute distress  Appearance: Normal appearance  She is not ill-appearing, toxic-appearing or diaphoretic     HENT:      Head: Normocephalic and atraumatic  Right Ear: Tympanic membrane, ear canal and external ear normal  There is no impacted cerumen  Left Ear: Tympanic membrane, ear canal and external ear normal  There is no impacted cerumen  Nose: Nose normal  No congestion or rhinorrhea  Mouth/Throat:      Mouth: Mucous membranes are moist       Pharynx: Oropharyngeal exudate present  No posterior oropharyngeal erythema  Eyes:      General: No scleral icterus  Right eye: No discharge  Left eye: No discharge  Extraocular Movements: Extraocular movements intact  Conjunctiva/sclera: Conjunctivae normal       Pupils: Pupils are equal, round, and reactive to light  Neck:      Vascular: No carotid bruit  Cardiovascular:      Rate and Rhythm: Normal rate and regular rhythm  Pulses: Normal pulses  Heart sounds: Normal heart sounds  No murmur heard  No friction rub  No gallop  Pulmonary:      Effort: Pulmonary effort is normal  No respiratory distress  Breath sounds: Normal breath sounds  No stridor  No wheezing, rhonchi or rales  Chest:      Chest wall: No tenderness  Musculoskeletal:         General: No swelling, tenderness, deformity or signs of injury  Normal range of motion  Cervical back: Normal range of motion and neck supple  No rigidity  No muscular tenderness  Right lower leg: No edema  Left lower leg: No edema  Lymphadenopathy:      Cervical: No cervical adenopathy  Skin:     General: Skin is warm and dry  Capillary Refill: Capillary refill takes less than 2 seconds  Coloration: Skin is not jaundiced  Findings: No bruising, erythema, lesion or rash  Neurological:      Mental Status: She is alert and oriented to person, place, and time  Mental status is at baseline  Cranial Nerves: No cranial nerve deficit  Sensory: No sensory deficit  Motor: No weakness        Coordination: Coordination normal       Gait: Gait normal    Psychiatric:         Mood and Affect: Mood normal          Behavior: Behavior normal          Thought Content:  Thought content normal          Judgment: Judgment normal

## 2022-05-23 ENCOUNTER — TELEPHONE (OUTPATIENT)
Dept: FAMILY MEDICINE CLINIC | Facility: CLINIC | Age: 57
End: 2022-05-23

## 2022-05-23 DIAGNOSIS — J40 BRONCHITIS: Primary | ICD-10-CM

## 2022-05-23 RX ORDER — LEVOFLOXACIN 500 MG/1
500 TABLET, FILM COATED ORAL EVERY 24 HOURS
Qty: 7 TABLET | Refills: 0 | Status: SHIPPED | OUTPATIENT
Start: 2022-05-23 | End: 2022-05-30

## 2022-05-23 NOTE — TELEPHONE ENCOUNTER
Pt is still not feelling well , cough is still present and worse   C/o URI symptoms   Pt said she was told to call back if symptoms were any worse , that you would send in another rx

## 2022-06-02 ENCOUNTER — TELEPHONE (OUTPATIENT)
Dept: NEUROLOGY | Facility: CLINIC | Age: 57
End: 2022-06-02

## 2022-06-10 DIAGNOSIS — R26.2 AMBULATORY DYSFUNCTION: Primary | ICD-10-CM

## 2022-06-14 ENCOUNTER — CONSULT (OUTPATIENT)
Dept: NEUROLOGY | Facility: CLINIC | Age: 57
End: 2022-06-14
Payer: COMMERCIAL

## 2022-06-14 VITALS
SYSTOLIC BLOOD PRESSURE: 130 MMHG | BODY MASS INDEX: 40.38 KG/M2 | OXYGEN SATURATION: 97 % | HEIGHT: 59 IN | HEART RATE: 76 BPM | WEIGHT: 200.3 LBS | DIASTOLIC BLOOD PRESSURE: 90 MMHG | TEMPERATURE: 96.3 F

## 2022-06-14 DIAGNOSIS — M72.2 PLANTAR FASCIITIS, BILATERAL: ICD-10-CM

## 2022-06-14 DIAGNOSIS — R26.2 AMBULATORY DYSFUNCTION: ICD-10-CM

## 2022-06-14 DIAGNOSIS — M25.571 PAIN IN JOINT INVOLVING RIGHT ANKLE AND FOOT: Primary | ICD-10-CM

## 2022-06-14 DIAGNOSIS — R20.8 ALLODYNIA: ICD-10-CM

## 2022-06-14 PROBLEM — M79.671 CHRONIC PAIN IN RIGHT FOOT: Status: ACTIVE | Noted: 2022-06-14

## 2022-06-14 PROBLEM — G89.29 CHRONIC PAIN IN RIGHT FOOT: Status: ACTIVE | Noted: 2022-06-14

## 2022-06-14 PROCEDURE — 99244 OFF/OP CNSLTJ NEW/EST MOD 40: CPT | Performed by: PHYSICAL MEDICINE & REHABILITATION

## 2022-06-14 RX ORDER — GABAPENTIN 100 MG/1
100 CAPSULE ORAL EVERY 8 HOURS SCHEDULED
Qty: 90 CAPSULE | Refills: 0 | Status: SHIPPED | OUTPATIENT
Start: 2022-06-14
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
## 2022-06-14 NOTE — PATIENT INSTRUCTIONS
Two podiatrists you can look at for evaluation for custom orthotics - Dr Abad Bey and Dr Lito Felder [facility])  2  Topical treatments:   - You can try lidocaine patches  These work you wear them 12 hours on, 12 hours off  These are the over the counter    - You can try voltaren gel (up to 4x a day) - that's over the counter  1g    - We discussed capsaicin cream, but this is less practical for her at this time  3  Oral medications:    - We can try gabapentin if you are interested, and can start with a low dose  4  If interested, we can see if we can get therapy covered for desensitization and prevention of CRPS

## 2022-06-14 NOTE — PROGRESS NOTES
Review of Systems   Constitutional: Negative  Negative for appetite change and fever  HENT: Negative  Negative for hearing loss, tinnitus, trouble swallowing and voice change  Eyes: Negative  Negative for photophobia and pain  Respiratory: Negative  Negative for shortness of breath  Cardiovascular: Negative  Negative for palpitations  Gastrointestinal: Negative  Negative for nausea and vomiting  Endocrine: Negative  Negative for cold intolerance  Genitourinary: Negative  Negative for dysuria, frequency and urgency  Musculoskeletal: Positive for gait problem  Negative for myalgias and neck pain  Skin: Negative  Negative for rash  Neurological: Positive for weakness and numbness  Negative for dizziness, tremors, seizures, syncope, facial asymmetry, speech difficulty, light-headedness and headaches  Hematological: Negative  Does not bruise/bleed easily  Psychiatric/Behavioral: Negative  Negative for confusion, hallucinations and sleep disturbance  All other systems reviewed and are negative

## 2022-07-02 ENCOUNTER — APPOINTMENT (OUTPATIENT)
Dept: LAB | Facility: HOSPITAL | Age: 57
End: 2022-07-02
Payer: COMMERCIAL

## 2022-07-02 DIAGNOSIS — M25.571 PAIN IN JOINT INVOLVING RIGHT ANKLE AND FOOT: ICD-10-CM

## 2022-07-02 DIAGNOSIS — R20.8 ALLODYNIA: ICD-10-CM

## 2022-07-02 LAB
ANION GAP SERPL CALCULATED.3IONS-SCNC: 10 MMOL/L (ref 4–13)
BUN SERPL-MCNC: 17 MG/DL (ref 5–25)
CALCIUM SERPL-MCNC: 8.6 MG/DL (ref 8.3–10.1)
CHLORIDE SERPL-SCNC: 106 MMOL/L (ref 100–108)
CO2 SERPL-SCNC: 28 MMOL/L (ref 21–32)
CREAT SERPL-MCNC: 1 MG/DL (ref 0.6–1.3)
GFR SERPL CREATININE-BSD FRML MDRD: 61 ML/MIN/1.73SQ M
GLUCOSE P FAST SERPL-MCNC: 123 MG/DL (ref 65–99)
POTASSIUM SERPL-SCNC: 4.2 MMOL/L (ref 3.5–5.3)
SODIUM SERPL-SCNC: 144 MMOL/L (ref 136–145)

## 2022-07-02 PROCEDURE — 36415 COLL VENOUS BLD VENIPUNCTURE: CPT

## 2022-07-02 PROCEDURE — 80048 BASIC METABOLIC PNL TOTAL CA: CPT

## 2022-07-11 DIAGNOSIS — R20.8 ALLODYNIA: ICD-10-CM

## 2022-07-11 RX ORDER — GABAPENTIN 100 MG/1
CAPSULE ORAL
Qty: 90 CAPSULE | Refills: 0 | OUTPATIENT
Start: 2022-07-11

## 2022-07-11 NOTE — TELEPHONE ENCOUNTER
Called patient to review her use of gabapentin  She has not really been taking it, as she has been waiting for her seizure doctor to call her back  Since last seen, her father has been doing much worse, and has been in and out of the emergency room  He is now in [facility] in rehab, and she is very overwhelmed with all his care  I told her that once she feels comfortable to start, she can start gabapentin whenever she wants with the ramp up schedule I gave her  Once she has been able to implement some of the strategies we've discussed I can follow-up with her, but for now, I told her she can hold off on follow-up until she has a bit more bandwidth to take care of herself      Va Laguna MD  Physical Medicine and Rehabilitation

## 2022-08-11 ENCOUNTER — VBI (OUTPATIENT)
Dept: ADMINISTRATIVE | Facility: OTHER | Age: 57
End: 2022-08-11

## 2022-08-29 ENCOUNTER — ANNUAL EXAM (OUTPATIENT)
Dept: OBGYN CLINIC | Facility: CLINIC | Age: 57
End: 2022-08-29
Payer: COMMERCIAL

## 2022-08-29 VITALS
HEIGHT: 59 IN | SYSTOLIC BLOOD PRESSURE: 120 MMHG | BODY MASS INDEX: 41 KG/M2 | WEIGHT: 203.4 LBS | DIASTOLIC BLOOD PRESSURE: 78 MMHG

## 2022-08-29 DIAGNOSIS — Z01.419 ROUTINE GYNECOLOGICAL EXAMINATION: Primary | ICD-10-CM

## 2022-08-29 DIAGNOSIS — Z12.31 ENCOUNTER FOR SCREENING MAMMOGRAM FOR MALIGNANT NEOPLASM OF BREAST: ICD-10-CM

## 2022-08-29 PROCEDURE — 99396 PREV VISIT EST AGE 40-64: CPT | Performed by: PHYSICIAN ASSISTANT

## 2022-08-29 PROCEDURE — 0503F POSTPARTUM CARE VISIT: CPT | Performed by: PHYSICIAN ASSISTANT
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
## 2022-09-26 ENCOUNTER — HOSPITAL ENCOUNTER (OUTPATIENT)
Dept: CT IMAGING | Facility: HOSPITAL | Age: 57
Discharge: HOME | End: 2022-09-26
Payer: COMMERCIAL

## 2022-09-26 ENCOUNTER — HOSPITAL ENCOUNTER (OUTPATIENT)
Facility: HOSPITAL | Age: 57
Discharge: HOME | End: 2022-09-28
Attending: EMERGENCY MEDICINE | Admitting: SURGERY
Payer: COMMERCIAL

## 2022-09-26 ENCOUNTER — APPOINTMENT (OUTPATIENT)
Dept: LAB | Facility: HOSPITAL | Age: 57
End: 2022-09-26
Payer: COMMERCIAL

## 2022-09-26 ENCOUNTER — OFFICE VISIT (OUTPATIENT)
Dept: FAMILY MEDICINE CLINIC | Facility: CLINIC | Age: 57
End: 2022-09-26
Payer: COMMERCIAL

## 2022-09-26 VITALS
OXYGEN SATURATION: 98 % | DIASTOLIC BLOOD PRESSURE: 80 MMHG | WEIGHT: 192.2 LBS | SYSTOLIC BLOOD PRESSURE: 126 MMHG | BODY MASS INDEX: 38.75 KG/M2 | HEIGHT: 59 IN | HEART RATE: 78 BPM | TEMPERATURE: 97.8 F

## 2022-09-26 DIAGNOSIS — K37 APPENDICITIS: Primary | ICD-10-CM

## 2022-09-26 DIAGNOSIS — R10.31 RIGHT LOWER QUADRANT PAIN: Primary | ICD-10-CM

## 2022-09-26 DIAGNOSIS — R10.31 RIGHT LOWER QUADRANT PAIN: ICD-10-CM

## 2022-09-26 DIAGNOSIS — K42.9 UMBILICAL HERNIA WITHOUT OBSTRUCTION AND WITHOUT GANGRENE: ICD-10-CM

## 2022-09-26 DIAGNOSIS — K35.30 ACUTE APPENDICITIS WITH LOCALIZED PERITONITIS, WITHOUT PERFORATION, ABSCESS, OR GANGRENE: ICD-10-CM

## 2022-09-26 LAB
ALBUMIN SERPL BCP-MCNC: 3.6 G/DL (ref 3.5–5)
ALP SERPL-CCNC: 92 U/L (ref 46–116)
ALT SERPL W P-5'-P-CCNC: 34 U/L (ref 12–78)
ANION GAP SERPL CALCULATED.3IONS-SCNC: 11 MMOL/L (ref 4–13)
AST SERPL W P-5'-P-CCNC: 17 U/L (ref 5–45)
BACTERIA UR QL AUTO: ABNORMAL /HPF
BASOPHILS # BLD AUTO: 0.06 THOUSANDS/ΜL (ref 0–0.1)
BASOPHILS NFR BLD AUTO: 1 % (ref 0–1)
BILIRUB SERPL-MCNC: 0.34 MG/DL (ref 0.2–1)
BILIRUB UR QL STRIP: ABNORMAL
BUN SERPL-MCNC: 21 MG/DL (ref 5–25)
CALCIUM SERPL-MCNC: 9.7 MG/DL (ref 8.3–10.1)
CHLORIDE SERPL-SCNC: 101 MMOL/L (ref 96–108)
CLARITY UR: ABNORMAL
CO2 SERPL-SCNC: 27 MMOL/L (ref 21–32)
COLOR UR: YELLOW
CREAT SERPL-MCNC: 1.1 MG/DL (ref 0.6–1.3)
EOSINOPHIL # BLD AUTO: 0.14 THOUSAND/ΜL (ref 0–0.61)
EOSINOPHIL NFR BLD AUTO: 2 % (ref 0–6)
ERYTHROCYTE [DISTWIDTH] IN BLOOD BY AUTOMATED COUNT: 12.6 % (ref 11.6–15.1)
GFR SERPL CREATININE-BSD FRML MDRD: 55 ML/MIN/1.73SQ M
GLUCOSE SERPL-MCNC: 103 MG/DL (ref 65–140)
GLUCOSE UR STRIP-MCNC: NEGATIVE MG/DL
HCT VFR BLD AUTO: 43.1 % (ref 34.8–46.1)
HGB BLD-MCNC: 14 G/DL (ref 11.5–15.4)
HGB UR QL STRIP.AUTO: ABNORMAL
IMM GRANULOCYTES # BLD AUTO: 0.03 THOUSAND/UL (ref 0–0.2)
IMM GRANULOCYTES NFR BLD AUTO: 0 % (ref 0–2)
KETONES UR STRIP-MCNC: ABNORMAL MG/DL
LEUKOCYTE ESTERASE UR QL STRIP: ABNORMAL
LIPASE SERPL-CCNC: 173 U/L (ref 73–393)
LYMPHOCYTES # BLD AUTO: 2.16 THOUSANDS/ΜL (ref 0.6–4.47)
LYMPHOCYTES NFR BLD AUTO: 23 % (ref 14–44)
MCH RBC QN AUTO: 28.9 PG (ref 26.8–34.3)
MCHC RBC AUTO-ENTMCNC: 32.5 G/DL (ref 31.4–37.4)
MCV RBC AUTO: 89 FL (ref 82–98)
MONOCYTES # BLD AUTO: 0.67 THOUSAND/ΜL (ref 0.17–1.22)
MONOCYTES NFR BLD AUTO: 7 % (ref 4–12)
NEUTROPHILS # BLD AUTO: 6.25 THOUSANDS/ΜL (ref 1.85–7.62)
NEUTS SEG NFR BLD AUTO: 67 % (ref 43–75)
NITRITE UR QL STRIP: NEGATIVE
NON-SQ EPI CELLS URNS QL MICRO: ABNORMAL /HPF
NRBC BLD AUTO-RTO: 0 /100 WBCS
PH UR STRIP.AUTO: 6 [PH]
PLATELET # BLD AUTO: 274 THOUSANDS/UL (ref 149–390)
PMV BLD AUTO: 9.2 FL (ref 8.9–12.7)
POTASSIUM SERPL-SCNC: 3.8 MMOL/L (ref 3.5–5.3)
PROT SERPL-MCNC: 8.8 G/DL (ref 6.4–8.4)
PROT UR STRIP-MCNC: NEGATIVE MG/DL
RBC # BLD AUTO: 4.85 MILLION/UL (ref 3.81–5.12)
RBC #/AREA URNS AUTO: ABNORMAL /HPF
SARS-COV-2 AG UPPER RESP QL IA: NORMAL
SODIUM SERPL-SCNC: 139 MMOL/L (ref 135–147)
SP GR UR STRIP.AUTO: >=1.03 (ref 1–1.03)
UROBILINOGEN UR QL STRIP.AUTO: 0.2 E.U./DL
WBC # BLD AUTO: 9.31 THOUSAND/UL (ref 4.31–10.16)
WBC #/AREA URNS AUTO: ABNORMAL /HPF

## 2022-09-26 PROCEDURE — 96375 TX/PRO/DX INJ NEW DRUG ADDON: CPT

## 2022-09-26 PROCEDURE — 99204 OFFICE O/P NEW MOD 45 MIN: CPT | Performed by: SURGERY

## 2022-09-26 PROCEDURE — 87636 SARSCOV2 & INF A&B AMP PRB: CPT | Performed by: PHYSICIAN ASSISTANT

## 2022-09-26 PROCEDURE — 36415 COLL VENOUS BLD VENIPUNCTURE: CPT

## 2022-09-26 PROCEDURE — 80053 COMPREHEN METABOLIC PANEL: CPT

## 2022-09-26 PROCEDURE — 99214 OFFICE O/P EST MOD 30 MIN: CPT | Performed by: FAMILY MEDICINE

## 2022-09-26 PROCEDURE — 99285 EMERGENCY DEPT VISIT HI MDM: CPT

## 2022-09-26 PROCEDURE — 96365 THER/PROPH/DIAG IV INF INIT: CPT

## 2022-09-26 PROCEDURE — 96376 TX/PRO/DX INJ SAME DRUG ADON: CPT

## 2022-09-26 PROCEDURE — 85025 COMPLETE CBC W/AUTO DIFF WBC: CPT

## 2022-09-26 PROCEDURE — 99285 EMERGENCY DEPT VISIT HI MDM: CPT | Performed by: PHYSICIAN ASSISTANT

## 2022-09-26 PROCEDURE — 83690 ASSAY OF LIPASE: CPT

## 2022-09-26 PROCEDURE — 74177 CT ABD & PELVIS W/CONTRAST: CPT

## 2022-09-26 PROCEDURE — 87811 SARS-COV-2 COVID19 W/OPTIC: CPT | Performed by: PHYSICIAN ASSISTANT

## 2022-09-26 PROCEDURE — G1004 CDSM NDSC: HCPCS

## 2022-09-26 PROCEDURE — 81001 URINALYSIS AUTO W/SCOPE: CPT | Performed by: FAMILY MEDICINE

## 2022-09-26 RX ORDER — HYDROMORPHONE HCL/PF 1 MG/ML
0.5 SYRINGE (ML) INJECTION
Status: DISCONTINUED | OUTPATIENT
Start: 2022-09-26 | End: 2022-09-28 | Stop reason: HOSPADM

## 2022-09-26 RX ORDER — METRONIDAZOLE 500 MG/100ML
500 INJECTION, SOLUTION INTRAVENOUS EVERY 8 HOURS
Status: DISCONTINUED | OUTPATIENT
Start: 2022-09-26 | End: 2022-09-28 | Stop reason: HOSPADM

## 2022-09-26 RX ORDER — ACETAMINOPHEN 325 MG/1
650 TABLET ORAL EVERY 6 HOURS PRN
Status: DISCONTINUED | OUTPATIENT
Start: 2022-09-26 | End: 2022-09-28 | Stop reason: HOSPADM

## 2022-09-26 RX ORDER — PROMETHAZINE HYDROCHLORIDE 25 MG/ML
12.5 INJECTION, SOLUTION INTRAMUSCULAR; INTRAVENOUS ONCE
Status: COMPLETED | OUTPATIENT
Start: 2022-09-27 | End: 2022-09-27

## 2022-09-26 RX ORDER — OXYCODONE HYDROCHLORIDE 10 MG/1
10 TABLET ORAL EVERY 4 HOURS PRN
Status: DISCONTINUED | OUTPATIENT
Start: 2022-09-26 | End: 2022-09-28 | Stop reason: HOSPADM

## 2022-09-26 RX ORDER — CEFAZOLIN SODIUM 2 G/50ML
2000 SOLUTION INTRAVENOUS
Status: COMPLETED | OUTPATIENT
Start: 2022-09-27 | End: 2022-09-27

## 2022-09-26 RX ORDER — ONDANSETRON 2 MG/ML
4 INJECTION INTRAMUSCULAR; INTRAVENOUS ONCE
Status: COMPLETED | OUTPATIENT
Start: 2022-09-26 | End: 2022-09-26

## 2022-09-26 RX ORDER — CEFAZOLIN SODIUM 2 G/50ML
2000 SOLUTION INTRAVENOUS EVERY 8 HOURS
Status: DISCONTINUED | OUTPATIENT
Start: 2022-09-26 | End: 2022-09-28 | Stop reason: HOSPADM

## 2022-09-26 RX ORDER — ONDANSETRON 2 MG/ML
4 INJECTION INTRAMUSCULAR; INTRAVENOUS EVERY 6 HOURS PRN
Status: DISCONTINUED | OUTPATIENT
Start: 2022-09-26 | End: 2022-09-27

## 2022-09-26 RX ORDER — SODIUM CHLORIDE, SODIUM LACTATE, POTASSIUM CHLORIDE, CALCIUM CHLORIDE 600; 310; 30; 20 MG/100ML; MG/100ML; MG/100ML; MG/100ML
125 INJECTION, SOLUTION INTRAVENOUS CONTINUOUS
Status: DISCONTINUED | OUTPATIENT
Start: 2022-09-26 | End: 2022-09-28

## 2022-09-26 RX ORDER — ENOXAPARIN SODIUM 100 MG/ML
40 INJECTION SUBCUTANEOUS DAILY
Status: DISCONTINUED | OUTPATIENT
Start: 2022-09-27 | End: 2022-09-28 | Stop reason: HOSPADM

## 2022-09-26 RX ORDER — TOPIRAMATE 25 MG/1
100 TABLET ORAL 2 TIMES DAILY
Status: DISCONTINUED | OUTPATIENT
Start: 2022-09-26 | End: 2022-09-27 | Stop reason: SDUPTHER

## 2022-09-26 RX ORDER — TOPIRAMATE 100 MG/1
100 TABLET, FILM COATED ORAL 2 TIMES DAILY
Status: DISCONTINUED | OUTPATIENT
Start: 2022-09-27 | End: 2022-09-26

## 2022-09-26 RX ORDER — METRONIDAZOLE 500 MG/100ML
500 INJECTION, SOLUTION INTRAVENOUS
Status: ACTIVE | OUTPATIENT
Start: 2022-09-27 | End: 2022-09-28

## 2022-09-26 RX ORDER — OXYCODONE HYDROCHLORIDE 5 MG/1
5 TABLET ORAL EVERY 4 HOURS PRN
Status: DISCONTINUED | OUTPATIENT
Start: 2022-09-26 | End: 2022-09-28 | Stop reason: HOSPADM

## 2022-09-26 RX ADMIN — CEFAZOLIN SODIUM 2000 MG: 2 SOLUTION INTRAVENOUS at 23:00

## 2022-09-26 RX ADMIN — HYDROMORPHONE HYDROCHLORIDE 0.5 MG: 1 INJECTION, SOLUTION INTRAMUSCULAR; INTRAVENOUS; SUBCUTANEOUS at 23:00

## 2022-09-26 RX ADMIN — ONDANSETRON 4 MG: 2 INJECTION INTRAMUSCULAR; INTRAVENOUS at 22:36

## 2022-09-26 RX ADMIN — IOHEXOL 100 ML: 350 INJECTION, SOLUTION INTRAVENOUS at 19:39

## 2022-09-26 RX ADMIN — TOPIRAMATE 100 MG: 100 TABLET, FILM COATED ORAL at 22:58

## 2022-09-26 RX ADMIN — ONDANSETRON 4 MG: 2 INJECTION INTRAMUSCULAR; INTRAVENOUS at 21:22

## 2022-09-26 RX ADMIN — MORPHINE SULFATE 2 MG: 2 INJECTION, SOLUTION INTRAMUSCULAR; INTRAVENOUS at 21:23

## 2022-09-26 NOTE — PROGRESS NOTES
Assessment/Plan: lab and records reviewed  Patient go for CT scan the right lower quadrant to rule appendicitis  Patient go for laboratory studies also in this regard also patient with umbilical hernia  Patient may need see surgeon for removal in the near future  Guidance given regarding going to emergency room if worsens  Diagnoses and all orders for this visit:    Right lower quadrant pain  -     CT abdomen pelvis w contrast; Future  -     Comprehensive metabolic panel; Future  -     CBC and differential; Future  -     Lipase; Future  -     Urinalysis with microscopic    Umbilical hernia without obstruction and without gangrene  -     Comprehensive metabolic panel; Future  -     CBC and differential; Future  -     Lipase; Future  -     Urinalysis with microscopic            Subjective:        Patient ID: Shakeel Cohen is a 59 y o  female  Patient is here with nausea as well as some diarrhea abdominal cramping bloating which began last Thursday  Patient using Vicks  No vomiting  Patient was feeling extremely warm and did have chills  No URI symptoms  Patient having at least 7-8 bowel movements per day  Nausea  Associated symptoms include chills, fatigue, a fever and nausea  Diarrhea   Associated symptoms include chills and a fever  The following portions of the patient's history were reviewed and updated as appropriate: allergies, current medications, past family history, past medical history, past social history, past surgical history and problem list       Review of Systems   Constitutional: Positive for chills, fatigue and fever  HENT: Negative  Eyes: Negative  Respiratory: Negative  Cardiovascular: Negative  Gastrointestinal: Positive for diarrhea and nausea  Endocrine: Negative  Genitourinary: Negative  Musculoskeletal: Negative  Skin: Negative  Allergic/Immunologic: Negative  Neurological: Negative  Hematological: Negative  Psychiatric/Behavioral: Negative  Objective:               /80 (BP Location: Right arm, Patient Position: Sitting, Cuff Size: Standard)   Pulse 78   Temp 97 8 °F (36 6 °C) (Temporal)   Ht 4' 11" (1 499 m)   Wt 87 2 kg (192 lb 3 2 oz)   LMP 08/01/2000 (Approximate)   SpO2 98%   BMI 38 82 kg/m²          Physical Exam  Vitals and nursing note reviewed  Constitutional:       General: She is not in acute distress  Appearance: She is ill-appearing  She is not toxic-appearing or diaphoretic  HENT:      Head: Normocephalic and atraumatic  Right Ear: Tympanic membrane, ear canal and external ear normal  There is no impacted cerumen  Left Ear: Tympanic membrane, ear canal and external ear normal  There is no impacted cerumen  Nose: Nose normal  No congestion or rhinorrhea  Mouth/Throat:      Mouth: Mucous membranes are moist       Pharynx: No oropharyngeal exudate or posterior oropharyngeal erythema  Neck:      Vascular: No carotid bruit  Cardiovascular:      Rate and Rhythm: Normal rate and regular rhythm  Pulses: Normal pulses  Heart sounds: Normal heart sounds  No murmur heard  No friction rub  No gallop  Pulmonary:      Effort: Pulmonary effort is normal  No respiratory distress  Breath sounds: Normal breath sounds  No stridor  No wheezing, rhonchi or rales  Chest:      Chest wall: No tenderness  Abdominal:      General: Abdomen is flat  There is no distension  Tenderness: There is abdominal tenderness  There is guarding and rebound  Hernia: A hernia is present  Comments: Pain right lower quadrant with palpation  Patient with rebound  Musculoskeletal:         General: No swelling, tenderness, deformity or signs of injury  Normal range of motion  Cervical back: Normal range of motion and neck supple  No rigidity  No muscular tenderness  Right lower leg: No edema  Left lower leg: No edema     Lymphadenopathy: Cervical: No cervical adenopathy  Skin:     General: Skin is warm and dry  Capillary Refill: Capillary refill takes less than 2 seconds  Coloration: Skin is not jaundiced  Findings: No bruising, erythema, lesion or rash  Neurological:      Mental Status: She is alert and oriented to person, place, and time  Mental status is at baseline  Cranial Nerves: No cranial nerve deficit  Sensory: No sensory deficit  Motor: No weakness  Coordination: Coordination normal       Gait: Gait normal    Psychiatric:         Mood and Affect: Mood normal          Behavior: Behavior normal          Thought Content:  Thought content normal          Judgment: Judgment normal

## 2022-09-26 NOTE — Clinical Note
Case was discussed with General Surgery and the patient's admission status was agreed to be Admission Status: inpatient status to the service of Dr kumar

## 2022-09-27 ENCOUNTER — ANESTHESIA (OUTPATIENT)
Dept: PERIOP | Facility: HOSPITAL | Age: 57
End: 2022-09-27
Payer: COMMERCIAL

## 2022-09-27 ENCOUNTER — ANESTHESIA EVENT (OUTPATIENT)
Dept: PERIOP | Facility: HOSPITAL | Age: 57
End: 2022-09-27
Payer: COMMERCIAL

## 2022-09-27 PROBLEM — T88.59XA DELAYED EMERGENCE FROM ANESTHESIA: Status: ACTIVE | Noted: 2022-09-27

## 2022-09-27 PROBLEM — K35.80 ACUTE APPENDICITIS: Status: ACTIVE | Noted: 2022-09-27

## 2022-09-27 LAB
ANION GAP SERPL CALCULATED.3IONS-SCNC: 10 MMOL/L (ref 4–13)
BASOPHILS # BLD AUTO: 0.04 THOUSANDS/ΜL (ref 0–0.1)
BASOPHILS NFR BLD AUTO: 1 % (ref 0–1)
BUN SERPL-MCNC: 17 MG/DL (ref 5–25)
CALCIUM SERPL-MCNC: 8.8 MG/DL (ref 8.3–10.1)
CHLORIDE SERPL-SCNC: 103 MMOL/L (ref 96–108)
CO2 SERPL-SCNC: 27 MMOL/L (ref 21–32)
CREAT SERPL-MCNC: 1 MG/DL (ref 0.6–1.3)
EOSINOPHIL # BLD AUTO: 0.15 THOUSAND/ΜL (ref 0–0.61)
EOSINOPHIL NFR BLD AUTO: 2 % (ref 0–6)
ERYTHROCYTE [DISTWIDTH] IN BLOOD BY AUTOMATED COUNT: 12.7 % (ref 11.6–15.1)
FLUAV RNA RESP QL NAA+PROBE: NEGATIVE
FLUBV RNA RESP QL NAA+PROBE: NEGATIVE
GFR SERPL CREATININE-BSD FRML MDRD: 61 ML/MIN/1.73SQ M
GLUCOSE P FAST SERPL-MCNC: 106 MG/DL (ref 65–99)
GLUCOSE SERPL-MCNC: 106 MG/DL (ref 65–140)
HCT VFR BLD AUTO: 39.7 % (ref 34.8–46.1)
HGB BLD-MCNC: 12.7 G/DL (ref 11.5–15.4)
IMM GRANULOCYTES # BLD AUTO: 0.02 THOUSAND/UL (ref 0–0.2)
IMM GRANULOCYTES NFR BLD AUTO: 0 % (ref 0–2)
LYMPHOCYTES # BLD AUTO: 1.4 THOUSANDS/ΜL (ref 0.6–4.47)
LYMPHOCYTES NFR BLD AUTO: 21 % (ref 14–44)
MCH RBC QN AUTO: 28.5 PG (ref 26.8–34.3)
MCHC RBC AUTO-ENTMCNC: 32 G/DL (ref 31.4–37.4)
MCV RBC AUTO: 89 FL (ref 82–98)
MONOCYTES # BLD AUTO: 0.5 THOUSAND/ΜL (ref 0.17–1.22)
MONOCYTES NFR BLD AUTO: 8 % (ref 4–12)
NEUTROPHILS # BLD AUTO: 4.51 THOUSANDS/ΜL (ref 1.85–7.62)
NEUTS SEG NFR BLD AUTO: 68 % (ref 43–75)
NRBC BLD AUTO-RTO: 0 /100 WBCS
PLATELET # BLD AUTO: 227 THOUSANDS/UL (ref 149–390)
PMV BLD AUTO: 9.3 FL (ref 8.9–12.7)
POTASSIUM SERPL-SCNC: 3.5 MMOL/L (ref 3.5–5.3)
RBC # BLD AUTO: 4.45 MILLION/UL (ref 3.81–5.12)
SARS-COV-2 RNA RESP QL NAA+PROBE: NEGATIVE
SODIUM SERPL-SCNC: 140 MMOL/L (ref 135–147)
WBC # BLD AUTO: 6.62 THOUSAND/UL (ref 4.31–10.16)

## 2022-09-27 PROCEDURE — 80048 BASIC METABOLIC PNL TOTAL CA: CPT | Performed by: SURGERY

## 2022-09-27 PROCEDURE — 88304 TISSUE EXAM BY PATHOLOGIST: CPT | Performed by: PATHOLOGY

## 2022-09-27 PROCEDURE — 96375 TX/PRO/DX INJ NEW DRUG ADDON: CPT

## 2022-09-27 PROCEDURE — NC001 PR NO CHARGE: Performed by: SURGERY

## 2022-09-27 PROCEDURE — 85025 COMPLETE CBC W/AUTO DIFF WBC: CPT | Performed by: SURGERY

## 2022-09-27 PROCEDURE — 96367 TX/PROPH/DG ADDL SEQ IV INF: CPT

## 2022-09-27 PROCEDURE — 44970 LAPAROSCOPY APPENDECTOMY: CPT | Performed by: SURGERY

## 2022-09-27 RX ORDER — PROPOFOL 10 MG/ML
INJECTION, EMULSION INTRAVENOUS AS NEEDED
Status: DISCONTINUED | OUTPATIENT
Start: 2022-09-27 | End: 2022-09-27

## 2022-09-27 RX ORDER — MAGNESIUM HYDROXIDE 1200 MG/15ML
LIQUID ORAL AS NEEDED
Status: DISCONTINUED | OUTPATIENT
Start: 2022-09-27 | End: 2022-09-27 | Stop reason: HOSPADM

## 2022-09-27 RX ORDER — FENTANYL CITRATE 50 UG/ML
50 INJECTION, SOLUTION INTRAMUSCULAR; INTRAVENOUS
Status: DISCONTINUED | OUTPATIENT
Start: 2022-09-27 | End: 2022-09-27 | Stop reason: HOSPADM

## 2022-09-27 RX ORDER — FENTANYL CITRATE 50 UG/ML
INJECTION, SOLUTION INTRAMUSCULAR; INTRAVENOUS AS NEEDED
Status: DISCONTINUED | OUTPATIENT
Start: 2022-09-27 | End: 2022-09-27

## 2022-09-27 RX ORDER — TOPIRAMATE 100 MG/1
100 TABLET, FILM COATED ORAL 2 TIMES DAILY
Status: DISCONTINUED | OUTPATIENT
Start: 2022-09-27 | End: 2022-09-28 | Stop reason: HOSPADM

## 2022-09-27 RX ORDER — ONDANSETRON 2 MG/ML
4 INJECTION INTRAMUSCULAR; INTRAVENOUS EVERY 6 HOURS PRN
Status: DISCONTINUED | OUTPATIENT
Start: 2022-09-27 | End: 2022-09-27 | Stop reason: HOSPADM

## 2022-09-27 RX ORDER — ONDANSETRON 2 MG/ML
INJECTION INTRAMUSCULAR; INTRAVENOUS AS NEEDED
Status: DISCONTINUED | OUTPATIENT
Start: 2022-09-27 | End: 2022-09-27

## 2022-09-27 RX ORDER — NEOSTIGMINE METHYLSULFATE 1 MG/ML
INJECTION INTRAVENOUS AS NEEDED
Status: DISCONTINUED | OUTPATIENT
Start: 2022-09-27 | End: 2022-09-27

## 2022-09-27 RX ORDER — ONDANSETRON 2 MG/ML
4 INJECTION INTRAMUSCULAR; INTRAVENOUS EVERY 4 HOURS PRN
Status: DISCONTINUED | OUTPATIENT
Start: 2022-09-27 | End: 2022-09-28 | Stop reason: HOSPADM

## 2022-09-27 RX ORDER — MIDAZOLAM HYDROCHLORIDE 2 MG/2ML
INJECTION, SOLUTION INTRAMUSCULAR; INTRAVENOUS AS NEEDED
Status: DISCONTINUED | OUTPATIENT
Start: 2022-09-27 | End: 2022-09-27

## 2022-09-27 RX ORDER — SODIUM CHLORIDE 9 MG/ML
125 INJECTION, SOLUTION INTRAVENOUS CONTINUOUS
Status: DISCONTINUED | OUTPATIENT
Start: 2022-09-27 | End: 2022-09-28

## 2022-09-27 RX ORDER — DEXAMETHASONE SODIUM PHOSPHATE 10 MG/ML
INJECTION, SOLUTION INTRAMUSCULAR; INTRAVENOUS AS NEEDED
Status: DISCONTINUED | OUTPATIENT
Start: 2022-09-27 | End: 2022-09-27

## 2022-09-27 RX ORDER — ROCURONIUM BROMIDE 10 MG/ML
INJECTION, SOLUTION INTRAVENOUS AS NEEDED
Status: DISCONTINUED | OUTPATIENT
Start: 2022-09-27 | End: 2022-09-27

## 2022-09-27 RX ORDER — LIDOCAINE HYDROCHLORIDE 20 MG/ML
INJECTION, SOLUTION EPIDURAL; INFILTRATION; INTRACAUDAL; PERINEURAL AS NEEDED
Status: DISCONTINUED | OUTPATIENT
Start: 2022-09-27 | End: 2022-09-27

## 2022-09-27 RX ORDER — POTASSIUM CHLORIDE 20 MEQ/1
40 TABLET, EXTENDED RELEASE ORAL ONCE
Status: COMPLETED | OUTPATIENT
Start: 2022-09-27 | End: 2022-09-27

## 2022-09-27 RX ORDER — GLYCOPYRROLATE 0.2 MG/ML
INJECTION INTRAMUSCULAR; INTRAVENOUS AS NEEDED
Status: DISCONTINUED | OUTPATIENT
Start: 2022-09-27 | End: 2022-09-27

## 2022-09-27 RX ORDER — SODIUM CHLORIDE 9 MG/ML
INJECTION, SOLUTION INTRAVENOUS CONTINUOUS PRN
Status: DISCONTINUED | OUTPATIENT
Start: 2022-09-27 | End: 2022-09-27

## 2022-09-27 RX ADMIN — FENTANYL CITRATE 50 MCG: 50 INJECTION INTRAMUSCULAR; INTRAVENOUS at 15:04

## 2022-09-27 RX ADMIN — METRONIDAZOLE: 500 INJECTION, SOLUTION INTRAVENOUS at 14:30

## 2022-09-27 RX ADMIN — PROPOFOL 170 MG: 10 INJECTION, EMULSION INTRAVENOUS at 14:23

## 2022-09-27 RX ADMIN — POTASSIUM CHLORIDE 40 MEQ: 1500 TABLET, EXTENDED RELEASE ORAL at 13:39

## 2022-09-27 RX ADMIN — FENTANYL CITRATE 50 MCG: 50 INJECTION, SOLUTION INTRAMUSCULAR; INTRAVENOUS at 16:34

## 2022-09-27 RX ADMIN — PROMETHAZINE HYDROCHLORIDE 12.5 MG: 25 INJECTION INTRAMUSCULAR; INTRAVENOUS at 00:07

## 2022-09-27 RX ADMIN — METRONIDAZOLE 500 MG: 500 INJECTION, SOLUTION INTRAVENOUS at 06:45

## 2022-09-27 RX ADMIN — LIDOCAINE HYDROCHLORIDE 100 MG: 20 INJECTION, SOLUTION EPIDURAL; INFILTRATION; INTRACAUDAL; PERINEURAL at 14:23

## 2022-09-27 RX ADMIN — HYDROMORPHONE HYDROCHLORIDE 0.5 MG: 1 INJECTION, SOLUTION INTRAMUSCULAR; INTRAVENOUS; SUBCUTANEOUS at 17:21

## 2022-09-27 RX ADMIN — ROCURONIUM BROMIDE 10 MG: 50 INJECTION, SOLUTION INTRAVENOUS at 14:46

## 2022-09-27 RX ADMIN — OXYCODONE HYDROCHLORIDE 10 MG: 10 TABLET ORAL at 20:00

## 2022-09-27 RX ADMIN — DEXAMETHASONE SODIUM PHOSPHATE 5 MG: 10 INJECTION, SOLUTION INTRAMUSCULAR; INTRAVENOUS at 14:23

## 2022-09-27 RX ADMIN — NEOSTIGMINE METHYLSULFATE 3 MG: 1 INJECTION INTRAVENOUS at 15:33

## 2022-09-27 RX ADMIN — ONDANSETRON 4 MG: 2 INJECTION INTRAMUSCULAR; INTRAVENOUS at 15:24

## 2022-09-27 RX ADMIN — SODIUM CHLORIDE, SODIUM LACTATE, POTASSIUM CHLORIDE, AND CALCIUM CHLORIDE 125 ML/HR: .6; .31; .03; .02 INJECTION, SOLUTION INTRAVENOUS at 01:14

## 2022-09-27 RX ADMIN — HYDROMORPHONE HYDROCHLORIDE 0.5 MG: 1 INJECTION, SOLUTION INTRAMUSCULAR; INTRAVENOUS; SUBCUTANEOUS at 10:28

## 2022-09-27 RX ADMIN — FENTANYL CITRATE 50 MCG: 50 INJECTION INTRAMUSCULAR; INTRAVENOUS at 14:56

## 2022-09-27 RX ADMIN — GLYCOPYRROLATE 0.4 MG: 0.2 INJECTION, SOLUTION INTRAMUSCULAR; INTRAVENOUS at 15:33

## 2022-09-27 RX ADMIN — METRONIDAZOLE 500 MG: 500 INJECTION, SOLUTION INTRAVENOUS at 00:12

## 2022-09-27 RX ADMIN — ROCURONIUM BROMIDE 30 MG: 50 INJECTION, SOLUTION INTRAVENOUS at 14:23

## 2022-09-27 RX ADMIN — METRONIDAZOLE 500 MG: 500 INJECTION, SOLUTION INTRAVENOUS at 21:46

## 2022-09-27 RX ADMIN — MIDAZOLAM 2 MG: 1 INJECTION INTRAMUSCULAR; INTRAVENOUS at 14:18

## 2022-09-27 RX ADMIN — SODIUM CHLORIDE: 0.9 INJECTION, SOLUTION INTRAVENOUS at 14:28

## 2022-09-27 RX ADMIN — SODIUM CHLORIDE: 0.9 INJECTION, SOLUTION INTRAVENOUS at 15:23

## 2022-09-27 RX ADMIN — HYDROMORPHONE HYDROCHLORIDE 0.5 MG: 1 INJECTION, SOLUTION INTRAMUSCULAR; INTRAVENOUS; SUBCUTANEOUS at 06:18

## 2022-09-27 RX ADMIN — OXYCODONE 5 MG: 5 TABLET ORAL at 23:41

## 2022-09-27 RX ADMIN — FENTANYL CITRATE 50 MCG: 50 INJECTION INTRAMUSCULAR; INTRAVENOUS at 14:49

## 2022-09-27 RX ADMIN — HYDROMORPHONE HYDROCHLORIDE 0.5 MG: 1 INJECTION, SOLUTION INTRAMUSCULAR; INTRAVENOUS; SUBCUTANEOUS at 21:48

## 2022-09-27 RX ADMIN — FENTANYL CITRATE 50 MCG: 50 INJECTION INTRAMUSCULAR; INTRAVENOUS at 14:23

## 2022-09-27 RX ADMIN — TOPIRAMATE 100 MG: 100 TABLET, FILM COATED ORAL at 13:40

## 2022-09-27 RX ADMIN — CEFAZOLIN SODIUM 2000 MG: 2 SOLUTION INTRAVENOUS at 21:48

## 2022-09-27 RX ADMIN — CEFAZOLIN SODIUM 2000 MG: 2 SOLUTION INTRAVENOUS at 14:17

## 2022-09-27 RX ADMIN — CEFAZOLIN SODIUM 2000 MG: 2 SOLUTION INTRAVENOUS at 06:11

## 2022-09-27 NOTE — ED NOTES
Patient refusing pain medications at this time due to her practice in holistic medications   Patient instructed to ring call bell if pain increases     Katelyn Chambers RN  09/26/22 2053

## 2022-09-27 NOTE — PLAN OF CARE
Problem: Potential for Falls  Goal: Patient will remain free of falls  Description: INTERVENTIONS:  - Educate patient/family on patient safety including physical limitations  - Instruct patient to call for assistance with activity   - Consult OT/PT to assist with strengthening/mobility   - Keep Call bell within reach  - Keep bed low and locked with side rails adjusted as appropriate  - Keep care items and personal belongings within reach  - Initiate and maintain comfort rounds  - Make Fall Risk Sign visible to staff  - Offer Toileting every 2 Hours, in advance of need  - Initiate/Maintain bed alarm  - Obtain necessary fall risk management equipment: bed alarm, call bell, gripper socks  - Apply yellow socks and bracelet for high fall risk patients    Outcome: Progressing     Problem: PAIN - ADULT  Goal: Verbalizes/displays adequate comfort level or baseline comfort level  Description: Interventions:  - Encourage patient to monitor pain and request assistance  - Assess pain using appropriate pain scale  - Administer analgesics based on type and severity of pain and evaluate response  - Implement non-pharmacological measures as appropriate and evaluate response  - Consider cultural and social influences on pain and pain management  - Notify physician/advanced practitioner if interventions unsuccessful or patient reports new pain  Outcome: Progressing     Problem: COPING  Goal: Will report anxiety at manageable levels  Description: INTERVENTIONS:  - Administer medication as ordered  - Teach and encourage coping skills  - Provide emotional support  - Assess patient/family for anxiety and ability to cope  Outcome: Progressing     Problem: GASTROINTESTINAL - ADULT  Goal: Minimal or absence of nausea and/or vomiting  Description: INTERVENTIONS:  - Administer IV fluids if ordered to ensure adequate hydration  - Administer ordered antiemetic medications as needed  - Provide nonpharmacologic comfort measures as appropriate  - Advance diet as tolerated, if ordered  - Consider nutrition services referral to assist patient with adequate nutrition and appropriate food choices  Outcome: Progressing

## 2022-09-27 NOTE — PROGRESS NOTES
Progress Note - General Surgery   [facility] 59 y o  female MRN: [id number]  Unit/Bed#: E2 -01 Encounter: [id number]    Assessment:  64-year-old female with acute appendicitis    Plan:   OR for lap appy   NPO   IVF   ABX   Please TigerText on call [facility] surgery resident or AP if questions    Subjective/Objective     Subjective:   No acute events overnight  Pain improved  No vomiting  Isolated episode of nausea last night  Improved with p r n  Crestone Kappa Pertinent review of systems as above  All other review of systems negative  Objective:    Blood pressure 111/65, pulse 75, temperature (!) 96 9 °F (36 1 °C), temperature source Temporal, resp  rate 18, height 4' 10 25" (1 48 m), weight 86 7 kg (191 lb 2 2 oz), last menstrual period 08/01/2000, SpO2 98 %, not currently breastfeeding  ,Body mass index is 39 61 kg/m²  Intake/Output Summary (Last 24 hours) at 9/27/2022 0509  Last data filed at 9/27/2022 0042  Gross per 24 hour   Intake 150 ml   Output --   Net 150 ml       Invasive Devices  Report    Peripheral Intravenous Line  Duration           Peripheral IV 09/26/22 Left Antecubital 1 day                Physical Exam:   Gen:  NAD  HEENT: NCAT  MMM  CV: well perfused  Lungs: Normal respiratory effort  Abd: soft, tender to palpation in RLQ  Skin: warm/ dry  Extremities: no peripheral edema, no clubbing or cyanosis  Neuro: AxO x3      Results from last 7 days   Lab Units 09/26/22  1758   WBC Thousand/uL 9 31   HEMOGLOBIN g/dL 14 0   HEMATOCRIT % 43 1   PLATELETS Thousands/uL 274     Results from last 7 days   Lab Units 09/26/22  1758   POTASSIUM mmol/L 3 8   CHLORIDE mmol/L 101   CO2 mmol/L 27   BUN mg/dL 21   CREATININE mg/dL 1 10   CALCIUM mg/dL 9 7            I have personally reviewed pertinent films in PACS      Medications:   Scheduled Meds:  Current Facility-Administered Medications   Medication Dose Route Frequency Provider Last Rate    acetaminophen  650 mg Oral Q6H PRN MD Helder France cefazolin  2,000 mg Intravenous Q8H Radha Krishnamurthy MD Stopped (09/26/22 2330)    cefazolin  2,000 mg Intravenous On Call To Peggy King MD      enoxaparin  40 mg Subcutaneous Daily Radha Krishnamurthy MD      HYDROmorphone  0 5 mg Intravenous Q3H PRN Radha Krishnamurthy, MD      lactated ringers  125 mL/hr Intravenous Continuous Radha Krishnamurthy,  mL/hr (09/27/22 0114)    metroNIDAZOLE  500 mg Intravenous Q8H Radha Krishnamurthy MD Stopped (09/27/22 9318)    metroNIDAZOLE  500 mg Intravenous On Call To Peggy King MD      ondansetron  4 mg Intravenous Q6H PRN Radha Krishnamurthy, MD      oxyCODONE  10 mg Oral Q4H PRN Radha Krishnamurthy, MD      oxyCODONE  5 mg Oral Q4H PRN Radha Krishnamurthy, MD      topiramate  100 mg Oral BID Radha Krishnamurthy MD       Continuous Infusions:lactated ringers, 125 mL/hr, Last Rate: 125 mL/hr (09/27/22 0114)      PRN Meds:  acetaminophen, 650 mg, Q6H PRN  HYDROmorphone, 0 5 mg, Q3H PRN  ondansetron, 4 mg, Q6H PRN  oxyCODONE, 10 mg, Q4H PRN  oxyCODONE, 5 mg, Q4H PRN      VTE Pharmacologic Prophylaxis: Enoxaparin (Lovenox)  VTE Mechanical Prophylaxis: sequential compression device
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
## 2022-09-27 NOTE — ANESTHESIA PREPROCEDURE EVALUATION
Procedure:  APPENDECTOMY LAPAROSCOPIC (N/A Abdomen)    Relevant Problems   ANESTHESIA   (+) Delayed emergence from anesthesia      CARDIO   (+) Acute deep vein thrombosis (DVT) of popliteal vein of right lower extremity (HCC)   (+) Hyperlipidemia   (+) Precordial pain      GI/HEPATIC   (+) Esophageal reflux      MUSCULOSKELETAL   (+) Lumbar spondylosis   (+) Spondylosis of cervical region without myelopathy or radiculopathy      NEURO/PSYCH   (+) Chronic pain in right foot   (+) Seizure (HCC)      PULMONARY  chronic cough, worse at certain times of year      Respiratory   (+) Bronchitis      Digestive   (+) Acute appendicitis        Physical Exam    Airway    Mallampati score: II  TM Distance: >3 FB  Neck ROM: full     Dental   upper dentures,     Cardiovascular  Cardiovascular exam normal    Pulmonary  Pulmonary exam normal     Other Findings        Anesthesia Plan  ASA Score- 2 Emergent    Anesthesia Type- general with ASA Monitors  Additional Monitors:   Airway Plan: ETT  Plan Factors-    Chart reviewed  Existing labs reviewed  Patient summary reviewed  Induction- intravenous  Postoperative Plan-     Informed Consent- Anesthetic plan and risks discussed with patient

## 2022-09-27 NOTE — ANESTHESIA POSTPROCEDURE EVALUATION
Post-Op Assessment Note    CV Status:  Stable  Pain Score: 2    Pain management: adequate     Mental Status:  Alert and awake   Hydration Status:  Euvolemic   PONV Controlled:  Controlled   Airway Patency:  Patent      Post Op Vitals Reviewed: Yes      Staff: Anesthesiologist         No complications documented      BP      Temp     Pulse     Resp      SpO2      /64   Pulse 72   Temp (!) 97 2 °F (36 2 °C)   Resp 18   Ht 4' 10 25" (1 48 m)   Wt 86 7 kg (191 lb 2 2 oz)   LMP 08/01/2000 (Approximate)   SpO2 96%   BMI 39 61 kg/m²

## 2022-09-27 NOTE — OP NOTE
OPERATIVE REPORT  PATIENT NAME: Maureen Mccallum    :  1965  MRN: [id number]  Pt Location: [facility] ROOM 06    SURGERY DATE: 2022    Surgeon(s) and Role:     * Andi Lundborg, MD - Primary     * Pj Garcia MD - Assisting    Preop Diagnosis:  Appendicitis [K37]    Post-Op Diagnosis Codes:     * Appendicitis [K37]    Procedure(s) (LRB):  APPENDECTOMY LAPAROSCOPIC (N/A)    Specimen(s):  ID Type Source Tests Collected by Time Destination   1 : appendix Tissue Appendix TISSUE EXAM Andi Lundborg, MD 2022 1449        Estimated Blood Loss:   Minimal    Drains:  * No LDAs found *    Anesthesia Type:   General    Operative Indications:  Appendicitis [K37]    Operative Findings:  Significant appendical inflammation    Complications:   None    Procedure and Technique:  Patient was identified by name/birthday, transferred and secured to OR table, prepped and draped in the usual sterile fashion  Time-out was held per protocol  Infraumbilical vertical incision was made after infiltrating local anesthesia, abdomen was entered via yakov technique, placing two vicryl stay sutures in the fascia  Pneumoperitoneum was achieved without difficulty  Two additional ports were placed in the suprapubic area and left lower quadrant, with local anesthesia  No entry trauma was seen on survey of the abdomen  A piece of omentum adhered to the abdominal wall was bluntly taken down  The inflamed appendix was located without difficulty  Blunt dissection was used to free it from the surrounding bowel it was adhered to  There was a significant amount of inflammation and inflammatory tissue requiring dissection  A window was made at the base of the appendix, blue load stapler was used to transect the appendix at the base  Harmonic scalpel was used to transect the mesoappendix and control a small amount of bleeding  The staple line and area of mesoappendix transection were identified, no bleeding was seen   Abdomen and pelvis were irrigated and suctioned dry  Appendix was placed in specimen bag, removed from the abdomen, pneumoperitoneum was released  All port sites were irrigated and dried  An additional figure of eight suture was placed with vicryl, and was teid down along with the stay sutures  Incisions were closed with 4-0 Monocryl in a subcuticular fashion  Wounds were dressed with glue  Patient was awakened from anesthesia, extubated, and was taken to PACU uneventfully  Dr Adrianne Masterson and I were present and scrubbed for the entire procedure      Patient Disposition:  PACU      This procedure was not performed to treat colon cancer through resection      SIGNATURE: [unfilled]  DATE: September 27, 2022  TIME: 3:42 PM

## 2022-09-27 NOTE — PLAN OF CARE
Problem: Nutrition/Hydration-ADULT  Goal: Nutrient/Hydration intake appropriate for improving, restoring or maintaining nutritional needs  Description: Monitor and assess patient's nutrition/hydration status for malnutrition  Collaborate with interdisciplinary team and initiate plan and interventions as ordered  Monitor patient's weight and dietary intake as ordered or per policy  Utilize nutrition screening tool and intervene as necessary  Determine patient's food preferences and provide high-protein, high-caloric foods as appropriate       INTERVENTIONS:  - Monitor oral intake, urinary output, labs, and treatment plans  - Assess nutrition and hydration status and recommend course of action  - Evaluate amount of meals eaten   - Allow adequate time for meals  - Assess need for intravenous fluids  - Provide specific nutrition/hydration education as appropriate  - Include patient/family/caregiver in decisions related to nutrition  Outcome: Progressing
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
## 2022-09-28 VITALS
DIASTOLIC BLOOD PRESSURE: 53 MMHG | HEIGHT: 58 IN | OXYGEN SATURATION: 98 % | TEMPERATURE: 97.5 F | SYSTOLIC BLOOD PRESSURE: 98 MMHG | BODY MASS INDEX: 40.12 KG/M2 | RESPIRATION RATE: 18 BRPM | WEIGHT: 191.14 LBS | HEART RATE: 72 BPM

## 2022-09-28 PROBLEM — T88.59XA DELAYED EMERGENCE FROM ANESTHESIA: Status: RESOLVED | Noted: 2022-09-27 | Resolved: 2022-09-28

## 2022-09-28 PROBLEM — K35.80 ACUTE APPENDICITIS: Status: RESOLVED | Noted: 2022-09-27 | Resolved: 2022-09-28

## 2022-09-28 LAB
ANION GAP SERPL CALCULATED.3IONS-SCNC: 8 MMOL/L (ref 4–13)
BASOPHILS # BLD AUTO: 0.02 THOUSANDS/ΜL (ref 0–0.1)
BASOPHILS NFR BLD AUTO: 0 % (ref 0–1)
BUN SERPL-MCNC: 12 MG/DL (ref 5–25)
CALCIUM SERPL-MCNC: 8.4 MG/DL (ref 8.3–10.1)
CHLORIDE SERPL-SCNC: 105 MMOL/L (ref 96–108)
CO2 SERPL-SCNC: 28 MMOL/L (ref 21–32)
CREAT SERPL-MCNC: 1.07 MG/DL (ref 0.6–1.3)
EOSINOPHIL # BLD AUTO: 0 THOUSAND/ΜL (ref 0–0.61)
EOSINOPHIL NFR BLD AUTO: 0 % (ref 0–6)
ERYTHROCYTE [DISTWIDTH] IN BLOOD BY AUTOMATED COUNT: 12.7 % (ref 11.6–15.1)
GFR SERPL CREATININE-BSD FRML MDRD: 56 ML/MIN/1.73SQ M
GLUCOSE SERPL-MCNC: 109 MG/DL (ref 65–140)
HCT VFR BLD AUTO: 40.3 % (ref 34.8–46.1)
HGB BLD-MCNC: 12.7 G/DL (ref 11.5–15.4)
IMM GRANULOCYTES # BLD AUTO: 0.05 THOUSAND/UL (ref 0–0.2)
IMM GRANULOCYTES NFR BLD AUTO: 1 % (ref 0–2)
LYMPHOCYTES # BLD AUTO: 1.05 THOUSANDS/ΜL (ref 0.6–4.47)
LYMPHOCYTES NFR BLD AUTO: 11 % (ref 14–44)
MCH RBC QN AUTO: 28.9 PG (ref 26.8–34.3)
MCHC RBC AUTO-ENTMCNC: 31.5 G/DL (ref 31.4–37.4)
MCV RBC AUTO: 92 FL (ref 82–98)
MONOCYTES # BLD AUTO: 0.7 THOUSAND/ΜL (ref 0.17–1.22)
MONOCYTES NFR BLD AUTO: 7 % (ref 4–12)
NEUTROPHILS # BLD AUTO: 7.86 THOUSANDS/ΜL (ref 1.85–7.62)
NEUTS SEG NFR BLD AUTO: 81 % (ref 43–75)
NRBC BLD AUTO-RTO: 0 /100 WBCS
PLATELET # BLD AUTO: 279 THOUSANDS/UL (ref 149–390)
PMV BLD AUTO: 9.9 FL (ref 8.9–12.7)
POTASSIUM SERPL-SCNC: 4.2 MMOL/L (ref 3.5–5.3)
RBC # BLD AUTO: 4.4 MILLION/UL (ref 3.81–5.12)
SODIUM SERPL-SCNC: 141 MMOL/L (ref 135–147)
WBC # BLD AUTO: 9.68 THOUSAND/UL (ref 4.31–10.16)

## 2022-09-28 PROCEDURE — NC001 PR NO CHARGE: Performed by: SURGERY

## 2022-09-28 PROCEDURE — 85025 COMPLETE CBC W/AUTO DIFF WBC: CPT | Performed by: SURGERY

## 2022-09-28 PROCEDURE — 80048 BASIC METABOLIC PNL TOTAL CA: CPT | Performed by: SURGERY

## 2022-09-28 RX ADMIN — ENOXAPARIN SODIUM 40 MG: 40 INJECTION SUBCUTANEOUS at 08:58

## 2022-09-28 RX ADMIN — METRONIDAZOLE 500 MG: 500 INJECTION, SOLUTION INTRAVENOUS at 06:50

## 2022-09-28 RX ADMIN — TOPIRAMATE 100 MG: 100 TABLET, FILM COATED ORAL at 10:22

## 2022-09-28 RX ADMIN — OXYCODONE HYDROCHLORIDE 10 MG: 10 TABLET ORAL at 08:58

## 2022-09-28 RX ADMIN — CEFAZOLIN SODIUM 2000 MG: 2 SOLUTION INTRAVENOUS at 06:50

## 2022-09-28 NOTE — DISCHARGE SUMMARY
Discharge Summary - Martin Etienne 59 y o  female MRN: [id number]    Unit/Bed#: E2 -01 Encounter: [id number]    Admission Date:     Admitting Diagnosis: Appendicitis [K37]  Abdominal pain [R10 9]    HPI: Martin Etienne is a 59 y o  female with past medical history of seizures on Topamax --last seizure in 2015 who presents with acute onset abdominal pain since Thursday  Patient states that in addition to this pain she has had symptoms of intermittent nausea and diarrhea  She states whenever she eats goes right through her  She states the pain has been constant the right lower quadrant  Has been able to tolerate food up until yesterday  Last meal was yesterday afternoon  Has not eaten since then  Discussed further with her PCP who advised that she get a CT scan to rule out appendicitis  Labs were ordered as well  CT scan shows acute appendicitis with a 12 mm appendix with no appendicolith  Labs within normal limits  No leukocytosis  Patient denies any anti platelet or anticoagulant medications  Past surgical history includes laparoscopic cholecystectomy , tubal ligation, and pelvic sling  Procedures Performed:  9/27 Procedure(s) (LRB):  APPENDECTOMY LAPAROSCOPIC (N/A)    Summary of Hospital Course:  Patient was admitted on the evening of 9/26 findings of acute appendicitis  She was taken to the operating room on 09/27 and underwent a laparoscopic appendectomy without complication  She remained on antibiotics and her hospitalization  She tolerated advancement of diet  Pain was controlled  She was ready for discharge on 09/28      Complications:  None    Discharge Diagnosis:  Acute appendicitis status post laparoscopic appendectomy    Medical Problems             Resolved Problems  Date Reviewed: 9/26/2022          Resolved    * (Principal) Acute appendicitis 9/28/2022     Resolved by  Tuyet Guallpa PA-C    Delayed emergence from anesthesia 9/28/2022     Resolved by  Tuyet Guallpa PA-C Condition at Discharge: good         Discharge instructions/Information to patient and family:   See after visit summary for information provided to patient and family  Provisions for Follow-Up Care:  See after visit summary for information related to follow-up care and any pertinent home health orders  PCP: Vadim Abreu DO    Disposition: Home    Planned Readmission: No      Discharge Statement   I spent 5 minutes discharging the patient  This time was spent on the day of discharge  I had direct contact with the patient on the day of discharge  Additional documentation is required if more than 30 minutes were spent on discharge  Discharge Medications:  See after visit summary for reconciled discharge medications provided to patient and family

## 2022-09-28 NOTE — PROGRESS NOTES
Progress Note - General Surgery  : DEMET[facility] Surgery Resident on Tanya Etienneo 59 y o  female MRN: [id number]  Unit/Bed#: E2 MS Isak-01 Encounter: [id number]      Assessment:  59 y o  female POD 1 s/p lap appy       Plan:  Regular diet  IVF off  Abx until discharge  D/c home today if patient amenable  DVT ppx while admitted        Subjective: Abdominal pain improved, tolerated CLD, no flatus or BM yet, denies emesis      Objective:     Physical Exam:  GEN: NAD   Ab: Soft, appropriately mildly tender throughout, incisions CDI  Lung: Normal effort on RA  CV: RRR   Extrem: No CCE   Neuro: A+Ox3       I/O       09/26 0701  09/27 0700 09/27 0701 09/28 0700    I V  (mL/kg) 618 8 (7 1) 1000 (11 5)    IV Piggyback 200 250    Total Intake(mL/kg) 818 8 (9 4) 1250 (14 4)    Urine (mL/kg/hr)  600 (0 5)    Total Output  600    Net +818 8 +650                Lab, Imaging and other studies: I have personally reviewed pertinent reports    , CBC with diff:   Lab Results   Component Value Date    WBC 6 62 09/27/2022    HGB 12 7 09/27/2022    HCT 39 7 09/27/2022    MCV 89 09/27/2022     09/27/2022    MCH 28 5 09/27/2022    MCHC 32 0 09/27/2022    RDW 12 7 09/27/2022    MPV 9 3 09/27/2022    NRBC 0 09/27/2022   , BMP/CMP:   Lab Results   Component Value Date    SODIUM 140 09/27/2022    K 3 5 09/27/2022     09/27/2022    CO2 27 09/27/2022    BUN 17 09/27/2022    CREATININE 1 00 09/27/2022    CALCIUM 8 8 09/27/2022    EGFR 61 09/27/2022         VTE Pharmacologic Prophylaxis: Enoxaparin (Lovenox)        Rosa Elena Esteves MD  9/27/2022 9:07 PM

## 2022-09-28 NOTE — PLAN OF CARE
Problem: PAIN - ADULT  Goal: Verbalizes/displays adequate comfort level or baseline comfort level  Description: Interventions:  - Encourage patient to monitor pain and request assistance  - Assess pain using appropriate pain scale  - Administer analgesics based on type and severity of pain and evaluate response  - Implement non-pharmacological measures as appropriate and evaluate response  - Consider cultural and social influences on pain and pain management  - Notify physician/advanced practitioner if interventions unsuccessful or patient reports new pain  9/28/2022 1016 by Elzbieta Wills RN  Outcome: Progressing  9/28/2022 1016 by Elzbieta Wills RN  Outcome: Progressing     Problem: Knowledge Deficit  Goal: Patient demonstrates understanding of disease process, treatment plan, medications, and discharge instructions  Description: Complete learning assessment and assess knowledge base    Interventions:  - Provide teaching at level of understanding  - Provide teaching via preferred learning methods  9/28/2022 1016 by Elzbieta Wills RN  Outcome: Progressing  9/28/2022 1016 by Elzbieta Wills RN  Outcome: Progressing     Problem: DISCHARGE PLANNING  Goal: Discharge to home or other facility with appropriate resources  Description: INTERVENTIONS:  - Identify barriers to discharge w/patient  - Arrange for needed discharge resources   - Identify discharge learning needs  - Refer to Case Management Department for coordinating discharge planning if the patient needs post-hospital services based on physician/advanced practitioner order   9/28/2022 1016 by Elzbieta Wills RN  Outcome: Progressing  9/28/2022 1016 by Elzbieta Wills RN  Outcome: Progressing

## 2022-09-28 NOTE — QUICK NOTE
Post Op Check Note - Surgery Resident  [facility] 59 y o  female MRN: [id number]  Unit/Bed#: E2 -01 Encounter: [id number]    ASSESSMENT:  [facility] is a 59 y o  female who is status post lap appy    Subjective: Endorses abdominal pain, no nausea/emesis, is tolerating jello  Ambulated to restroom and urinated without difficulty    Physical Exam:  GEN: NAD  CV: RRR  Lung: Normal effort  Ab: Soft, expectedly somewhat tender throughout  Neuro: A+Ox3  Incisions: CDI    Blood pressure 136/76, pulse 80, temperature (!) 97 1 °F (36 2 °C), temperature source Temporal, resp  rate 16, height 4' 10 25" (1 48 m), weight 86 7 kg (191 lb 2 2 oz), last menstrual period 08/01/2000, SpO2 94 %, not currently breastfeeding  ,Body mass index is 39 61 kg/m²        Intake/Output Summary (Last 24 hours) at 9/27/2022 2016  Last data filed at 9/27/2022 1523  Gross per 24 hour   Intake 2068 75 ml   Output 600 ml   Net 1468 75 ml       Invasive Devices  Report    Peripheral Intravenous Line  Duration           Peripheral IV 09/26/22 Left Antecubital 1 day    Peripheral IV 09/27/22 Right Forearm <1 day                VTE Pharmacologic Prophylaxis: Enoxaparin (Lovenox)

## 2022-10-06 PROCEDURE — 88304 TISSUE EXAM BY PATHOLOGIST: CPT | Performed by: PATHOLOGY

## 2022-10-10 ENCOUNTER — OFFICE VISIT (OUTPATIENT)
Dept: SURGERY | Facility: HOSPITAL | Age: 57
End: 2022-10-10

## 2022-10-10 VITALS
SYSTOLIC BLOOD PRESSURE: 114 MMHG | BODY MASS INDEX: 41.27 KG/M2 | TEMPERATURE: 97.9 F | HEIGHT: 58 IN | RESPIRATION RATE: 16 BRPM | HEART RATE: 77 BPM | WEIGHT: 196.6 LBS | DIASTOLIC BLOOD PRESSURE: 74 MMHG

## 2022-10-10 DIAGNOSIS — Z98.890 POST-OPERATIVE STATE: Primary | ICD-10-CM

## 2022-10-10 PROCEDURE — 99024 POSTOP FOLLOW-UP VISIT: CPT | Performed by: PHYSICIAN ASSISTANT
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
## 2022-10-10 NOTE — LETTER
October 10, 2022     Patient: Lara Tran  YOB: 1965  Date of Visit: 10/10/2022      To Whom it May Concern:    Lara Tran is under my professional care  Stefani Tran was seen in my office on 10/10/2022  Please excuse Stefani Tran from work from 09/26/2022 until 10/27/2022  At that point the patient may return to work without restrictions  If you have any questions or concerns, please don't hesitate to call           Sincerely,          Monica Diallo PA-C        CC: No Recipients

## 2023-01-16 ENCOUNTER — HOSPITAL ENCOUNTER (OUTPATIENT)
Dept: MAMMOGRAPHY | Facility: MEDICAL CENTER | Age: 58
Discharge: HOME | End: 2023-01-16

## 2023-01-16 ENCOUNTER — TELEPHONE (OUTPATIENT)
Dept: OBGYN CLINIC | Facility: HOSPITAL | Age: 58
End: 2023-01-16

## 2023-01-16 VITALS — BODY MASS INDEX: 41.28 KG/M2 | HEIGHT: 58 IN | WEIGHT: 196.65 LBS

## 2023-01-16 DIAGNOSIS — Z12.31 ENCOUNTER FOR SCREENING MAMMOGRAM FOR MALIGNANT NEOPLASM OF BREAST: ICD-10-CM

## 2023-01-16 NOTE — TELEPHONE ENCOUNTER
Caller: Patient    Doctor/Office:    Call regarding :  Trying to reach neurology     Call was transferred to: neurology

## 2023-01-26 ENCOUNTER — VBI (OUTPATIENT)
Dept: ADMINISTRATIVE | Facility: OTHER | Age: 58
End: 2023-01-26

## 2023-02-21 ENCOUNTER — OFFICE VISIT (OUTPATIENT)
Dept: FAMILY MEDICINE CLINIC | Facility: CLINIC | Age: 58
End: 2023-02-21

## 2023-02-21 VITALS
DIASTOLIC BLOOD PRESSURE: 84 MMHG | WEIGHT: 198.2 LBS | SYSTOLIC BLOOD PRESSURE: 130 MMHG | HEART RATE: 94 BPM | BODY MASS INDEX: 41.6 KG/M2 | OXYGEN SATURATION: 96 % | TEMPERATURE: 99.4 F | HEIGHT: 58 IN

## 2023-02-21 DIAGNOSIS — D49.2 SKIN NEOPLASM: Primary | ICD-10-CM

## 2023-02-21 NOTE — PROGRESS NOTES
Assessment/Plan:       There are no diagnoses linked to this encounter  Subjective:        Patient ID: Christiano Cool is a 59 y o  female  Patient is here for skin lesions on neck left axilla and left leg over the past few months  Patient does notice some irritation  The following portions of the patient's history were reviewed and updated as appropriate: allergies, current medications, past family history, past medical history, past social history, past surgical history and problem list       Review of Systems   Constitutional: Negative  HENT: Negative  Eyes: Negative  Respiratory: Negative  Cardiovascular: Negative  Gastrointestinal: Negative  Endocrine: Negative  Genitourinary: Negative  Musculoskeletal: Negative  Skin: Positive for color change  Allergic/Immunologic: Negative  Neurological: Negative  Hematological: Negative  Psychiatric/Behavioral: Negative  Objective:      BMI Counseling: Body mass index is 41 42 kg/m²  The BMI is above normal  Nutrition recommendations include consuming healthier snacks  Exercise recommendations include exercising 3-5 times per week  Rationale for BMI follow-up plan is due to patient being overweight or obese  /84 (BP Location: Left arm, Patient Position: Sitting, Cuff Size: Standard)   Pulse 94   Temp 99 4 °F (37 4 °C) (Temporal)   Ht 4' 10" (1 473 m)   Wt 89 9 kg (198 lb 3 2 oz)   LMP 08/01/2000 (Approximate)   SpO2 96%   BMI 41 42 kg/m²          Physical Exam  Vitals and nursing note reviewed  Skin:     Capillary Refill: Capillary refill takes less than 2 seconds  Findings: Lesion present        Comments: Raised skin lesions on the neck

## 2023-03-09 ENCOUNTER — OFFICE VISIT (OUTPATIENT)
Dept: FAMILY MEDICINE CLINIC | Facility: CLINIC | Age: 58
End: 2023-03-09

## 2023-03-09 VITALS
WEIGHT: 197.4 LBS | DIASTOLIC BLOOD PRESSURE: 68 MMHG | OXYGEN SATURATION: 96 % | BODY MASS INDEX: 41.44 KG/M2 | HEART RATE: 95 BPM | SYSTOLIC BLOOD PRESSURE: 106 MMHG | HEIGHT: 58 IN | TEMPERATURE: 98.1 F

## 2023-03-09 DIAGNOSIS — S09.90XA TRAUMATIC INJURY OF HEAD, INITIAL ENCOUNTER: ICD-10-CM

## 2023-03-09 DIAGNOSIS — J01.00 ACUTE NON-RECURRENT MAXILLARY SINUSITIS: Primary | ICD-10-CM

## 2023-03-09 DIAGNOSIS — J40 BRONCHITIS: ICD-10-CM

## 2023-03-09 DIAGNOSIS — R05.9 COUGH, UNSPECIFIED TYPE: ICD-10-CM

## 2023-03-09 RX ORDER — AMOXICILLIN 500 MG
CAPSULE ORAL
COMMUNITY
Start: 2023-02-01

## 2023-03-09 RX ORDER — LEVOFLOXACIN 500 MG/1
500 TABLET, FILM COATED ORAL EVERY 24 HOURS
Qty: 7 TABLET | Refills: 0 | Status: SHIPPED | OUTPATIENT
Start: 2023-03-09 | End: 2023-03-13
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
## 2023-03-09 NOTE — LETTER
March 9, 2023     Patient: Ty Hanson  YOB: 1965  Date of Visit: 3/9/2023      To Whom it May Concern:    Ty Hanson is under my professional care  Padmaja Whitmore was seen in my office on 3/9/2023  Padmaja Whitmore      If you have any questions or concerns, please don't hesitate to call           Sincerely,          Vish Hartmann DO        CC:   No Recipients

## 2023-03-09 NOTE — LETTER
March 9, 2023     Patient: Concha Asher  YOB: 1965  Date of Visit: 3/9/2023      To Whom it May Concern:    Concha Asher is under my professional care  Deborah Barnett was seen in my office on 3/9/2023  Deborah Barnett may return to work on 03/13/2023  If you have any questions or concerns, please don't hesitate to call           Sincerely,          Nanci Barron, DO        CC: No Recipients

## 2023-03-10 LAB
FLUAV RNA RESP QL NAA+PROBE: NEGATIVE
FLUBV RNA RESP QL NAA+PROBE: NEGATIVE
SARS-COV-2 RNA RESP QL NAA+PROBE: NEGATIVE

## 2023-03-11 DIAGNOSIS — J01.00 ACUTE NON-RECURRENT MAXILLARY SINUSITIS: ICD-10-CM

## 2023-03-11 DIAGNOSIS — J40 BRONCHITIS: ICD-10-CM

## 2023-03-13 RX ORDER — LEVOFLOXACIN 500 MG/1
TABLET, FILM COATED ORAL
Qty: 7 TABLET | Refills: 0 | Status: SHIPPED | OUTPATIENT
Start: 2023-03-13 | End: 2023-03-20

## 2023-03-17 ENCOUNTER — TELEPHONE (OUTPATIENT)
Dept: FAMILY MEDICINE CLINIC | Facility: CLINIC | Age: 58
End: 2023-03-17

## 2023-03-17 NOTE — TELEPHONE ENCOUNTER
Pt stated she got 2 Rx's of Levaquin and took both doses  Sh is feeling somewhat better  She is c/o of clear mucus, watery eyes and she claims her face is swollen on one side  Any advice?

## 2023-04-06 ENCOUNTER — PROCEDURE VISIT (OUTPATIENT)
Dept: FAMILY MEDICINE CLINIC | Facility: CLINIC | Age: 58
End: 2023-04-06

## 2023-04-06 DIAGNOSIS — B37.2 YEAST INFECTION OF THE SKIN: ICD-10-CM

## 2023-04-06 DIAGNOSIS — D49.2 SKIN NEOPLASM: Primary | ICD-10-CM

## 2023-04-06 DIAGNOSIS — D49.9 NEOPLASM: ICD-10-CM

## 2023-04-06 RX ORDER — NYSTATIN 100000 [USP'U]/G
POWDER TOPICAL 4 TIMES DAILY
Qty: 30 G | Refills: 1 | Status: SHIPPED | OUTPATIENT
Start: 2023-04-06

## 2023-04-06 NOTE — PROGRESS NOTES
"Assessment/Plan: See procedure note  Patient will keep areas clean and dry and use Neosporin as directed  Specimen sent to pathology  Patient will follow-up in 2 weeks       Diagnoses and all orders for this visit:    Yeast infection of the skin  -     nystatin (MYCOSTATIN) powder; Apply topically 4 (four) times a day    Neoplasm  -     Tissue Exam; Future  -     Tissue Exam; Future  -     Tissue Exam  -     Cancel: Tissue Exam  -     Tissue Exam; Future  -     Tissue Exam            Subjective:        Patient ID: De Baldwin is a 59 y o  female  Patient is here for removal of skin lesions from right breast and left posterior leg  Patient noticed change in size and color  The following portions of the patient's history were reviewed and updated as appropriate: allergies, current medications, past family history, past medical history, past social history, past surgical history and problem list       Review of Systems   Skin: Positive for color change  Objective:        Depression Screening and Follow-up Plan: Clincally patient does not have depression  No treatment is required  LMP 08/01/2000 (Approximate)          Physical Exam  Vitals and nursing note reviewed  Exam conducted with a chaperone present  Skin:     Findings: Lesion present  Comments: Raised irregular skin lesion medial breast measuring 5 mm  Raised irregular lesion left posterior leg measuring 5 mm  Multiple small skin tags on neck on the right and 1 on the left and one skin tag on the left axilla         Shave lesion    Date/Time: 4/6/2023 4:06 PM  Performed by: Denise Ferrer DO  Authorized by: Denise Ferrer DO   Universal Protocol:  Consent: Verbal consent obtained  Written consent obtained    Risks and benefits: risks, benefits and alternatives were discussed  Consent given by: patient  Time out: Immediately prior to procedure a \"time out\" was called to verify the correct patient, procedure, equipment, " support staff and site/side marked as required  Patient understanding: patient states understanding of the procedure being performed  Patient consent: the patient's understanding of the procedure matches consent given  Procedure consent: procedure consent matches procedure scheduled  Relevant documents: relevant documents present and verified  Patient identity confirmed: verbally with patient      Number of Lesions: 2  Lesion 1:     Body area: trunk    Trunk location: R breast    Initial size (mm): 5    Final defect size (mm): 5    Malignancy: malignancy unknown      Destruction method: shave removal    Lesion 2:     Body area: lower extremity    Lower extremity location: L lower leg    Initial size (mm): 5    Final defect size (mm): 5    Malignancy: malignancy unknown      Destruction method: shave removal      Comments:  Informed consent obtained  Betadine and alcohol use  Total of 3 cc lidocaine 1% with epi used  Shave excision done for both lesions  Electrodesiccation to the bases  Specimen sent to pathology  Neosporin and dry sterile dressing applied  Specimen sent to pathology  Patient tolerated well  Strong peripheral pulses

## 2023-04-18 PROBLEM — L72.9 INFECTED CYST OF SKIN: Status: ACTIVE | Noted: 2023-04-18

## 2023-04-18 PROBLEM — L08.9 INFECTED CYST OF SKIN: Status: ACTIVE | Noted: 2023-04-18

## 2023-05-03 ENCOUNTER — HOSPITAL ENCOUNTER (EMERGENCY)
Facility: HOSPITAL | Age: 58
Discharge: HOME | End: 2023-05-03
Attending: EMERGENCY MEDICINE

## 2023-05-03 ENCOUNTER — APPOINTMENT (EMERGENCY)
Dept: RADIOLOGY | Facility: HOSPITAL | Age: 58
End: 2023-05-03

## 2023-05-03 VITALS
TEMPERATURE: 97.8 F | DIASTOLIC BLOOD PRESSURE: 82 MMHG | SYSTOLIC BLOOD PRESSURE: 148 MMHG | OXYGEN SATURATION: 96 % | HEART RATE: 83 BPM | WEIGHT: 199.96 LBS | RESPIRATION RATE: 20 BRPM | BODY MASS INDEX: 41.79 KG/M2

## 2023-05-03 DIAGNOSIS — M79.642 PAIN OF LEFT HAND: Primary | ICD-10-CM

## 2023-05-03 RX ORDER — NAPROXEN 250 MG/1
250 TABLET ORAL 2 TIMES DAILY WITH MEALS
Qty: 20 TABLET | Refills: 0 | Status: SHIPPED | OUTPATIENT
Start: 2023-05-03 | End: 2023-05-13
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
## 2023-05-04 NOTE — DISCHARGE INSTRUCTIONS
Take the Naprosyn twice daily for the next 5-10 days  Use the splint for the next few days to help with discomfort, it may be helpful to use it overnight after this  The number for hand surgery is included in these discharge instructions, call to be seen in the office for further evaluation and management

## 2023-05-10 ENCOUNTER — TELEPHONE (OUTPATIENT)
Dept: OBGYN CLINIC | Facility: CLINIC | Age: 58
End: 2023-05-10

## 2023-05-10 NOTE — TELEPHONE ENCOUNTER
Called Pt and left voicemail to schedule follow up ED visit appt with Suzy Cerrato PA-C at the [facility] location      Thank you,  Marcus Fishman

## 2023-06-02 DIAGNOSIS — B37.2 YEAST INFECTION OF THE SKIN: ICD-10-CM

## 2023-06-02 RX ORDER — NYSTATIN 100000 [USP'U]/G
POWDER TOPICAL
Qty: 30 G | Refills: 1 | Status: SHIPPED | OUTPATIENT
Start: 2023-06-02

## 2023-08-21 ENCOUNTER — TELEPHONE (OUTPATIENT)
Dept: OBGYN CLINIC | Facility: CLINIC | Age: 58
End: 2023-08-21

## 2023-08-21 ENCOUNTER — OFFICE VISIT (OUTPATIENT)
Dept: FAMILY MEDICINE CLINIC | Facility: CLINIC | Age: 58
End: 2023-08-21
Payer: COMMERCIAL

## 2023-08-21 VITALS
HEART RATE: 80 BPM | WEIGHT: 205.2 LBS | OXYGEN SATURATION: 97 % | TEMPERATURE: 97.9 F | SYSTOLIC BLOOD PRESSURE: 110 MMHG | BODY MASS INDEX: 42.89 KG/M2 | DIASTOLIC BLOOD PRESSURE: 72 MMHG

## 2023-08-21 DIAGNOSIS — L30.9 DERMATITIS: Primary | ICD-10-CM

## 2023-08-21 PROCEDURE — 99214 OFFICE O/P EST MOD 30 MIN: CPT | Performed by: FAMILY MEDICINE

## 2023-08-21 RX ORDER — MOMETASONE FUROATE 1 MG/G
CREAM TOPICAL DAILY
Qty: 30 G | Refills: 0 | Status: SHIPPED | OUTPATIENT
Start: 2023-08-21

## 2023-08-21 NOTE — PROGRESS NOTES
Assessment/Plan:      Diagnoses and all orders for this visit:    Dermatitis  -     mometasone (ELOCON) 0.1 % cream; Apply topically daily          Subjective:     Patient ID: Tung Duff is a 59 y.o. female. Patient presents with:  rash L breast area: Itching, burning. Look like bug bites. [facility]          Review of Systems   Constitutional: Negative. Respiratory: Negative. Cardiovascular: Negative. Skin: Positive for rash. As noted in HPI. Objective:     Physical Exam  Vitals and nursing note reviewed. Constitutional:       Appearance: Normal appearance. She is well-developed. HENT:      Head: Normocephalic and atraumatic. Eyes:      Pupils: Pupils are equal, round, and reactive to light. Neck:      Vascular: No JVD. Cardiovascular:      Rate and Rhythm: Normal rate. Pulmonary:      Effort: Pulmonary effort is normal.   Abdominal:      Palpations: Abdomen is soft. Musculoskeletal:      Cervical back: Normal range of motion. Lymphadenopathy:      Cervical: No cervical adenopathy. Skin:     Findings: Rash present. Comments: There are a few red raised lesions in the axillary area 2 on her left breast.  No discharge. Neurological:      Mental Status: She is alert and oriented to person, place, and time.

## 2023-08-21 NOTE — TELEPHONE ENCOUNTER
Pt reports the few bumps "bug bites" on left side near armpit are itchy, has scratched, now hurt. 2 closer to front of breast are not painful. 2 close to left nippled resemble mosquito bites that bubble up. Pt reports these bumps resemble blisters. Pt reports painful on her side, arm rubbing and irritating. Pt states area is warm, unsure of fever/chills. Pt denies any leaking or discharge from left nipple. Appt offered in office, pt unable to accommodate d/t work schedule. Pt aware refer to PCP for assessment, if unable or pcp recommends gyn evaluation to call office back.

## 2023-08-21 NOTE — TELEPHONE ENCOUNTER
Pt lmom - about one week ago found some little bumps, possibly look like bites on side of left breast into where armpit is and going into breast. Thought they were bug bits but making that side of breast very sore. About 4 days ago, found two lumps that still look like a big swollen bug bite. Don't bother, just there, close to nipple of left breast. Wondering if could get an appt today to get it checked. Breast is sore and so is the side. Works until [employer] today, would like to get an appt today after 12, she is in [address] [place] [address].
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
## 2023-12-18 ENCOUNTER — ANNUAL EXAM (OUTPATIENT)
Dept: OBGYN CLINIC | Facility: CLINIC | Age: 58
End: 2023-12-18
Payer: COMMERCIAL

## 2023-12-18 VITALS
BODY MASS INDEX: 42.82 KG/M2 | SYSTOLIC BLOOD PRESSURE: 110 MMHG | WEIGHT: 204 LBS | HEIGHT: 58 IN | DIASTOLIC BLOOD PRESSURE: 76 MMHG

## 2023-12-18 DIAGNOSIS — Z12.11 ENCOUNTER FOR SCREENING COLONOSCOPY: ICD-10-CM

## 2023-12-18 DIAGNOSIS — Z01.419 ROUTINE GYNECOLOGICAL EXAMINATION: Primary | ICD-10-CM

## 2023-12-18 DIAGNOSIS — Z12.31 ENCOUNTER FOR SCREENING MAMMOGRAM FOR MALIGNANT NEOPLASM OF BREAST: ICD-10-CM

## 2023-12-18 PROCEDURE — 99396 PREV VISIT EST AGE 40-64: CPT | Performed by: PHYSICIAN ASSISTANT

## 2023-12-18 PROCEDURE — G0476 HPV COMBO ASSAY CA SCREEN: HCPCS | Performed by: PHYSICIAN ASSISTANT

## 2023-12-18 PROCEDURE — G0145 SCR C/V CYTO,THINLAYER,RESCR: HCPCS | Performed by: PHYSICIAN ASSISTANT

## 2023-12-19 LAB
HPV HR 12 DNA CVX QL NAA+PROBE: NEGATIVE
HPV16 DNA CVX QL NAA+PROBE: NEGATIVE
HPV18 DNA CVX QL NAA+PROBE: NEGATIVE

## 2023-12-22 LAB
LAB AP GYN PRIMARY INTERPRETATION: NORMAL
Lab: NORMAL

## 2024-01-19 ENCOUNTER — TELEPHONE (OUTPATIENT)
Dept: OBGYN CLINIC | Facility: CLINIC | Age: 59
End: 2024-01-19

## 2024-02-15 ENCOUNTER — HOSPITAL ENCOUNTER (OUTPATIENT)
Dept: MAMMOGRAPHY | Facility: MEDICAL CENTER | Age: 59
Discharge: HOME | End: 2024-02-15
Payer: COMMERCIAL

## 2024-02-15 VITALS — BODY MASS INDEX: 42.82 KG/M2 | WEIGHT: 204 LBS | HEIGHT: 58 IN

## 2024-02-15 DIAGNOSIS — Z12.31 ENCOUNTER FOR SCREENING MAMMOGRAM FOR MALIGNANT NEOPLASM OF BREAST: ICD-10-CM

## 2024-02-15 PROCEDURE — 77067 SCR MAMMO BI INCL CAD: CPT

## 2024-02-15 PROCEDURE — 77063 BREAST TOMOSYNTHESIS BI: CPT

## 2024-02-21 PROBLEM — A09 DIARRHEA OF INFECTIOUS ORIGIN: Status: RESOLVED | Noted: 2020-02-07 | Resolved: 2024-02-21

## 2024-02-21 PROBLEM — Z12.11 SCREENING FOR COLON CANCER: Status: RESOLVED | Noted: 2020-04-17 | Resolved: 2024-02-21

## 2024-02-21 PROBLEM — J01.00 ACUTE NON-RECURRENT MAXILLARY SINUSITIS: Status: RESOLVED | Noted: 2018-10-30 | Resolved: 2024-02-21

## 2024-04-24 ENCOUNTER — APPOINTMENT (EMERGENCY)
Dept: RADIOLOGY | Facility: HOSPITAL | Age: 59
End: 2024-04-24
Payer: COMMERCIAL

## 2024-04-24 ENCOUNTER — HOSPITAL ENCOUNTER (EMERGENCY)
Facility: HOSPITAL | Age: 59
Discharge: HOME | End: 2024-04-24
Attending: EMERGENCY MEDICINE
Payer: COMMERCIAL

## 2024-04-24 VITALS
HEART RATE: 68 BPM | DIASTOLIC BLOOD PRESSURE: 63 MMHG | SYSTOLIC BLOOD PRESSURE: 106 MMHG | TEMPERATURE: 97.6 F | OXYGEN SATURATION: 95 % | RESPIRATION RATE: 18 BRPM

## 2024-04-24 DIAGNOSIS — R07.81 RIB PAIN ON RIGHT SIDE: Primary | ICD-10-CM

## 2024-04-24 DIAGNOSIS — S22.31XA CLOSED FRACTURE OF ONE RIB OF RIGHT SIDE, INITIAL ENCOUNTER: ICD-10-CM

## 2024-04-24 LAB
ALBUMIN SERPL BCP-MCNC: 4.2 G/DL (ref 3.5–5)
ALP SERPL-CCNC: 74 U/L (ref 34–104)
ALT SERPL W P-5'-P-CCNC: 31 U/L (ref 7–52)
ANION GAP SERPL CALCULATED.3IONS-SCNC: 9 MMOL/L (ref 4–13)
AST SERPL W P-5'-P-CCNC: 22 U/L (ref 13–39)
BILIRUB SERPL-MCNC: 0.31 MG/DL (ref 0.2–1)
BUN SERPL-MCNC: 23 MG/DL (ref 5–25)
CALCIUM SERPL-MCNC: 9.2 MG/DL (ref 8.4–10.2)
CHLORIDE SERPL-SCNC: 109 MMOL/L (ref 96–108)
CO2 SERPL-SCNC: 22 MMOL/L (ref 21–32)
CREAT SERPL-MCNC: 0.99 MG/DL (ref 0.6–1.3)
GFR SERPL CREATININE-BSD FRML MDRD: 62 ML/MIN/1.73SQ M
GLUCOSE SERPL-MCNC: 125 MG/DL (ref 65–140)
POTASSIUM SERPL-SCNC: 3.9 MMOL/L (ref 3.5–5.3)
PROT SERPL-MCNC: 7.4 G/DL (ref 6.4–8.4)
SODIUM SERPL-SCNC: 140 MMOL/L (ref 135–147)

## 2024-04-24 PROCEDURE — 80053 COMPREHEN METABOLIC PANEL: CPT | Performed by: EMERGENCY MEDICINE

## 2024-04-24 PROCEDURE — 36415 COLL VENOUS BLD VENIPUNCTURE: CPT | Performed by: EMERGENCY MEDICINE

## 2024-04-24 PROCEDURE — 96372 THER/PROPH/DIAG INJ SC/IM: CPT

## 2024-04-24 PROCEDURE — 71101 X-RAY EXAM UNILAT RIBS/CHEST: CPT

## 2024-04-24 PROCEDURE — 99284 EMERGENCY DEPT VISIT MOD MDM: CPT | Performed by: EMERGENCY MEDICINE

## 2024-04-24 PROCEDURE — 99283 EMERGENCY DEPT VISIT LOW MDM: CPT

## 2024-04-24 RX ORDER — ACETAMINOPHEN 325 MG/1
650 TABLET ORAL ONCE
Status: COMPLETED | OUTPATIENT
Start: 2024-04-24 | End: 2024-04-24

## 2024-04-24 RX ORDER — BACLOFEN 10 MG/1
10 TABLET ORAL 2 TIMES DAILY
Qty: 8 TABLET | Refills: 0 | Status: SHIPPED | OUTPATIENT
Start: 2024-04-24 | End: 2024-04-28

## 2024-04-24 RX ORDER — BACLOFEN 10 MG/1
10 TABLET ORAL ONCE
Status: COMPLETED | OUTPATIENT
Start: 2024-04-24 | End: 2024-04-24

## 2024-04-24 RX ORDER — KETOROLAC TROMETHAMINE 30 MG/ML
15 INJECTION, SOLUTION INTRAMUSCULAR; INTRAVENOUS ONCE
Status: COMPLETED | OUTPATIENT
Start: 2024-04-24 | End: 2024-04-24

## 2024-04-24 RX ORDER — TRAMADOL HYDROCHLORIDE 50 MG/1
50 TABLET ORAL EVERY 6 HOURS PRN
Qty: 16 TABLET | Refills: 0 | Status: SHIPPED | OUTPATIENT
Start: 2024-04-24 | End: 2024-04-26

## 2024-04-24 RX ORDER — LIDOCAINE 50 MG/G
1 PATCH TOPICAL ONCE
Status: DISCONTINUED | OUTPATIENT
Start: 2024-04-24 | End: 2024-04-24 | Stop reason: HOSPADM

## 2024-04-24 RX ORDER — TRAMADOL HYDROCHLORIDE 50 MG/1
50 TABLET ORAL ONCE
Status: COMPLETED | OUTPATIENT
Start: 2024-04-24 | End: 2024-04-24

## 2024-04-24 RX ORDER — LIDOCAINE 50 MG/G
1 PATCH TOPICAL DAILY
Qty: 4 PATCH | Refills: 0 | Status: SHIPPED | OUTPATIENT
Start: 2024-04-24 | End: 2024-04-26 | Stop reason: SDUPTHER

## 2024-04-24 RX ADMIN — LIDOCAINE 5% 1 PATCH: 700 PATCH TOPICAL at 14:25

## 2024-04-24 RX ADMIN — ACETAMINOPHEN 325MG 650 MG: 325 TABLET ORAL at 14:25

## 2024-04-24 RX ADMIN — BACLOFEN 10 MG: 10 TABLET ORAL at 14:25

## 2024-04-24 RX ADMIN — TRAMADOL HYDROCHLORIDE 50 MG: 50 TABLET, COATED ORAL at 16:01

## 2024-04-24 RX ADMIN — KETOROLAC TROMETHAMINE 15 MG: 30 INJECTION, SOLUTION INTRAMUSCULAR; INTRAVENOUS at 15:10
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
## 2024-04-24 NOTE — Clinical Note
Elvira Dejesus was seen and treated in our emergency department on 4/24/2024.                Diagnosis:     Elvira  .    She may return on this date: 04/29/2024         If you have any questions or concerns, please don't hesitate to call.      Farhana Mejia, DO    ______________________________           _______________          _______________  Hospital Representative                              Date                                Time

## 2024-04-24 NOTE — DISCHARGE INSTRUCTIONS
As discussed, you have a fracture of your ninth rib.  Please do not lift anything greater than 10 pounds    While taking pain medication please be sure to start taking stool softeners to help prevent constipation

## 2024-04-26 ENCOUNTER — OFFICE VISIT (OUTPATIENT)
Dept: FAMILY MEDICINE CLINIC | Facility: CLINIC | Age: 59
End: 2024-04-26
Payer: COMMERCIAL

## 2024-04-26 VITALS
WEIGHT: 206 LBS | TEMPERATURE: 97.2 F | SYSTOLIC BLOOD PRESSURE: 134 MMHG | OXYGEN SATURATION: 98 % | HEIGHT: 58 IN | HEART RATE: 77 BPM | DIASTOLIC BLOOD PRESSURE: 64 MMHG | BODY MASS INDEX: 43.24 KG/M2

## 2024-04-26 DIAGNOSIS — Z78.0 POSTMENOPAUSAL: ICD-10-CM

## 2024-04-26 DIAGNOSIS — S22.31XA CLOSED FRACTURE OF ONE RIB OF RIGHT SIDE, INITIAL ENCOUNTER: Primary | ICD-10-CM

## 2024-04-26 PROCEDURE — 99213 OFFICE O/P EST LOW 20 MIN: CPT | Performed by: FAMILY MEDICINE

## 2024-04-26 RX ORDER — LIDOCAINE 50 MG/G
1 PATCH TOPICAL DAILY
Qty: 30 PATCH | Refills: 0 | Status: SHIPPED | OUTPATIENT
Start: 2024-04-26 | End: 2024-04-30

## 2024-04-26 RX ORDER — GABAPENTIN 100 MG/1
100 CAPSULE ORAL 3 TIMES DAILY
Qty: 90 CAPSULE | Refills: 1 | Status: SHIPPED | OUTPATIENT
Start: 2024-04-26

## 2024-04-26 NOTE — LETTER
April 26, 2024     Patient: Elvira Dejesus  YOB: 1965  Date of Visit: 4/26/2024      To Whom it May Concern:    Elvira Dejesus is under my professional care. Elvira was seen in my office on 4/26/2024. Elvira may return to work on 4/29/2024 but patient is request to be on light duty (I.e. no heavy-lifting, pushing or pulling or twisting) for the next month due to rib fracture .    If you have any questions or concerns, please don't hesitate to call.         Sincerely,          Ruddy Lozano MD        CC: No Recipients

## 2024-04-30 NOTE — PROGRESS NOTES
Assessment/Plan:    57 y/o woman with: rib fracture. Discussed workup and treatment options. Will continue lidocaine patch and add gabapentin will check follow-up Xray in 4-6 weeks and will check DEXA scan to rule out pathologic fracture. Discussed supportive care and return parameters.     No problem-specific Assessment & Plan notes found for this encounter.       Diagnoses and all orders for this visit:    Closed fracture of one rib of right side, initial encounter  -     lidocaine (LIDODERM) 5 %; Apply 1 patch topically over 12 hours daily for 4 days Remove & Discard patch within 12 hours or as directed by MD  -     XR ribs right w pa chest min 3 views; Future  -     gabapentin (NEURONTIN) 100 mg capsule; Take 1 capsule (100 mg total) by mouth 3 (three) times a day    Postmenopausal  -     DXA bone density spine hip and pelvis; Future    Other orders  -     Cyanocobalamin 1000 MCG/ML KIT  -     Ferrous Sulfate (IRON PO);  (Patient not taking: Reported on 4/26/2024)          Subjective:     Chief Complaint   Patient presents with    Follow-up     Pt is here for a ER follow up she was told she had a broken 9th rib no other problems or concerns helen         Patient ID: Elvira Dejesus is a 58 y.o. female.    Patient is a 57 y/o woman who presents for follow-up rib fracture pt denies precipitating trauma no fevers chills nausea or vomiting no SOB.        The following portions of the patient's history were reviewed and updated as appropriate: allergies, current medications, past family history, past medical history, past social history, past surgical history and problem list.    Review of Systems   Constitutional: Negative.    HENT: Negative.     Eyes: Negative.    Respiratory: Negative.     Cardiovascular: Negative.    Gastrointestinal: Negative.    Endocrine: Negative.    Genitourinary: Negative.    Musculoskeletal:  Positive for arthralgias.   Allergic/Immunologic: Negative.    Neurological: Negative.    Hematological:  "Negative.    Psychiatric/Behavioral: Negative.     All other systems reviewed and are negative.        Objective:      /64 (BP Location: Right arm, Patient Position: Sitting, Cuff Size: Standard)   Pulse 77   Temp (!) 97.2 °F (36.2 °C) (Temporal)   Ht 4' 10\" (1.473 m)   Wt 93.4 kg (206 lb)   LMP 08/01/2000 (Approximate)   SpO2 98%   BMI 43.05 kg/m²          Physical Exam  Constitutional:       Appearance: She is well-developed.   HENT:      Head: Atraumatic.      Right Ear: External ear normal.      Left Ear: External ear normal.   Eyes:      Conjunctiva/sclera: Conjunctivae normal.      Pupils: Pupils are equal, round, and reactive to light.   Cardiovascular:      Rate and Rhythm: Normal rate and regular rhythm.      Heart sounds: Normal heart sounds.   Pulmonary:      Effort: Pulmonary effort is normal. No respiratory distress.      Breath sounds: Normal breath sounds.   Abdominal:      General: There is no distension.      Palpations: Abdomen is soft.      Tenderness: There is no abdominal tenderness. There is no guarding or rebound.   Musculoskeletal:         General: Tenderness present. Normal range of motion.      Cervical back: Normal range of motion.   Skin:     General: Skin is warm and dry.   Neurological:      Mental Status: She is alert and oriented to person, place, and time.      Cranial Nerves: No cranial nerve deficit.   Psychiatric:         Behavior: Behavior normal.         Thought Content: Thought content normal.         Judgment: Judgment normal.         "

## 2024-05-07 ENCOUNTER — HOSPITAL ENCOUNTER (OUTPATIENT)
Dept: BONE DENSITY | Facility: MEDICAL CENTER | Age: 59
Discharge: HOME | End: 2024-05-07
Payer: COMMERCIAL

## 2024-05-07 ENCOUNTER — APPOINTMENT (OUTPATIENT)
Dept: LAB | Facility: HOSPITAL | Age: 59
End: 2024-05-07
Payer: COMMERCIAL

## 2024-05-07 DIAGNOSIS — G40.909 SEIZURE DISORDER (HCC): ICD-10-CM

## 2024-05-07 DIAGNOSIS — Z78.0 POSTMENOPAUSAL: ICD-10-CM

## 2024-05-07 LAB
ALBUMIN SERPL BCP-MCNC: 4.3 G/DL (ref 3.5–5)
ALP SERPL-CCNC: 85 U/L (ref 34–104)
ALT SERPL W P-5'-P-CCNC: 29 U/L (ref 7–52)
ANION GAP SERPL CALCULATED.3IONS-SCNC: 4 MMOL/L (ref 4–13)
AST SERPL W P-5'-P-CCNC: 16 U/L (ref 13–39)
BASOPHILS # BLD AUTO: 0.06 THOUSANDS/ÂΜL (ref 0–0.1)
BASOPHILS NFR BLD AUTO: 1 % (ref 0–1)
BILIRUB SERPL-MCNC: 0.26 MG/DL (ref 0.2–1)
BUN SERPL-MCNC: 19 MG/DL (ref 5–25)
CALCIUM SERPL-MCNC: 9.5 MG/DL (ref 8.4–10.2)
CHLORIDE SERPL-SCNC: 107 MMOL/L (ref 96–108)
CO2 SERPL-SCNC: 28 MMOL/L (ref 21–32)
CREAT SERPL-MCNC: 1.04 MG/DL (ref 0.6–1.3)
EOSINOPHIL # BLD AUTO: 0.19 THOUSAND/ÂΜL (ref 0–0.61)
EOSINOPHIL NFR BLD AUTO: 2 % (ref 0–6)
ERYTHROCYTE [DISTWIDTH] IN BLOOD BY AUTOMATED COUNT: 12.9 % (ref 11.6–15.1)
GFR SERPL CREATININE-BSD FRML MDRD: 58 ML/MIN/1.73SQ M
GLUCOSE SERPL-MCNC: 168 MG/DL (ref 65–140)
HCT VFR BLD AUTO: 43.3 % (ref 34.8–46.1)
HGB BLD-MCNC: 14.2 G/DL (ref 11.5–15.4)
IMM GRANULOCYTES # BLD AUTO: 0.03 THOUSAND/UL (ref 0–0.2)
IMM GRANULOCYTES NFR BLD AUTO: 0 % (ref 0–2)
LYMPHOCYTES # BLD AUTO: 1.88 THOUSANDS/ÂΜL (ref 0.6–4.47)
LYMPHOCYTES NFR BLD AUTO: 24 % (ref 14–44)
MCH RBC QN AUTO: 29.7 PG (ref 26.8–34.3)
MCHC RBC AUTO-ENTMCNC: 32.8 G/DL (ref 31.4–37.4)
MCV RBC AUTO: 91 FL (ref 82–98)
MONOCYTES # BLD AUTO: 0.59 THOUSAND/ÂΜL (ref 0.17–1.22)
MONOCYTES NFR BLD AUTO: 8 % (ref 4–12)
NEUTROPHILS # BLD AUTO: 5.09 THOUSANDS/ÂΜL (ref 1.85–7.62)
NEUTS SEG NFR BLD AUTO: 65 % (ref 43–75)
NRBC BLD AUTO-RTO: 0 /100 WBCS
PLATELET # BLD AUTO: 241 THOUSANDS/UL (ref 149–390)
PMV BLD AUTO: 9.6 FL (ref 8.9–12.7)
POTASSIUM SERPL-SCNC: 4.1 MMOL/L (ref 3.5–5.3)
PROT SERPL-MCNC: 7.5 G/DL (ref 6.4–8.4)
RBC # BLD AUTO: 4.78 MILLION/UL (ref 3.81–5.12)
SODIUM SERPL-SCNC: 139 MMOL/L (ref 135–147)
WBC # BLD AUTO: 7.84 THOUSAND/UL (ref 4.31–10.16)

## 2024-05-07 PROCEDURE — 36415 COLL VENOUS BLD VENIPUNCTURE: CPT

## 2024-05-07 PROCEDURE — 80053 COMPREHEN METABOLIC PANEL: CPT

## 2024-05-07 PROCEDURE — 77080 DXA BONE DENSITY AXIAL: CPT

## 2024-05-07 PROCEDURE — 80201 ASSAY OF TOPIRAMATE: CPT

## 2024-05-07 PROCEDURE — 85025 COMPLETE CBC W/AUTO DIFF WBC: CPT

## 2024-05-09 LAB — TOPIRAMATE SERPL-MCNC: 11.4 UG/ML (ref 2–25)

## 2024-05-28 ENCOUNTER — HOSPITAL ENCOUNTER (OUTPATIENT)
Dept: RADIOLOGY | Facility: HOSPITAL | Age: 59
Discharge: HOME | End: 2024-05-28
Payer: COMMERCIAL

## 2024-05-28 DIAGNOSIS — S22.31XA CLOSED FRACTURE OF ONE RIB OF RIGHT SIDE, INITIAL ENCOUNTER: ICD-10-CM

## 2024-05-28 PROCEDURE — 71101 X-RAY EXAM UNILAT RIBS/CHEST: CPT

## 2024-05-29 ENCOUNTER — TELEPHONE (OUTPATIENT)
Age: 59
End: 2024-05-29

## 2024-05-29 DIAGNOSIS — S22.31XA CLOSED FRACTURE OF ONE RIB OF RIGHT SIDE, INITIAL ENCOUNTER: Primary | ICD-10-CM

## 2024-05-29 NOTE — RESULT ENCOUNTER NOTE
Please call patient. Xray shows minimally displaced fx with little callus formation. Recommend recheck xray in 2-3 weeks, if not improving, recommend referral to ortho.

## 2024-05-29 NOTE — TELEPHONE ENCOUNTER
Patient has been made aware of X-R results and recommendations.    Patient requesting orders be placed in chart .    Please review  Thank you

## 2024-05-29 NOTE — TELEPHONE ENCOUNTER
Patient returned call regarding x-ray results.  Warm transfer to [facility] successful.

## 2024-06-22 DIAGNOSIS — S22.31XA CLOSED FRACTURE OF ONE RIB OF RIGHT SIDE, INITIAL ENCOUNTER: ICD-10-CM

## 2024-06-22 RX ORDER — GABAPENTIN 100 MG/1
100 CAPSULE ORAL 3 TIMES DAILY
Qty: 90 CAPSULE | Refills: 1 | Status: SHIPPED | OUTPATIENT
Start: 2024-06-22

## 2024-07-09 ENCOUNTER — HOSPITAL ENCOUNTER (OUTPATIENT)
Dept: RADIOLOGY | Facility: HOSPITAL | Age: 59
Discharge: HOME | End: 2024-07-09
Payer: COMMERCIAL

## 2024-07-09 DIAGNOSIS — S22.31XA CLOSED FRACTURE OF ONE RIB OF RIGHT SIDE, INITIAL ENCOUNTER: ICD-10-CM

## 2024-07-09 PROCEDURE — 71101 X-RAY EXAM UNILAT RIBS/CHEST: CPT

## 2024-07-10 ENCOUNTER — TELEPHONE (OUTPATIENT)
Dept: FAMILY MEDICINE CLINIC | Facility: CLINIC | Age: 59
End: 2024-07-10

## 2024-07-10 DIAGNOSIS — S22.31XA CLOSED FRACTURE OF ONE RIB OF RIGHT SIDE, INITIAL ENCOUNTER: Primary | ICD-10-CM

## 2024-07-10 NOTE — RESULT ENCOUNTER NOTE
Left message for patient to call back to advise of referral for pain management. Message also sent via [place]

## 2024-07-10 NOTE — TELEPHONE ENCOUNTER
----- Message from Scott Choi DO sent at 7/10/2024  1:56 PM EDT -----  Referral to pain management  ----- Message -----  From: Chelsy Quach  Sent: 7/10/2024   1:47 PM EDT  To: Scott Choi, DO    I did speak to patient regarding her CXR results.  She is still complaining of significant pain and wants to know what you recommend next?

## 2024-08-02 ENCOUNTER — CONSULT (OUTPATIENT)
Dept: PAIN MEDICINE | Facility: CLINIC | Age: 59
End: 2024-08-02
Payer: COMMERCIAL

## 2024-08-02 VITALS — HEIGHT: 58 IN | BODY MASS INDEX: 43.24 KG/M2 | WEIGHT: 206 LBS

## 2024-08-02 DIAGNOSIS — G58.8 INTERCOSTAL NEURALGIA: Primary | ICD-10-CM

## 2024-08-02 DIAGNOSIS — S22.31XS CLOSED FRACTURE OF ONE RIB OF RIGHT SIDE, SEQUELA: ICD-10-CM

## 2024-08-02 PROCEDURE — 99244 OFF/OP CNSLTJ NEW/EST MOD 40: CPT | Performed by: ANESTHESIOLOGY
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
## 2024-08-16 ENCOUNTER — VBI (OUTPATIENT)
Dept: ADMINISTRATIVE | Facility: OTHER | Age: 59
End: 2024-08-16

## 2024-08-16 NOTE — TELEPHONE ENCOUNTER
08/16/24 6:45 AM     Chart reviewed for CRC: Colonoscopy ; nothing is submitted to the patient's insurance at this time.     Amanda Zuñiga   [facility]

## 2025-01-02 ENCOUNTER — OFFICE VISIT (OUTPATIENT)
Age: 60
End: 2025-01-02
Payer: COMMERCIAL

## 2025-01-02 VITALS
HEART RATE: 107 BPM | WEIGHT: 195.6 LBS | OXYGEN SATURATION: 98 % | DIASTOLIC BLOOD PRESSURE: 70 MMHG | SYSTOLIC BLOOD PRESSURE: 136 MMHG | HEIGHT: 58 IN | RESPIRATION RATE: 14 BRPM | TEMPERATURE: 97.8 F | BODY MASS INDEX: 41.06 KG/M2

## 2025-01-02 DIAGNOSIS — L60.8 TOENAIL DEFORMITY: ICD-10-CM

## 2025-01-02 DIAGNOSIS — J06.9 ACUTE UPPER RESPIRATORY INFECTION: Primary | ICD-10-CM

## 2025-01-02 DIAGNOSIS — L30.9 HAND DERMATITIS: ICD-10-CM

## 2025-01-02 PROCEDURE — 99214 OFFICE O/P EST MOD 30 MIN: CPT | Performed by: PHYSICIAN ASSISTANT

## 2025-01-02 NOTE — PROGRESS NOTES
Name: Elvira Dejesus      : 1965      MRN: [id number]  Encounter Provider: Kristie Chatterjee PA-C  Encounter Date: 2025   Encounter department: [facility]  :  Assessment & Plan  Acute upper respiratory infection  -Over-the-counter generic Sudafed 30 mg 2 tablets every 6 hours as needed for congestion and postnasal drip  - Increase clear liquids  - Advised that the medication can cause palpitations and difficulty falling asleep at night.  Discontinue the medication if having intolerable side effects  - Recommend follow-up if there is no improvement over the next week, go to the ER if symptoms increase and we are not available  -I did encourage her to make an appointment with Dr. Choi for her annual physical.       Toenail deformity  -Refer to podiatry for thickened toenails on her left foot  Orders:    Ambulatory Referral to Podiatry; Future    Hand dermatitis  -Refer to dermatology for lesions on hands  Orders:    Ambulatory Referral to Dermatology; Future    M*Modal software was used to dictate this note. It may contain errors with dictating incorrect words/spelling. Please contact provider directly for any questions.          History of Present Illness     Patient presents today for an acute visit for upper respiratory symptoms that started yesterday.  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  She denies any fever, chills, shortness of breath, ear pain.  She does have a scratchy throat.  She does notice a postnasal drip.  No current treatment.    She states while she is here she is wondering if there is any recommendations for the toenail fungus of her 5 toes on her left foot.  Not on her right foot.  She has never seen podiatry.  She also has lesions on her fingers and was told that there are possible warts.  She has never seen dermatology.  She would like a referral to Derm.      Review of Systems   Constitutional:  Negative for  "chills and fever.   HENT:  Positive for congestion, postnasal drip, rhinorrhea and sore throat. Negative for ear discharge and ear pain.    Respiratory:  Positive for cough. Negative for shortness of breath.    Skin:         As stated in HPI       Objective   /70 (BP Location: Left arm, Patient Position: Sitting, Cuff Size: Standard)   Pulse (!) 107   Temp 97.8 °F (36.6 °C) (Temporal)   Resp 14   Ht 4' 10\" (1.473 m)   Wt 88.7 kg (195 lb 9.6 oz)   LMP 08/01/2000 (Approximate)   SpO2 98%   BMI 40.88 kg/m²      Physical Exam  Vitals reviewed.   Constitutional:       General: She is not in acute distress.     Appearance: She is not ill-appearing or toxic-appearing.   Cardiovascular:      Rate and Rhythm: Normal rate and regular rhythm.      Heart sounds: Normal heart sounds. No murmur heard.  Pulmonary:      Effort: Pulmonary effort is normal. No respiratory distress.      Breath sounds: Normal breath sounds. No wheezing, rhonchi or rales.   Musculoskeletal:      Cervical back: Neck supple.   Skin:     Comments: Left foot: She does have thickened yellow-brownish nails on all 5 toes    Hands: She does have multiple papular lesions   Neurological:      General: No focal deficit present.      Mental Status: She is alert.   Psychiatric:         Mood and Affect: Mood normal.         Behavior: Behavior normal.         Thought Content: Thought content normal.         Judgment: Judgment normal.         "

## 2025-01-02 NOTE — LETTER
January 2, 2025     Patient: Elvira Dejesus  YOB: 1965  Date of Visit: 1/2/2025      To Whom it May Concern:    Elvira Dejesus is under my professional care. Elvira was seen in my office on 1/2/2025. Elvira may return to work on 1/6/25 .    If you have any questions or concerns, please don't hesitate to call.         Sincerely,          Kristie Chatterjee PA-C        CC: No Recipients

## 2025-01-05 ENCOUNTER — HOSPITAL ENCOUNTER (INPATIENT)
Facility: HOSPITAL | Age: 60
LOS: 1 days | Discharge: HOME | DRG: 113 | End: 2025-01-07
Attending: EMERGENCY MEDICINE | Admitting: HOSPITALIST
Payer: COMMERCIAL

## 2025-01-05 ENCOUNTER — APPOINTMENT (EMERGENCY)
Dept: RADIOLOGY | Facility: HOSPITAL | Age: 60
DRG: 113 | End: 2025-01-05
Payer: COMMERCIAL

## 2025-01-05 DIAGNOSIS — J96.01 ACUTE HYPOXIC RESPIRATORY FAILURE (HCC): Primary | ICD-10-CM

## 2025-01-05 DIAGNOSIS — J10.1 INFLUENZA A: ICD-10-CM

## 2025-01-05 LAB
ALBUMIN SERPL BCG-MCNC: 4.2 G/DL (ref 3.5–5)
ALP SERPL-CCNC: 81 U/L (ref 34–104)
ALT SERPL W P-5'-P-CCNC: 36 U/L (ref 7–52)
ANION GAP SERPL CALCULATED.3IONS-SCNC: 8 MMOL/L (ref 4–13)
APTT PPP: 24 SECONDS (ref 23–34)
AST SERPL W P-5'-P-CCNC: 28 U/L (ref 13–39)
BASOPHILS # BLD AUTO: 0.02 THOUSANDS/ΜL (ref 0–0.1)
BASOPHILS NFR BLD AUTO: 0 % (ref 0–1)
BILIRUB SERPL-MCNC: 0.28 MG/DL (ref 0.2–1)
BUN SERPL-MCNC: 18 MG/DL (ref 5–25)
CALCIUM SERPL-MCNC: 8.5 MG/DL (ref 8.4–10.2)
CHLORIDE SERPL-SCNC: 104 MMOL/L (ref 96–108)
CO2 SERPL-SCNC: 23 MMOL/L (ref 21–32)
CREAT SERPL-MCNC: 1.09 MG/DL (ref 0.6–1.3)
EOSINOPHIL # BLD AUTO: 0.05 THOUSAND/ΜL (ref 0–0.61)
EOSINOPHIL NFR BLD AUTO: 1 % (ref 0–6)
ERYTHROCYTE [DISTWIDTH] IN BLOOD BY AUTOMATED COUNT: 12.8 % (ref 11.6–15.1)
FLUAV AG UPPER RESP QL IA.RAPID: POSITIVE
FLUBV AG UPPER RESP QL IA.RAPID: NEGATIVE
GFR SERPL CREATININE-BSD FRML MDRD: 54 ML/MIN/1.73SQ M
GLUCOSE SERPL-MCNC: 92 MG/DL (ref 65–140)
HCT VFR BLD AUTO: 44.6 % (ref 34.8–46.1)
HGB BLD-MCNC: 14.5 G/DL (ref 11.5–15.4)
IMM GRANULOCYTES # BLD AUTO: 0.03 THOUSAND/UL (ref 0–0.2)
IMM GRANULOCYTES NFR BLD AUTO: 1 % (ref 0–2)
INR PPP: 1.06 (ref 0.85–1.19)
LACTATE SERPL-SCNC: 0.9 MMOL/L (ref 0.5–2)
LYMPHOCYTES # BLD AUTO: 1.67 THOUSANDS/ΜL (ref 0.6–4.47)
LYMPHOCYTES NFR BLD AUTO: 32 % (ref 14–44)
MCH RBC QN AUTO: 28.5 PG (ref 26.8–34.3)
MCHC RBC AUTO-ENTMCNC: 32.5 G/DL (ref 31.4–37.4)
MCV RBC AUTO: 88 FL (ref 82–98)
MONOCYTES # BLD AUTO: 0.52 THOUSAND/ΜL (ref 0.17–1.22)
MONOCYTES NFR BLD AUTO: 10 % (ref 4–12)
NEUTROPHILS # BLD AUTO: 3 THOUSANDS/ΜL (ref 1.85–7.62)
NEUTS SEG NFR BLD AUTO: 56 % (ref 43–75)
NRBC BLD AUTO-RTO: 0 /100 WBCS
PLATELET # BLD AUTO: 227 THOUSANDS/UL (ref 149–390)
PMV BLD AUTO: 9.3 FL (ref 8.9–12.7)
POTASSIUM SERPL-SCNC: 3.6 MMOL/L (ref 3.5–5.3)
PROCALCITONIN SERPL-MCNC: 0.15 NG/ML
PROT SERPL-MCNC: 7.7 G/DL (ref 6.4–8.4)
PROTHROMBIN TIME: 14 SECONDS (ref 12.3–15)
RBC # BLD AUTO: 5.08 MILLION/UL (ref 3.81–5.12)
SARS-COV+SARS-COV-2 AG RESP QL IA.RAPID: NEGATIVE
SODIUM SERPL-SCNC: 135 MMOL/L (ref 135–147)
WBC # BLD AUTO: 5.29 THOUSAND/UL (ref 4.31–10.16)

## 2025-01-05 PROCEDURE — 87804 INFLUENZA ASSAY W/OPTIC: CPT | Performed by: EMERGENCY MEDICINE

## 2025-01-05 PROCEDURE — 85730 THROMBOPLASTIN TIME PARTIAL: CPT | Performed by: EMERGENCY MEDICINE

## 2025-01-05 PROCEDURE — 71046 X-RAY EXAM CHEST 2 VIEWS: CPT

## 2025-01-05 PROCEDURE — 84145 PROCALCITONIN (PCT): CPT | Performed by: EMERGENCY MEDICINE

## 2025-01-05 PROCEDURE — 85610 PROTHROMBIN TIME: CPT | Performed by: EMERGENCY MEDICINE

## 2025-01-05 PROCEDURE — 85025 COMPLETE CBC W/AUTO DIFF WBC: CPT | Performed by: EMERGENCY MEDICINE

## 2025-01-05 PROCEDURE — 99285 EMERGENCY DEPT VISIT HI MDM: CPT | Performed by: EMERGENCY MEDICINE

## 2025-01-05 PROCEDURE — 80053 COMPREHEN METABOLIC PANEL: CPT | Performed by: EMERGENCY MEDICINE

## 2025-01-05 PROCEDURE — 87040 BLOOD CULTURE FOR BACTERIA: CPT | Performed by: EMERGENCY MEDICINE

## 2025-01-05 PROCEDURE — 96361 HYDRATE IV INFUSION ADD-ON: CPT

## 2025-01-05 PROCEDURE — 99285 EMERGENCY DEPT VISIT HI MDM: CPT

## 2025-01-05 PROCEDURE — 96360 HYDRATION IV INFUSION INIT: CPT

## 2025-01-05 PROCEDURE — 83605 ASSAY OF LACTIC ACID: CPT | Performed by: EMERGENCY MEDICINE

## 2025-01-05 PROCEDURE — 87811 SARS-COV-2 COVID19 W/OPTIC: CPT | Performed by: EMERGENCY MEDICINE

## 2025-01-05 PROCEDURE — 36415 COLL VENOUS BLD VENIPUNCTURE: CPT | Performed by: EMERGENCY MEDICINE

## 2025-01-05 PROCEDURE — 99223 1ST HOSP IP/OBS HIGH 75: CPT | Performed by: INTERNAL MEDICINE

## 2025-01-05 RX ORDER — ENOXAPARIN SODIUM 100 MG/ML
40 INJECTION SUBCUTANEOUS EVERY 12 HOURS SCHEDULED
Status: CANCELLED | OUTPATIENT
Start: 2025-01-05

## 2025-01-05 RX ORDER — ACETAMINOPHEN 325 MG/1
650 TABLET ORAL ONCE
Status: COMPLETED | OUTPATIENT
Start: 2025-01-05 | End: 2025-01-05

## 2025-01-05 RX ORDER — GUAIFENESIN 600 MG/1
600 TABLET, EXTENDED RELEASE ORAL EVERY 12 HOURS SCHEDULED
Status: DISCONTINUED | OUTPATIENT
Start: 2025-01-05 | End: 2025-01-07 | Stop reason: HOSPADM

## 2025-01-05 RX ORDER — ONDANSETRON 2 MG/ML
4 INJECTION INTRAMUSCULAR; INTRAVENOUS EVERY 6 HOURS PRN
Status: DISCONTINUED | OUTPATIENT
Start: 2025-01-05 | End: 2025-01-07 | Stop reason: HOSPADM

## 2025-01-05 RX ORDER — BENZONATATE 100 MG/1
100 CAPSULE ORAL 3 TIMES DAILY PRN
Status: DISCONTINUED | OUTPATIENT
Start: 2025-01-05 | End: 2025-01-07

## 2025-01-05 RX ORDER — OSELTAMIVIR PHOSPHATE 30 MG/1
30 CAPSULE ORAL EVERY 12 HOURS SCHEDULED
Status: CANCELLED | OUTPATIENT
Start: 2025-01-05 | End: 2025-01-10

## 2025-01-05 RX ORDER — ENOXAPARIN SODIUM 100 MG/ML
40 INJECTION SUBCUTANEOUS DAILY
Status: DISCONTINUED | OUTPATIENT
Start: 2025-01-06 | End: 2025-01-07 | Stop reason: HOSPADM

## 2025-01-05 RX ORDER — TOPIRAMATE 100 MG/1
100 TABLET, FILM COATED ORAL 2 TIMES DAILY
Status: DISCONTINUED | OUTPATIENT
Start: 2025-01-05 | End: 2025-01-05 | Stop reason: SDUPTHER

## 2025-01-05 RX ORDER — ACETAMINOPHEN 325 MG/1
650 TABLET ORAL EVERY 6 HOURS PRN
Status: DISCONTINUED | OUTPATIENT
Start: 2025-01-05 | End: 2025-01-07 | Stop reason: HOSPADM

## 2025-01-05 RX ORDER — SODIUM CHLORIDE 9 MG/ML
100 INJECTION, SOLUTION INTRAVENOUS CONTINUOUS
Status: DISCONTINUED | OUTPATIENT
Start: 2025-01-05 | End: 2025-01-07

## 2025-01-05 RX ORDER — OSELTAMIVIR PHOSPHATE 30 MG/1
30 CAPSULE ORAL EVERY 12 HOURS SCHEDULED
Status: DISCONTINUED | OUTPATIENT
Start: 2025-01-05 | End: 2025-01-07

## 2025-01-05 RX ADMIN — SODIUM CHLORIDE 100 ML/HR: 0.9 INJECTION, SOLUTION INTRAVENOUS at 21:26

## 2025-01-05 RX ADMIN — ONDANSETRON 4 MG: 2 INJECTION INTRAMUSCULAR; INTRAVENOUS at 21:26

## 2025-01-05 RX ADMIN — GUAIFENESIN 600 MG: 600 TABLET, EXTENDED RELEASE ORAL at 21:22

## 2025-01-05 RX ADMIN — SODIUM CHLORIDE 1000 ML: 0.9 INJECTION, SOLUTION INTRAVENOUS at 18:08

## 2025-01-05 RX ADMIN — OSELTAMIVIR PHOSPHATE 30 MG: 30 CAPSULE ORAL at 21:22

## 2025-01-05 RX ADMIN — ACETAMINOPHEN 650 MG: 325 TABLET, FILM COATED ORAL at 18:15

## 2025-01-05 NOTE — LETTER
[place] [address]  [address] [facility] [place] [address]  Dept: [phone]    January 7, 2025     Patient: Elvira Dejesus   YOB: 1965   Date of Visit: 1/5/2025       To Whom it May Concern:    Elvira Dejesus is under my professional care. She was seen in the hospital from 1/5/2025 to 01/07/25. She may return to work on 1/13/2025 without limitations.    If you have any questions or concerns, please don't hesitate to call.         Sincerely,          Kaitlyn Lomeli PA-C

## 2025-01-05 NOTE — LETTER
[place] [address]  [address] [facility] [place] [address]  Dept: [phone]    January 7, 2025     Patient: Elvira Dejesus   YOB: 1965   Date of Visit: 1/5/2025       To Whom it May Concern:    Elvira Dejesus is under my professional care. She was seen in the hospital from 1/5/2025 to 01/07/25. She may return to work on 1/13/2025 without limitations.    If you have any questions or concerns, please don't hesitate to call.         Sincerely,          Kaitlyn Lomeli PA-C
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
## 2025-01-06 PROBLEM — E87.6 HYPOKALEMIA: Status: ACTIVE | Noted: 2025-01-06

## 2025-01-06 PROBLEM — R19.7 DIARRHEA: Status: ACTIVE | Noted: 2025-01-06

## 2025-01-06 LAB
ANION GAP SERPL CALCULATED.3IONS-SCNC: 5 MMOL/L (ref 4–13)
BUN SERPL-MCNC: 14 MG/DL (ref 5–25)
CALCIUM SERPL-MCNC: 7.6 MG/DL (ref 8.4–10.2)
CHLORIDE SERPL-SCNC: 106 MMOL/L (ref 96–108)
CO2 SERPL-SCNC: 26 MMOL/L (ref 21–32)
CREAT SERPL-MCNC: 0.96 MG/DL (ref 0.6–1.3)
ERYTHROCYTE [DISTWIDTH] IN BLOOD BY AUTOMATED COUNT: 13 % (ref 11.6–15.1)
GFR SERPL CREATININE-BSD FRML MDRD: 64 ML/MIN/1.73SQ M
GLUCOSE SERPL-MCNC: 143 MG/DL (ref 65–140)
HCT VFR BLD AUTO: 40 % (ref 34.8–46.1)
HGB BLD-MCNC: 13 G/DL (ref 11.5–15.4)
MCH RBC QN AUTO: 28.7 PG (ref 26.8–34.3)
MCHC RBC AUTO-ENTMCNC: 32.5 G/DL (ref 31.4–37.4)
MCV RBC AUTO: 88 FL (ref 82–98)
PLATELET # BLD AUTO: 200 THOUSANDS/UL (ref 149–390)
PMV BLD AUTO: 9.5 FL (ref 8.9–12.7)
POTASSIUM SERPL-SCNC: 3.4 MMOL/L (ref 3.5–5.3)
RBC # BLD AUTO: 4.53 MILLION/UL (ref 3.81–5.12)
SODIUM SERPL-SCNC: 137 MMOL/L (ref 135–147)
WBC # BLD AUTO: 3.63 THOUSAND/UL (ref 4.31–10.16)

## 2025-01-06 PROCEDURE — 85027 COMPLETE CBC AUTOMATED: CPT

## 2025-01-06 PROCEDURE — 80048 BASIC METABOLIC PNL TOTAL CA: CPT

## 2025-01-06 PROCEDURE — 99232 SBSQ HOSP IP/OBS MODERATE 35: CPT | Performed by: HOSPITALIST

## 2025-01-06 RX ORDER — POTASSIUM CHLORIDE 1500 MG/1
20 TABLET, EXTENDED RELEASE ORAL ONCE
Status: COMPLETED | OUTPATIENT
Start: 2025-01-06 | End: 2025-01-06

## 2025-01-06 RX ORDER — LOPERAMIDE HYDROCHLORIDE 2 MG/1
2 CAPSULE ORAL 4 TIMES DAILY PRN
Status: DISCONTINUED | OUTPATIENT
Start: 2025-01-06 | End: 2025-01-07 | Stop reason: HOSPADM

## 2025-01-06 RX ADMIN — SODIUM CHLORIDE 100 ML/HR: 0.9 INJECTION, SOLUTION INTRAVENOUS at 07:16

## 2025-01-06 RX ADMIN — OSELTAMIVIR PHOSPHATE 30 MG: 30 CAPSULE ORAL at 08:24

## 2025-01-06 RX ADMIN — POTASSIUM CHLORIDE 20 MEQ: 1500 TABLET, EXTENDED RELEASE ORAL at 17:26

## 2025-01-06 RX ADMIN — BENZONATATE 100 MG: 100 CAPSULE ORAL at 21:54

## 2025-01-06 RX ADMIN — ONDANSETRON 4 MG: 2 INJECTION INTRAMUSCULAR; INTRAVENOUS at 21:54

## 2025-01-06 RX ADMIN — SODIUM CHLORIDE 100 ML/HR: 0.9 INJECTION, SOLUTION INTRAVENOUS at 17:26

## 2025-01-06 RX ADMIN — TOPIRAMATE: 100 TABLET, FILM COATED ORAL at 21:55

## 2025-01-06 RX ADMIN — OSELTAMIVIR PHOSPHATE 30 MG: 30 CAPSULE ORAL at 21:54

## 2025-01-06 RX ADMIN — GUAIFENESIN 600 MG: 600 TABLET, EXTENDED RELEASE ORAL at 21:54

## 2025-01-06 RX ADMIN — GUAIFENESIN 600 MG: 600 TABLET, EXTENDED RELEASE ORAL at 08:24

## 2025-01-06 RX ADMIN — ENOXAPARIN SODIUM 40 MG: 40 INJECTION SUBCUTANEOUS at 08:24

## 2025-01-06 RX ADMIN — ACETAMINOPHEN 650 MG: 325 TABLET, FILM COATED ORAL at 08:32

## 2025-01-06 RX ADMIN — ACETAMINOPHEN 650 MG: 325 TABLET, FILM COATED ORAL at 21:54

## 2025-01-06 NOTE — ASSESSMENT & PLAN NOTE
Patient presents with a few days of upper respiratory tract infection associated with diarrhea  Tested positive for influenza A  Chest x-ray negative for acute cardiopulmonary disease  No leukocytosis, procalcitonin and lactic acid negative  Start Tamiflu 30 mg twice daily due to creatinine clearance  IV fluids  Mucinex/Tessalon Perles  Incentive spirometry

## 2025-01-06 NOTE — ASSESSMENT & PLAN NOTE
Acute hypoxic respiratory failure secondary to influenza A  Chest x-ray negative for acute cardiopulmonary disease  Initially patient did not required any oxygen, home O2 eval was done on room air she was 92% with ambulation  Upon reevaluation by ED physician, her oxygen saturation was in the high 80s on room air  Supportive care  Incentive spirometry  Currently utilizing 2L NC with saturations at 93%, continue to wean as able

## 2025-01-06 NOTE — ASSESSMENT & PLAN NOTE
Complaining of diarrhea x4 episodes today  Denies N/V, abdominal pain or discomfort   Reports eating and drinking without difficulty  Offered imodium however patient unsure if she wants to take it - will be placed PRN

## 2025-01-06 NOTE — PROGRESS NOTES
Progress Note - Hospitalist   Name: Elvira Dejesus 59 y.o. female I MRN: [id number]  Unit/Bed#: [place] [address] -01 I Date of Admission: 1/5/2025   Date of Service: 1/6/2025 I Hospital Day: 0    Assessment & Plan  Acute respiratory failure with hypoxia (HCC)  Acute hypoxic respiratory failure secondary to influenza A  Chest x-ray negative for acute cardiopulmonary disease  Initially patient did not required any oxygen, home O2 eval was done on room air she was 92% with ambulation  Upon reevaluation by ED physician, her oxygen saturation was in the high 80s on room air  Supportive care  Incentive spirometry  Currently utilizing 2L NC with saturations at 93%, continue to wean as able   Influenza A  Patient presents with a few days of upper respiratory tract infection associated with diarrhea  Tested positive for influenza A  Chest x-ray negative for acute cardiopulmonary disease  No leukocytosis, procalcitonin and lactic acid negative  Start Tamiflu 30 mg twice daily due to creatinine clearance  IV fluids  Mucinex/Tessalon Perles  Incentive spirometry  Seizure (HCC)  Continue Topamax 100 mg twice daily  Last seizure episode was over 10 years ago  Diarrhea  Complaining of diarrhea x4 episodes today  Denies N/V, abdominal pain or discomfort   Reports eating and drinking without difficulty  Offered imodium however patient unsure if she wants to take it - will be placed PRN  Hypokalemia  3.4 on am labs  Replete   Continue to monitor and replete indicated     VTE Pharmacologic Prophylaxis: VTE Score: 6 High Risk (Score >/= 5) - Pharmacological DVT Prophylaxis Ordered: enoxaparin (Lovenox). Sequential Compression Devices Ordered.    Mobility:   Basic Mobility Inpatient Raw Score: 24  JH-HLM Goal: 8: Walk 250 feet or more  JH-HLM Goal achieved. Continue to encourage appropriate mobility.    Patient Centered Rounds: I performed bedside rounds with nursing staff today.   Discussions with Specialists or Other Care Team Provider: None      Education and Discussions with Family / Patient: Updated  () at bedside.    Current Length of Stay: 0 day(s)  Current Patient Status: Observation   Certification Statement: The patient will continue to require additional inpatient hospital stay due to continued IV hydration, wean supplemental O2, inability to tolerate oral intake   Discharge Plan: Anticipate discharge tomorrow to home.    Code Status: Level 1 - Full Code    Subjective   Seen and examined. No acute events overnight. States she feels crappy today. States she started with diarrhea today however does not know if she wants imodium or not. Still without much of an appetite.     Objective :  Temp:  [98 °F (36.7 °C)-101.6 °F (38.7 °C)] 99.2 °F (37.3 °C)  HR:  [75-94] 81  BP: ()/(53-81) 103/65  Resp:  [16-24] 16  SpO2:  [90 %-96 %] 93 %  O2 Device: Nasal cannula  Nasal Cannula O2 Flow Rate (L/min):  [2 L/min] 2 L/min    Body mass index is 38.89 kg/m².     Input and Output Summary (last 24 hours):     Intake/Output Summary (Last 24 hours) at 1/6/2025 0756  Last data filed at 1/6/2025 0716  Gross per 24 hour   Intake 2943.33 ml   Output --   Net 2943.33 ml       Physical Exam  Constitutional:       General: She is not in acute distress.     Appearance: She is obese. She is ill-appearing (chronically).   HENT:      Head: Normocephalic.      Nose: Nose normal.      Mouth/Throat:      Mouth: Mucous membranes are moist.   Eyes:      Conjunctiva/sclera: Conjunctivae normal.   Cardiovascular:      Heart sounds: Normal heart sounds.   Pulmonary:      Breath sounds: Wheezing and rhonchi present.   Abdominal:      Palpations: Abdomen is soft.   Musculoskeletal:      Right lower leg: No edema.      Left lower leg: No edema.   Skin:     General: Skin is warm.   Neurological:      Mental Status: Mental status is at baseline.         Lines/Drains:      Lab Results: I have reviewed the following results:   Results from last 7 days   Lab Units  01/05/25  1813   WBC Thousand/uL 5.29   HEMOGLOBIN g/dL 14.5   HEMATOCRIT % 44.6   PLATELETS Thousands/uL 227   SEGS PCT % 56   LYMPHO PCT % 32   MONO PCT % 10   EOS PCT % 1     Results from last 7 days   Lab Units 01/05/25  1813   SODIUM mmol/L 135   POTASSIUM mmol/L 3.6   CHLORIDE mmol/L 104   CO2 mmol/L 23   BUN mg/dL 18   CREATININE mg/dL 1.09   ANION GAP mmol/L 8   CALCIUM mg/dL 8.5   ALBUMIN g/dL 4.2   TOTAL BILIRUBIN mg/dL 0.28   ALK PHOS U/L 81   ALT U/L 36   AST U/L 28   GLUCOSE RANDOM mg/dL 92     Results from last 7 days   Lab Units 01/05/25  1813   INR  1.06             Results from last 7 days   Lab Units 01/05/25  1813   LACTIC ACID mmol/L 0.9   PROCALCITONIN ng/ml 0.15       Recent Cultures (last 7 days):   Results from last 7 days   Lab Units 01/05/25  1813 01/05/25  1812   BLOOD CULTURE  Received in Microbiology Lab. Culture in Progress. Received in Microbiology Lab. Culture in Progress.       Imaging Results Review: No pertinent imaging studies reviewed.  Other Study Results Review: No additional pertinent studies reviewed.    Last 24 Hours Medication List:     Current Facility-Administered Medications:     acetaminophen (TYLENOL) tablet 650 mg, Q6H PRN    benzonatate (TESSALON PERLES) capsule 100 mg, TID PRN    enoxaparin (LOVENOX) subcutaneous injection 40 mg, Daily    guaiFENesin (MUCINEX) 12 hr tablet 600 mg, Q12H ANGELA    ondansetron (ZOFRAN) injection 4 mg, Q6H PRN    oseltamivir (TAMIFLU) capsule 30 mg, Q12H ANGELA    sodium chloride 0.9 % infusion, Continuous, Last Rate: 100 mL/hr (01/06/25 0716)    topiramate (TOPAMAX) 100 mg, patient supplied medication 0.001 each, BID    Administrative Statements   Today, Patient Was Seen By: Vale Davis PA-C    **Please Note: This note may have been constructed using a voice recognition system.**
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
## 2025-01-06 NOTE — PLAN OF CARE
Problem: INFECTION - ADULT  Goal: Absence or prevention of progression during hospitalization  Description: INTERVENTIONS:  - Assess and monitor for signs and symptoms of infection  - Monitor lab/diagnostic results  - Monitor all insertion sites, i.e. indwelling lines, tubes, and drains  - Monitor endotracheal if appropriate and nasal secretions for changes in amount and color  - [place] appropriate cooling/warming therapies per order  - Administer medications as ordered  - Instruct and encourage patient and family to use good hand hygiene technique  - Identify and instruct in appropriate isolation precautions for identified infection/condition  Outcome: Progressing  Goal: Absence of fever/infection during neutropenic period  Description: INTERVENTIONS:  - Monitor WBC    Outcome: Progressing     Problem: Nutrition/Hydration-ADULT  Goal: Nutrient/Hydration intake appropriate for improving, restoring or maintaining nutritional needs  Description: Monitor and assess patient's nutrition/hydration status for malnutrition. Collaborate with interdisciplinary team and initiate plan and interventions as ordered.  Monitor patient's weight and dietary intake as ordered or per policy. Utilize nutrition screening tool and intervene as necessary. Determine patient's food preferences and provide high-protein, high-caloric foods as appropriate.     INTERVENTIONS:  - Monitor oral intake, urinary output, labs, and treatment plans  - Assess nutrition and hydration status and recommend course of action  - Evaluate amount of meals eaten  - Assist patient with eating if necessary   - Allow adequate time for meals  - Recommend/ encourage appropriate diets, oral nutritional supplements, and vitamin/mineral supplements  - Order, calculate, and assess calorie counts as needed  - Recommend, monitor, and adjust tube feedings and TPN/PPN based on assessed needs  - Assess need for intravenous fluids  - Provide specific nutrition/hydration  education as appropriate  - Include patient/family/caregiver in decisions related to nutrition  Outcome: Progressing     Problem: RESPIRATORY - ADULT  Goal: Achieves optimal ventilation and oxygenation  Description: INTERVENTIONS:  - Assess for changes in respiratory status  - Assess for changes in mentation and behavior  - Position to facilitate oxygenation and minimize respiratory effort  - Oxygen administered by appropriate delivery if ordered  - Initiate smoking cessation education as indicated  - Encourage broncho-pulmonary hygiene including cough, deep breathe, Incentive Spirometry  - Assess the need for suctioning and aspirate as needed  - Assess and instruct to report SOB or any respiratory difficulty  - Respiratory Therapy support as indicated  Outcome: Progressing
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
## 2025-01-06 NOTE — ED NOTES
Pts o2 saturation noted to be 88% on room air while resting - pt placed on 2LPM by Dr. Yelena Echols, RN  01/05/25 1954

## 2025-01-06 NOTE — ASSESSMENT & PLAN NOTE
Acute hypoxic respiratory failure secondary to influenza A  Chest x-ray negative for acute cardiopulmonary disease  Initially patient did not required any oxygen, home O2 eval was done on room air she was 92% with ambulation  Upon reevaluation by ED physician, her oxygen saturation was in the high 80s on room air  Supportive care  Incentive spirometry  Home O2 eval tomorrow

## 2025-01-06 NOTE — PLAN OF CARE
Problem: INFECTION - ADULT  Goal: Absence or prevention of progression during hospitalization  Description: INTERVENTIONS:  - Assess and monitor for signs and symptoms of infection  - Monitor lab/diagnostic results  - Monitor all insertion sites, i.e. indwelling lines, tubes, and drains  - Monitor endotracheal if appropriate and nasal secretions for changes in amount and color  - [place] appropriate cooling/warming therapies per order  - Administer medications as ordered  - Instruct and encourage patient and family to use good hand hygiene technique  - Identify and instruct in appropriate isolation precautions for identified infection/condition  Outcome: Progressing  Goal: Absence of fever/infection during neutropenic period  Description: INTERVENTIONS:  - Monitor WBC    Outcome: Progressing     Problem: Nutrition/Hydration-ADULT  Goal: Nutrient/Hydration intake appropriate for improving, restoring or maintaining nutritional needs  Description: Monitor and assess patient's nutrition/hydration status for malnutrition. Collaborate with interdisciplinary team and initiate plan and interventions as ordered.  Monitor patient's weight and dietary intake as ordered or per policy. Utilize nutrition screening tool and intervene as necessary. Determine patient's food preferences and provide high-protein, high-caloric foods as appropriate.     INTERVENTIONS:  - Monitor oral intake, urinary output, labs, and treatment plans  - Assess nutrition and hydration status and recommend course of action  - Evaluate amount of meals eaten  - Assist patient with eating if necessary   - Allow adequate time for meals  - Recommend/ encourage appropriate diets, oral nutritional supplements, and vitamin/mineral supplements  - Order, calculate, and assess calorie counts as needed  - Recommend, monitor, and adjust tube feedings and TPN/PPN based on assessed needs  - Assess need for intravenous fluids  - Provide specific nutrition/hydration  education as appropriate  - Include patient/family/caregiver in decisions related to nutrition  Outcome: Progressing     Problem: RESPIRATORY - ADULT  Goal: Achieves optimal ventilation and oxygenation  Description: INTERVENTIONS:  - Assess for changes in respiratory status  - Assess for changes in mentation and behavior  - Position to facilitate oxygenation and minimize respiratory effort  - Oxygen administered by appropriate delivery if ordered  - Initiate smoking cessation education as indicated  - Encourage broncho-pulmonary hygiene including cough, deep breathe, Incentive Spirometry  - Assess the need for suctioning and aspirate as needed  - Assess and instruct to report SOB or any respiratory difficulty  - Respiratory Therapy support as indicated  Outcome: Progressing

## 2025-01-07 VITALS
OXYGEN SATURATION: 92 % | WEIGHT: 186.07 LBS | BODY MASS INDEX: 39.06 KG/M2 | HEART RATE: 74 BPM | DIASTOLIC BLOOD PRESSURE: 66 MMHG | RESPIRATION RATE: 20 BRPM | SYSTOLIC BLOOD PRESSURE: 103 MMHG | HEIGHT: 58 IN | TEMPERATURE: 98.4 F

## 2025-01-07 PROBLEM — E87.6 HYPOKALEMIA: Status: RESOLVED | Noted: 2025-01-06 | Resolved: 2025-01-07

## 2025-01-07 PROBLEM — J96.01 ACUTE RESPIRATORY FAILURE WITH HYPOXIA (HCC): Status: RESOLVED | Noted: 2025-01-05 | Resolved: 2025-01-07

## 2025-01-07 LAB
ANION GAP SERPL CALCULATED.3IONS-SCNC: 5 MMOL/L (ref 4–13)
BUN SERPL-MCNC: 11 MG/DL (ref 5–25)
CALCIUM SERPL-MCNC: 7.6 MG/DL (ref 8.4–10.2)
CHLORIDE SERPL-SCNC: 111 MMOL/L (ref 96–108)
CO2 SERPL-SCNC: 24 MMOL/L (ref 21–32)
CREAT SERPL-MCNC: 0.92 MG/DL (ref 0.6–1.3)
ERYTHROCYTE [DISTWIDTH] IN BLOOD BY AUTOMATED COUNT: 13 % (ref 11.6–15.1)
GFR SERPL CREATININE-BSD FRML MDRD: 67 ML/MIN/1.73SQ M
GLUCOSE SERPL-MCNC: 110 MG/DL (ref 65–140)
HCT VFR BLD AUTO: 37.7 % (ref 34.8–46.1)
HGB BLD-MCNC: 12.1 G/DL (ref 11.5–15.4)
MCH RBC QN AUTO: 29.3 PG (ref 26.8–34.3)
MCHC RBC AUTO-ENTMCNC: 32.1 G/DL (ref 31.4–37.4)
MCV RBC AUTO: 91 FL (ref 82–98)
PLATELET # BLD AUTO: 167 THOUSANDS/UL (ref 149–390)
PMV BLD AUTO: 9.6 FL (ref 8.9–12.7)
POTASSIUM SERPL-SCNC: 3.8 MMOL/L (ref 3.5–5.3)
PROCALCITONIN SERPL-MCNC: 0.13 NG/ML
RBC # BLD AUTO: 4.13 MILLION/UL (ref 3.81–5.12)
SODIUM SERPL-SCNC: 140 MMOL/L (ref 135–147)
WBC # BLD AUTO: 4.13 THOUSAND/UL (ref 4.31–10.16)

## 2025-01-07 PROCEDURE — 99239 HOSP IP/OBS DSCHRG MGMT >30: CPT | Performed by: HOSPITALIST

## 2025-01-07 PROCEDURE — 85027 COMPLETE CBC AUTOMATED: CPT

## 2025-01-07 PROCEDURE — 84145 PROCALCITONIN (PCT): CPT

## 2025-01-07 PROCEDURE — 80048 BASIC METABOLIC PNL TOTAL CA: CPT

## 2025-01-07 RX ORDER — BENZONATATE 100 MG/1
100 CAPSULE ORAL 3 TIMES DAILY
Status: DISCONTINUED | OUTPATIENT
Start: 2025-01-07 | End: 2025-01-07 | Stop reason: HOSPADM

## 2025-01-07 RX ORDER — BENZONATATE 100 MG/1
100 CAPSULE ORAL 3 TIMES DAILY
Qty: 21 CAPSULE | Refills: 0 | Status: SHIPPED | OUTPATIENT
Start: 2025-01-07

## 2025-01-07 RX ORDER — OSELTAMIVIR PHOSPHATE 75 MG/1
75 CAPSULE ORAL EVERY 12 HOURS SCHEDULED
Qty: 6 CAPSULE | Refills: 0 | Status: SHIPPED | OUTPATIENT
Start: 2025-01-07 | End: 2025-01-10

## 2025-01-07 RX ORDER — OSELTAMIVIR PHOSPHATE 75 MG/1
75 CAPSULE ORAL EVERY 12 HOURS SCHEDULED
Status: DISCONTINUED | OUTPATIENT
Start: 2025-01-07 | End: 2025-01-07 | Stop reason: HOSPADM

## 2025-01-07 RX ADMIN — BENZONATATE 100 MG: 100 CAPSULE ORAL at 11:54

## 2025-01-07 RX ADMIN — ACETAMINOPHEN 650 MG: 325 TABLET, FILM COATED ORAL at 04:22

## 2025-01-07 RX ADMIN — GUAIFENESIN 600 MG: 600 TABLET, EXTENDED RELEASE ORAL at 08:36

## 2025-01-07 RX ADMIN — TOPIRAMATE: 100 TABLET, FILM COATED ORAL at 08:36

## 2025-01-07 RX ADMIN — ENOXAPARIN SODIUM 40 MG: 40 INJECTION SUBCUTANEOUS at 08:36

## 2025-01-07 RX ADMIN — OSELTAMIVIR PHOSPHATE 30 MG: 30 CAPSULE ORAL at 08:36

## 2025-01-07 RX ADMIN — SODIUM CHLORIDE 100 ML/HR: 0.9 INJECTION, SOLUTION INTRAVENOUS at 04:23

## 2025-01-07 NOTE — DISCHARGE INSTR - AVS FIRST PAGE
Elvira,     Please finish your Tamiflu course.  Take this with food.  You can take your next dose tonight.      You were sent with medications to help with your cough over-the-counter as needed.      Stay well-hydrated and follow-up with your family doctor in 1 week

## 2025-01-07 NOTE — NURSING NOTE
Pt oxygen 93% on room air sitting on edge of bed.  The patient ambulated to the bathroom and showered.  Oxygen saturations noted 90-92% with exertion.

## 2025-01-07 NOTE — ASSESSMENT & PLAN NOTE
Patient was having multiple loose stools.  This has resolved as of this a.m. with a near normal bowel movement  She is tolerating food well without any abdominal pain nausea or vomiting.  No electrolyte disturbances today  Resolving

## 2025-01-07 NOTE — PLAN OF CARE
Problem: INFECTION - ADULT  Goal: Absence or prevention of progression during hospitalization  Description: INTERVENTIONS:  - Assess and monitor for signs and symptoms of infection  - Monitor lab/diagnostic results  - Monitor all insertion sites, i.e. indwelling lines, tubes, and drains  - Monitor endotracheal if appropriate and nasal secretions for changes in amount and color  - [place] appropriate cooling/warming therapies per order  - Administer medications as ordered  - Instruct and encourage patient and family to use good hand hygiene technique  - Identify and instruct in appropriate isolation precautions for identified infection/condition  Outcome: Progressing  Goal: Absence of fever/infection during neutropenic period  Description: INTERVENTIONS:  - Monitor WBC    Outcome: Progressing     Problem: Nutrition/Hydration-ADULT  Goal: Nutrient/Hydration intake appropriate for improving, restoring or maintaining nutritional needs  Description: Monitor and assess patient's nutrition/hydration status for malnutrition. Collaborate with interdisciplinary team and initiate plan and interventions as ordered.  Monitor patient's weight and dietary intake as ordered or per policy. Utilize nutrition screening tool and intervene as necessary. Determine patient's food preferences and provide high-protein, high-caloric foods as appropriate.     INTERVENTIONS:  - Monitor oral intake, urinary output, labs, and treatment plans  - Assess nutrition and hydration status and recommend course of action  - Evaluate amount of meals eaten  - Assist patient with eating if necessary   - Allow adequate time for meals  - Recommend/ encourage appropriate diets, oral nutritional supplements, and vitamin/mineral supplements  - Order, calculate, and assess calorie counts as needed  - Recommend, monitor, and adjust tube feedings and TPN/PPN based on assessed needs  - Assess need for intravenous fluids  - Provide specific nutrition/hydration  education as appropriate  - Include patient/family/caregiver in decisions related to nutrition  Outcome: Progressing     Problem: RESPIRATORY - ADULT  Goal: Achieves optimal ventilation and oxygenation  Description: INTERVENTIONS:  - Assess for changes in respiratory status  - Assess for changes in mentation and behavior  - Position to facilitate oxygenation and minimize respiratory effort  - Oxygen administered by appropriate delivery if ordered  - Initiate smoking cessation education as indicated  - Encourage broncho-pulmonary hygiene including cough, deep breathe, Incentive Spirometry  - Assess the need for suctioning and aspirate as needed  - Assess and instruct to report SOB or any respiratory difficulty  - Respiratory Therapy support as indicated  Outcome: Progressing

## 2025-01-07 NOTE — ASSESSMENT & PLAN NOTE
Presented with congestion diarrhea tested positive for influenza A 1/5/2025.  No evidence of pneumonia or bacterial coinfection.  Chest x-ray and procalcitonin unremarkable  Started on Tamiflu, continue on discharge to complete 5-day course  Patient symptoms are slowly improving.  Continue supportive care medications as needed on discharge    Estimated Creatinine Clearance: 62.9 mL/min (by C-G formula based on SCr of 0.92 mg/dL).

## 2025-01-07 NOTE — PLAN OF CARE
Problem: INFECTION - ADULT  Goal: Absence or prevention of progression during hospitalization  Description: INTERVENTIONS:  - Assess and monitor for signs and symptoms of infection  - Monitor lab/diagnostic results  - Monitor all insertion sites, i.e. indwelling lines, tubes, and drains  - Monitor endotracheal if appropriate and nasal secretions for changes in amount and color  - [place] appropriate cooling/warming therapies per order  - Administer medications as ordered  - Instruct and encourage patient and family to use good hand hygiene technique  - Identify and instruct in appropriate isolation precautions for identified infection/condition  1/7/2025 1448 by Alisa Augustin RN  Outcome: Adequate for Discharge  1/7/2025 0941 by Alisa Augustin RN  Outcome: Progressing  Goal: Absence of fever/infection during neutropenic period  Description: INTERVENTIONS:  - Monitor WBC    1/7/2025 1448 by Alisa Augustin RN  Outcome: Adequate for Discharge  1/7/2025 0941 by Alisa Augustin RN  Outcome: Progressing     Problem: Nutrition/Hydration-ADULT  Goal: Nutrient/Hydration intake appropriate for improving, restoring or maintaining nutritional needs  Description: Monitor and assess patient's nutrition/hydration status for malnutrition. Collaborate with interdisciplinary team and initiate plan and interventions as ordered.  Monitor patient's weight and dietary intake as ordered or per policy. Utilize nutrition screening tool and intervene as necessary. Determine patient's food preferences and provide high-protein, high-caloric foods as appropriate.     INTERVENTIONS:  - Monitor oral intake, urinary output, labs, and treatment plans  - Assess nutrition and hydration status and recommend course of action  - Evaluate amount of meals eaten  - Assist patient with eating if necessary   - Allow adequate time for meals  - Recommend/ encourage appropriate diets, oral nutritional supplements, and vitamin/mineral supplements  - Order,  calculate, and assess calorie counts as needed  - Recommend, monitor, and adjust tube feedings and TPN/PPN based on assessed needs  - Assess need for intravenous fluids  - Provide specific nutrition/hydration education as appropriate  - Include patient/family/caregiver in decisions related to nutrition  1/7/2025 1448 by Alisa Augustin RN  Outcome: Adequate for Discharge  1/7/2025 0941 by Alisa Augustin RN  Outcome: Progressing     Problem: RESPIRATORY - ADULT  Goal: Achieves optimal ventilation and oxygenation  Description: INTERVENTIONS:  - Assess for changes in respiratory status  - Assess for changes in mentation and behavior  - Position to facilitate oxygenation and minimize respiratory effort  - Oxygen administered by appropriate delivery if ordered  - Initiate smoking cessation education as indicated  - Encourage broncho-pulmonary hygiene including cough, deep breathe, Incentive Spirometry  - Assess the need for suctioning and aspirate as needed  - Assess and instruct to report SOB or any respiratory difficulty  - Respiratory Therapy support as indicated  1/7/2025 1448 by Alisa Augustin RN  Outcome: Adequate for Discharge  1/7/2025 0941 by Alisa Augustin RN  Outcome: Progressing

## 2025-01-07 NOTE — ASSESSMENT & PLAN NOTE
Suspect viral in setting of flu  Cxr without pneumonia, procalcitonin negative x 2  Continue supportive care  No wheezing to indicate need for steroids or bronchodilators at this time

## 2025-01-07 NOTE — ASSESSMENT & PLAN NOTE
Patient was requiring 2 L nasal cannula to maintain oxygen saturation above 90%  She has been weaned off onto room air and been ambulating around the room maintaining above 90%  No indication for oxygen on discharge

## 2025-01-08 ENCOUNTER — TRANSITIONAL CARE MANAGEMENT (OUTPATIENT)
Age: 60
End: 2025-01-08

## 2025-01-08 ENCOUNTER — PATIENT OUTREACH (OUTPATIENT)
Dept: CASE MANAGEMENT | Facility: OTHER | Age: 60
End: 2025-01-08

## 2025-01-08 DIAGNOSIS — Z71.89 COMPLEX CARE COORDINATION: Primary | ICD-10-CM

## 2025-01-08 NOTE — PROGRESS NOTES
In basket message received with a referral from the HRR report. Chart reviewed.  Elvira was admitted to [facility] 1/5-1/7 with Influenza A, diarrhea, seizure and respiratory failure with hypoxia. She discharged home to self care.  I spoke with Elvira who denies chills or fever but has a nonproductive cough. She asked if she could use Mucinex since she did receive it in the hospital.She has some at home and I advised her to take it if she thinks it helps.I went over her medications and she is taking them as directed.I advised her to stay hydrated. She has an appointment with her PCP on 1/13.I advised her if she develops fever or shortness of breath she should call her PCP or return to the ED.  She has no needs at this time.She has my contact information and did consent to another call.

## 2025-01-11 LAB
BACTERIA BLD CULT: NORMAL
BACTERIA BLD CULT: NORMAL

## 2025-01-13 ENCOUNTER — OFFICE VISIT (OUTPATIENT)
Age: 60
End: 2025-01-13
Payer: COMMERCIAL

## 2025-01-13 VITALS
SYSTOLIC BLOOD PRESSURE: 102 MMHG | WEIGHT: 187.8 LBS | HEIGHT: 58 IN | BODY MASS INDEX: 39.42 KG/M2 | HEART RATE: 72 BPM | TEMPERATURE: 98.1 F | DIASTOLIC BLOOD PRESSURE: 70 MMHG | OXYGEN SATURATION: 93 %

## 2025-01-13 DIAGNOSIS — R19.7 DIARRHEA, UNSPECIFIED TYPE: ICD-10-CM

## 2025-01-13 DIAGNOSIS — R56.9 SEIZURE (HCC): ICD-10-CM

## 2025-01-13 DIAGNOSIS — R11.2 NAUSEA AND VOMITING, UNSPECIFIED VOMITING TYPE: ICD-10-CM

## 2025-01-13 DIAGNOSIS — E87.6 HYPOKALEMIA: ICD-10-CM

## 2025-01-13 DIAGNOSIS — J10.1 INFLUENZA A: Primary | ICD-10-CM

## 2025-01-13 DIAGNOSIS — J96.01 ACUTE RESPIRATORY FAILURE WITH HYPOXIA (HCC): ICD-10-CM

## 2025-01-13 PROCEDURE — 99496 TRANSJ CARE MGMT HIGH F2F 7D: CPT | Performed by: FAMILY MEDICINE

## 2025-01-13 RX ORDER — ALBUTEROL SULFATE 90 UG/1
2 INHALANT RESPIRATORY (INHALATION) EVERY 4 HOURS PRN
Qty: 18 G | Refills: 1 | Status: SHIPPED | OUTPATIENT
Start: 2025-01-13

## 2025-01-13 RX ORDER — ONDANSETRON 4 MG/1
4 TABLET, FILM COATED ORAL EVERY 8 HOURS PRN
Qty: 30 TABLET | Refills: 1 | Status: SHIPPED | OUTPATIENT
Start: 2025-01-13

## 2025-01-13 NOTE — PROGRESS NOTES
Transition of Care Visit  Name: Elvira Dejesus      : 1965      MRN: [id number]  Encounter Provider: Cuauhtemoc Choi DO  Encounter Date: 2025   Encounter department: [facility] PRIMARY CARE    Assessment & Plan  Influenza A    Orders:    albuterol (Ventolin HFA) 90 mcg/act inhaler; Inhale 2 puffs every 4 (four) hours as needed for wheezing    Acute respiratory failure with hypoxia (HCC)    Orders:    albuterol (Ventolin HFA) 90 mcg/act inhaler; Inhale 2 puffs every 4 (four) hours as needed for wheezing    Diarrhea, unspecified type         Hypokalemia         Nausea and vomiting, unspecified vomiting type    Orders:    ondansetron (ZOFRAN) 4 mg tablet; Take 1 tablet (4 mg total) by mouth every 8 (eight) hours as needed for nausea or vomiting    Seizure (HCC)              History of Present Illness     Transitional Care Management Review:   Elvira Dejesus is a 59 y.o. female here for TCM follow up.     During the TCM phone call patient stated:  TCM Call       Date and time call was made  2025  8:30 AM    Patient was hospitialized at  [facility]    Date of Admission  25    Date of discharge  25    Diagnosis  INFLUENZA A    Disposition  Home    Were the patients medications reviewed and updated  Yes    Current Symptoms  None          TCM Call       Post hospital issues  None    Should patient be enrolled in anticoag monitoring?  No    Scheduled for follow up?  Yes    Did you obtain your prescribed medications  Yes    Do you need help managing your prescriptions or medications  No    Is transportation to your appointment needed  No    I have advised the patient to call PCP with any new or worsening symptoms  TAWANDA DAS CMA    Living Arrangements  Spouse or Significiant other    Support System  Spouse    The type of support provided  Emotional; Financial; Physical    Do you have social support  Yes, as much as I need    Are you recieving any outpatient services  No    Are you  "recieving home care services  No    Are you using any community resources  No    Current waiver services  No    Have you fallen in the last 12 months  No    Interperter language line needed  No    Counseling  Patient          Patient is here status post hospitalization from January 5 through 7 for influenza A as well as acute respiratory failure/hypoxia.  Patient also found to be hypokalemic and this was replaced.  Chest x-ray negative.  Oxygen and saturation was 98%.  Patient still feeling rundown.  Patient not eating as much.  Patient did have diarrhea the other day.  Slight improvement noted.  Patient with some nausea but no vomiting.  Patient completed Tamiflu.  Patient still on Tessalon Perles as well as Mucinex.  Patient also with bronchitis.  Still coughing.      Review of Systems   Constitutional:  Positive for fatigue. Negative for fever.   HENT: Negative.     Eyes: Negative.    Respiratory:  Positive for cough.    Cardiovascular: Negative.    Gastrointestinal:  Positive for diarrhea.   Endocrine: Negative.    Genitourinary: Negative.    Musculoskeletal: Negative.    Skin: Negative.    Allergic/Immunologic: Negative.    Neurological: Negative.    Hematological: Negative.    Psychiatric/Behavioral: Negative.       Objective   /70 (BP Location: Right arm, Patient Position: Sitting, Cuff Size: Large)   Pulse 72   Temp 98.1 °F (36.7 °C) (Temporal)   Ht 4' 10\" (1.473 m)   Wt 85.2 kg (187 lb 12.8 oz)   LMP 08/01/2000 (Approximate)   SpO2 93%   BMI 39.25 kg/m²     Physical Exam  Vitals and nursing note reviewed.   Constitutional:       General: She is not in acute distress.     Appearance: She is well-developed. She is ill-appearing. She is not toxic-appearing or diaphoretic.   HENT:      Head: Normocephalic and atraumatic.      Right Ear: Tympanic membrane, ear canal and external ear normal.      Left Ear: Tympanic membrane, ear canal and external ear normal.      Nose: Nose normal. No congestion or " rhinorrhea.      Mouth/Throat:      Mouth: Mucous membranes are moist.      Pharynx: No oropharyngeal exudate or posterior oropharyngeal erythema.   Eyes:      General:         Right eye: No discharge.         Left eye: No discharge.   Neck:      Thyroid: No thyromegaly.      Trachea: No tracheal deviation.   Cardiovascular:      Rate and Rhythm: Normal rate and regular rhythm.      Pulses: Normal pulses.      Heart sounds: Normal heart sounds. No murmur heard.     No gallop.   Pulmonary:      Effort: Pulmonary effort is normal. No respiratory distress.      Breath sounds: No stridor. Wheezing present. No rales.      Comments: Trace expiratory wheeze  Chest:      Chest wall: No tenderness.   Abdominal:      Tenderness: There is no abdominal tenderness.   Musculoskeletal:         General: No tenderness or deformity. Normal range of motion.      Cervical back: Normal range of motion and neck supple.   Lymphadenopathy:      Cervical: No cervical adenopathy.   Skin:     General: Skin is warm and dry.      Capillary Refill: Capillary refill takes less than 2 seconds.      Coloration: Skin is not pale.      Findings: No erythema or rash.   Neurological:      Mental Status: She is alert and oriented to person, place, and time.      Cranial Nerves: No cranial nerve deficit.      Motor: No abnormal muscle tone.      Coordination: Coordination normal.      Gait: Gait normal.      Deep Tendon Reflexes: Reflexes normal.   Psychiatric:         Mood and Affect: Mood normal.         Behavior: Behavior normal.         Thought Content: Thought content normal.         Judgment: Judgment normal.       Medications have been reviewed by provider in current encounter

## 2025-01-15 ENCOUNTER — PATIENT OUTREACH (OUTPATIENT)
Dept: CASE MANAGEMENT | Facility: OTHER | Age: 60
End: 2025-01-15

## 2025-01-15 NOTE — PROGRESS NOTES
I spoke with Elvira who reports she has a nonproductive cough and wheezing at times.She saw her PCP 1/13 and he ordered a Ventolin inhaler. I encouraged her to use it as directed.  She has a oximeter at home and her oxygen levels are normal.  She is taking Mucinex and Tessalon perles.  She has no needs.  We did review her appointments.
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
## 2025-01-21 ENCOUNTER — ANNUAL EXAM (OUTPATIENT)
Dept: OBGYN CLINIC | Facility: CLINIC | Age: 60
End: 2025-01-21
Payer: COMMERCIAL

## 2025-01-21 ENCOUNTER — PATIENT OUTREACH (OUTPATIENT)
Dept: CASE MANAGEMENT | Facility: OTHER | Age: 60
End: 2025-01-21

## 2025-01-21 VITALS
SYSTOLIC BLOOD PRESSURE: 104 MMHG | HEIGHT: 58 IN | WEIGHT: 189 LBS | BODY MASS INDEX: 39.67 KG/M2 | DIASTOLIC BLOOD PRESSURE: 70 MMHG

## 2025-01-21 DIAGNOSIS — Z01.419 WELL WOMAN EXAM: Primary | ICD-10-CM

## 2025-01-21 DIAGNOSIS — N89.8 VAGINAL DRYNESS: ICD-10-CM

## 2025-01-21 DIAGNOSIS — Z12.31 ENCOUNTER FOR SCREENING MAMMOGRAM FOR MALIGNANT NEOPLASM OF BREAST: ICD-10-CM

## 2025-01-21 DIAGNOSIS — N39.41 URGE INCONTINENCE OF URINE: ICD-10-CM

## 2025-01-21 PROCEDURE — 99396 PREV VISIT EST AGE 40-64: CPT | Performed by: PHYSICIAN ASSISTANT

## 2025-01-21 RX ORDER — ESTRADIOL 0.1 MG/G
CREAM VAGINAL
Qty: 42.5 G | Refills: 2 | Status: SHIPPED | OUTPATIENT
Start: 2025-01-21

## 2025-01-21 NOTE — PROGRESS NOTES
Elvira returned my call and reports she feels good and went back to work.   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  She works part-time for [employer].  I updated the care coordination note.

## 2025-01-28 ENCOUNTER — OFFICE VISIT (OUTPATIENT)
Dept: PODIATRY | Facility: CLINIC | Age: 60
End: 2025-01-28
Payer: COMMERCIAL

## 2025-01-28 VITALS — BODY MASS INDEX: 38.83 KG/M2 | WEIGHT: 185 LBS | HEIGHT: 58 IN

## 2025-01-28 DIAGNOSIS — B35.1 TINEA UNGUIUM: Primary | ICD-10-CM

## 2025-01-28 DIAGNOSIS — L60.8 TOENAIL DEFORMITY: ICD-10-CM

## 2025-01-28 DIAGNOSIS — B35.3 TINEA PEDIS OF LEFT FOOT: ICD-10-CM

## 2025-01-28 PROCEDURE — 99203 OFFICE O/P NEW LOW 30 MIN: CPT | Performed by: PODIATRIST

## 2025-01-28 RX ORDER — TERBINAFINE HYDROCHLORIDE 250 MG/1
250 TABLET ORAL DAILY
Qty: 14 TABLET | Refills: 2 | Status: SHIPPED | OUTPATIENT
Start: 2025-01-28 | End: 2025-03-11

## 2025-01-28 RX ORDER — CLOTRIMAZOLE 1 %
CREAM (GRAM) TOPICAL 2 TIMES DAILY
Qty: 113 G | Refills: 3 | Status: SHIPPED | OUTPATIENT
Start: 2025-01-28

## 2025-01-28 NOTE — PROGRESS NOTES
Name: Elvira Dejesus      : 1965      MRN: [id number]  Encounter Provider: Taiwo Sotomayor DPM  Encounter Date: 2025   Encounter department: [facility]  :  Assessment & Plan  Toenail deformity    Orders:  •  Ambulatory Referral to Podiatry    Tinea unguium  Discussed oral lamisil vs topical treatments for fungal toenails. Patient clearly told to abstain from any alcohol use while taking oral lamisil (terbinafine). Call with any GI distress or unusual side effects while taking this medication. Will pulse dose the medication to reduce hepatic risk.     Reviewed bloodwork, last CMP did not show any liver concerns. Patient instructed not to drink any alcohol while taking this medication    Patient to take 1 pill daily for 2 weeks (14 days) then stop for 2 weeks. Repeat this cycle two more times.     Orders:  •  terbinafine (LamISIL) 250 mg tablet; Take 1 tablet (250 mg total) by mouth daily take 1 pill daily for 2 weeks (14 days) then stop for 2 weeks. Repeat this cycle two more times.    Tinea pedis of left foot  Topical cream  Orders:  •  clotrimazole (LOTRIMIN) 1 % cream; Apply topically 2 (two) times a day                  Component  Ref Range & Units (hover) 25 1813 24 0957 24 0824 24 1421 22 0448 22 0827 22 1758   Sodium 135 140 R 139 140 141 140 139   Potassium 3.6 4.1 R 4.1 3.9 4.2 3.5 3.8   Chloride 104 106 R 107 109 High  105 103 101   CO2 23 24 R 28 22 28 27 27   ANION GAP 8 10 R 4 9 8 10 11   BUN 18 18 R 19 23 12 17 21   Creatinine 1.09 0.94 R 1.04 CM 0.99 CM 1.07 CM 1.00 CM 1.10 CM   Comment: Standardized to IDMS reference method   Glucose 92 153 High  R 168 High      CM   Comment: If the patient is fasting, the ADA then defines impaired fasting glucose as > 100 mg/dL and diabetes as > or equal to 123 mg/dL.   Calcium 8.5 9.4 R 9.5 9.2 8.4 R 8.8 R 9.7 R   AST 28 16 R 16 22   17 R, CM   ALT 36 29 R 29 CM 31 CM   34 R,  "   Comment: Specimen collection should occur prior to Sulfasalazine administration due to the potential for falsely depressed results.   Alkaline Phosphatase 81 77 R 85 74   92 R   Total Protein 7.7 7.2 R 7.5 7.4   8.8 High    Albumin 4.2 4.2 R 4.3 4.2   3.6   Total Bilirubin 0.28 0.3 R, CM 0.26 CM 0.31 CM             History of Present Illness   HPI  Elvira Dejesus is a 59 y.o. female who presents with left foot issues. Her right foot is normal but the nails on her left foot are thick, discolored. She did have nail trauma to the left great toenail years ago. She has a rash on her feet.       Review of Systems  As stated in HPI, otherwise normal    Medical History Reviewed by provider this encounter:  Tobacco  Allergies  Meds  Problems  Med Hx  Surg Hx  Fam Hx           Objective   Ht 4' 10\" (1.473 m)   Wt 83.9 kg (185 lb)   LMP 08/01/2000 (Approximate)   BMI 38.67 kg/m²      Physical Exam  Vitals reviewed.   Cardiovascular:      Rate and Rhythm: Normal rate.      Pulses: Normal pulses.           Dorsalis pedis pulses are 2+ on the right side and 2+ on the left side.        Posterior tibial pulses are 2+ on the right side and 2+ on the left side.   Pulmonary:      Effort: Pulmonary effort is normal. No respiratory distress.   Musculoskeletal:         General: No deformity.   Feet:      Left foot:      Skin integrity: Skin breakdown and dry skin present.      Toenail Condition: Left toenails are abnormally thick. Fungal disease present.  Skin:     General: Skin is warm.      Findings: Rash present. No erythema.   Neurological:      Mental Status: She is alert.      Sensory: No sensory deficit.           "

## 2025-02-03 ENCOUNTER — OFFICE VISIT (OUTPATIENT)
Age: 60
End: 2025-02-03
Payer: COMMERCIAL

## 2025-02-03 VITALS
HEIGHT: 58 IN | DIASTOLIC BLOOD PRESSURE: 86 MMHG | OXYGEN SATURATION: 97 % | BODY MASS INDEX: 38.62 KG/M2 | HEART RATE: 97 BPM | TEMPERATURE: 98.6 F | WEIGHT: 184 LBS | SYSTOLIC BLOOD PRESSURE: 132 MMHG

## 2025-02-03 DIAGNOSIS — N30.00 ACUTE CYSTITIS WITHOUT HEMATURIA: Primary | ICD-10-CM

## 2025-02-03 DIAGNOSIS — B35.1 ONYCHOMYCOSIS: ICD-10-CM

## 2025-02-03 DIAGNOSIS — J06.9 ACUTE URI: ICD-10-CM

## 2025-02-03 LAB
BACTERIA UR QL AUTO: ABNORMAL /HPF
BILIRUB UR QL STRIP: NEGATIVE
CLARITY UR: ABNORMAL
COLOR UR: ABNORMAL
GLUCOSE UR STRIP-MCNC: NEGATIVE MG/DL
HGB UR QL STRIP.AUTO: NEGATIVE
KETONES UR STRIP-MCNC: ABNORMAL MG/DL
LEUKOCYTE ESTERASE UR QL STRIP: NEGATIVE
MUCOUS THREADS UR QL AUTO: ABNORMAL
NITRITE UR QL STRIP: NEGATIVE
NON-SQ EPI CELLS URNS QL MICRO: ABNORMAL /HPF
PH UR STRIP.AUTO: 6 [PH]
PROT UR STRIP-MCNC: ABNORMAL MG/DL
RBC #/AREA URNS AUTO: ABNORMAL /HPF
SP GR UR STRIP.AUTO: 1.02 (ref 1–1.03)
UROBILINOGEN UR STRIP-ACNC: <2 MG/DL
WBC #/AREA URNS AUTO: ABNORMAL /HPF

## 2025-02-03 PROCEDURE — 87086 URINE CULTURE/COLONY COUNT: CPT | Performed by: FAMILY MEDICINE

## 2025-02-03 PROCEDURE — 99214 OFFICE O/P EST MOD 30 MIN: CPT | Performed by: FAMILY MEDICINE

## 2025-02-03 PROCEDURE — 87636 SARSCOV2 & INF A&B AMP PRB: CPT | Performed by: FAMILY MEDICINE

## 2025-02-03 PROCEDURE — 81001 URINALYSIS AUTO W/SCOPE: CPT | Performed by: FAMILY MEDICINE

## 2025-02-03 RX ORDER — LEVOFLOXACIN 500 MG/1
500 TABLET, FILM COATED ORAL EVERY 24 HOURS
Qty: 7 TABLET | Refills: 0 | Status: SHIPPED | OUTPATIENT
Start: 2025-02-03 | End: 2025-02-10

## 2025-02-03 NOTE — LETTER
February 11, 2025     Patient: Elvira Dejesus  YOB: 1965  Date of Visit: 2/3/2025      To Whom it May Concern:    Elvira Dejesus is under my professional care. Elvira was seen in my office on 2/3/2025. Elvira may return to work on 02/17/2025 .    If you have any questions or concerns, please don't hesitate to call.         Sincerely,          Cuauhtemoc Choi,         CC: No Recipients

## 2025-02-03 NOTE — PROGRESS NOTES
Assessment/Plan: Note to return to work Monday.       Diagnoses and all orders for this visit:    Acute cystitis without hematuria  Comments:  Patient is Levaquin as directed.  Urinalysis and urine culture done at this time.    Acute URI  Comments:  Supportive care recommended at this time.  Viral testing done at this time.  Patient on Levaquin.  Orders:  -     levofloxacin (LEVAQUIN) 500 mg tablet; Take 1 tablet (500 mg total) by mouth every 24 hours for 7 days    Onychomycosis  Comments:  Patient will use terbinafine as directed.  Guidance given in this regard.  Check LFTs 6 weeks after initiating treatment  Orders:  -     Comprehensive metabolic panel; Future            Subjective:        Patient ID: Elvira Dejesus is a 59 y.o. female.      Patient is here with dysuria as well as some suprapubic pressure since last Wednesday.  Urine was dark.  No gross hematuria.  No constipation.  No medication use in this regard.  Patient also here with cough congestion runny nose/sore throat fatigue since last Friday.  Patient was with the influenza earlier in January.    Cough  Associated symptoms include chills and a sore throat.   Sore Throat   Associated symptoms include congestion and coughing.   Urinary Tract Infection   Associated symptoms include chills. Pertinent negatives include no hematuria.         The following portions of the patient's history were reviewed and updated as appropriate: allergies, current medications, past family history, past medical history, past social history, past surgical history and problem list.      Review of Systems   Constitutional:  Positive for chills and fatigue.   HENT:  Positive for congestion and sore throat.    Eyes: Negative.    Respiratory:  Positive for cough.    Cardiovascular: Negative.    Gastrointestinal: Negative.    Endocrine: Negative.    Genitourinary:  Positive for difficulty urinating, dysuria and pelvic pain. Negative for hematuria.   Musculoskeletal: Negative.    Skin:  "Negative.    Allergic/Immunologic: Negative.    Neurological: Negative.    Hematological: Negative.    Psychiatric/Behavioral: Negative.             Objective:        Depression Screening and Follow-up Plan: Patient was screened for depression during today's encounter. They screened negative with a PHQ-2 score of 0.            /86 (BP Location: Left arm, Patient Position: Sitting, Cuff Size: Standard)   Pulse 97   Temp 98.6 °F (37 °C) (Temporal)   Ht 4' 10\" (1.473 m)   Wt 83.5 kg (184 lb)   LMP 08/01/2000 (Approximate)   SpO2 97%   BMI 38.46 kg/m²          Physical Exam  Vitals and nursing note reviewed.   Constitutional:       General: She is not in acute distress.     Appearance: Normal appearance. She is not ill-appearing, toxic-appearing or diaphoretic.   HENT:      Head: Normocephalic and atraumatic.      Right Ear: Tympanic membrane, ear canal and external ear normal. There is no impacted cerumen.      Left Ear: Tympanic membrane, ear canal and external ear normal. There is no impacted cerumen.      Nose: Nose normal. No congestion or rhinorrhea.      Mouth/Throat:      Mouth: Mucous membranes are moist.      Pharynx: Oropharyngeal exudate present. No posterior oropharyngeal erythema.   Eyes:      General: No scleral icterus.        Right eye: No discharge.         Left eye: No discharge.   Neck:      Vascular: No carotid bruit.   Cardiovascular:      Rate and Rhythm: Normal rate and regular rhythm.      Pulses: Normal pulses.      Heart sounds: Normal heart sounds. No murmur heard.     No friction rub. No gallop.   Pulmonary:      Effort: Pulmonary effort is normal. No respiratory distress.      Breath sounds: Normal breath sounds. No stridor. No wheezing, rhonchi or rales.   Chest:      Chest wall: No tenderness.   Musculoskeletal:         General: No swelling, tenderness, deformity or signs of injury. Normal range of motion.      Cervical back: Normal range of motion and neck supple. No " rigidity. No muscular tenderness.      Right lower leg: No edema.      Left lower leg: No edema.   Lymphadenopathy:      Cervical: No cervical adenopathy.   Skin:     General: Skin is warm and dry.      Capillary Refill: Capillary refill takes less than 2 seconds.      Coloration: Skin is not jaundiced.      Findings: No bruising, erythema, lesion or rash.   Neurological:      Mental Status: She is alert and oriented to person, place, and time. Mental status is at baseline.      Cranial Nerves: No cranial nerve deficit.      Sensory: No sensory deficit.      Motor: No weakness.      Coordination: Coordination normal.      Gait: Gait normal.   Psychiatric:         Mood and Affect: Mood normal.         Behavior: Behavior normal.         Thought Content: Thought content normal.         Judgment: Judgment normal.

## 2025-02-04 ENCOUNTER — RESULTS FOLLOW-UP (OUTPATIENT)
Age: 60
End: 2025-02-04

## 2025-02-04 DIAGNOSIS — U07.1 COVID-19: Primary | ICD-10-CM

## 2025-02-04 LAB
BACTERIA UR CULT: NORMAL
FLUAV RNA RESP QL NAA+PROBE: NEGATIVE
FLUBV RNA RESP QL NAA+PROBE: NEGATIVE
SARS-COV-2 RNA RESP QL NAA+PROBE: POSITIVE

## 2025-02-04 RX ORDER — NIRMATRELVIR AND RITONAVIR 300-100 MG
3 KIT ORAL 2 TIMES DAILY
Qty: 30 TABLET | Refills: 0 | Status: SHIPPED | OUTPATIENT
Start: 2025-02-04 | End: 2025-02-09

## 2025-02-05 ENCOUNTER — TELEPHONE (OUTPATIENT)
Age: 60
End: 2025-02-05

## 2025-02-05 NOTE — TELEPHONE ENCOUNTER
Patient reports thatlucas has taken 3 doses of paxlovid and she has a weird taste in her mouth from it. Told her that it is a common side effects. Many people describe it as a metallic taste.  States she will keep taking it but just wanted to know if she should be worried. No further questions.

## 2025-02-06 PROBLEM — J10.1 INFLUENZA A: Status: RESOLVED | Noted: 2025-01-05 | Resolved: 2025-02-06

## 2025-02-11 RX ORDER — BENZONATATE 200 MG/1
200 CAPSULE ORAL 3 TIMES DAILY PRN
Qty: 30 CAPSULE | Refills: 1 | Status: SHIPPED | OUTPATIENT
Start: 2025-02-11

## 2025-02-11 NOTE — TELEPHONE ENCOUNTER
Patient contacted the office back in regards to the levofloxacin (LEVAQUIN) 500 mg tablet. She states that when she was taking the Paxlovid she misunderstood and never stopped the Levaquin. She completed this. Asking if pcp would prescribe this again or if there is something else she should try with the current symptoms she is experiencing.    Please send prescription to [facility].

## 2025-02-11 NOTE — TELEPHONE ENCOUNTER
"Received call from patient   She states she is still having symptoms  Nasal congestion   Cough + sputum     Asking \"how long is this going to last\"  Advised viral infections can take up to 2 weeks for symptoms to subside.  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     Please advise.  "

## 2025-02-11 NOTE — RESULT ENCOUNTER NOTE
Spoke with pt, informed her new med will be sent to her pharmacy. Please review and sign.     Thank you

## 2025-02-11 NOTE — RESULT ENCOUNTER NOTE
Note completed, [employer] message sent to advise of note. What do you recommend as far as medication for her symptoms? Patient was seen 2/3/2025

## 2025-03-21 ENCOUNTER — APPOINTMENT (EMERGENCY)
Dept: NON INVASIVE DIAGNOSTICS | Facility: HOSPITAL | Age: 60
End: 2025-03-21
Payer: COMMERCIAL

## 2025-03-21 ENCOUNTER — HOSPITAL ENCOUNTER (EMERGENCY)
Facility: HOSPITAL | Age: 60
Discharge: HOME | End: 2025-03-21
Attending: EMERGENCY MEDICINE
Payer: COMMERCIAL

## 2025-03-21 VITALS
TEMPERATURE: 98.1 F | OXYGEN SATURATION: 95 % | DIASTOLIC BLOOD PRESSURE: 88 MMHG | SYSTOLIC BLOOD PRESSURE: 151 MMHG | RESPIRATION RATE: 17 BRPM | HEART RATE: 80 BPM

## 2025-03-21 DIAGNOSIS — M79.661 RIGHT CALF PAIN: Primary | ICD-10-CM

## 2025-03-21 PROCEDURE — 93971 EXTREMITY STUDY: CPT

## 2025-03-21 PROCEDURE — 99283 EMERGENCY DEPT VISIT LOW MDM: CPT

## 2025-03-21 PROCEDURE — 99284 EMERGENCY DEPT VISIT MOD MDM: CPT | Performed by: EMERGENCY MEDICINE
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
## 2025-03-22 PROCEDURE — 93971 EXTREMITY STUDY: CPT | Performed by: SURGERY

## 2025-03-22 NOTE — DISCHARGE INSTRUCTIONS
Ibuprofen 400-600 mg every 6 hours as needed  Tylenol 650-1000 mg every 4-6 hours as needed (Do not exceed 3000 mg in 24 hours)    Return to ED if new/worsening symptoms/concerns

## 2025-04-07 ENCOUNTER — HOSPITAL ENCOUNTER (OUTPATIENT)
Dept: MAMMOGRAPHY | Facility: MEDICAL CENTER | Age: 60
Discharge: HOME | End: 2025-04-07
Payer: COMMERCIAL

## 2025-04-07 VITALS — WEIGHT: 184 LBS | BODY MASS INDEX: 38.62 KG/M2 | HEIGHT: 58 IN

## 2025-04-07 DIAGNOSIS — Z12.31 ENCOUNTER FOR SCREENING MAMMOGRAM FOR MALIGNANT NEOPLASM OF BREAST: ICD-10-CM

## 2025-04-07 PROCEDURE — 77063 BREAST TOMOSYNTHESIS BI: CPT

## 2025-04-07 PROCEDURE — 77067 SCR MAMMO BI INCL CAD: CPT

## 2025-04-09 ENCOUNTER — RESULTS FOLLOW-UP (OUTPATIENT)
Dept: LABOR AND DELIVERY | Facility: HOSPITAL | Age: 60
End: 2025-04-09

## 2025-04-14 ENCOUNTER — TELEPHONE (OUTPATIENT)
Age: 60
End: 2025-04-14

## 2025-04-14 NOTE — TELEPHONE ENCOUNTER
Patient called and stated that [employer] got information that Dr. Choi no longer takes [employer].  The letter states that the PCP office needs to call them.  [facility] gave a number of [phone].

## 2025-04-14 NOTE — TELEPHONE ENCOUNTER
Last visit 02/03/2025  No upcoming appt     Patient mentioned that she received a new card from [employer] with a new doctor listed, Dr Ryan Purdy at [facility]. I mentioned to patient to call her insurance company to make sure they participate with the provider. Patient will call back if there are any issues. She also said she would rather switch her insurance company, before going to another office.

## 2025-04-16 NOTE — TELEPHONE ENCOUNTER
Patient stated she got everything handled with her insurance and everything is back to what it was.

## 2025-04-28 ENCOUNTER — OFFICE VISIT (OUTPATIENT)
Age: 60
End: 2025-04-28
Payer: COMMERCIAL

## 2025-04-28 ENCOUNTER — APPOINTMENT (OUTPATIENT)
Dept: RADIOLOGY | Facility: MEDICAL CENTER | Age: 60
End: 2025-04-28
Attending: FAMILY MEDICINE
Payer: COMMERCIAL

## 2025-04-28 VITALS
TEMPERATURE: 98.1 F | BODY MASS INDEX: 38.83 KG/M2 | WEIGHT: 185 LBS | SYSTOLIC BLOOD PRESSURE: 122 MMHG | DIASTOLIC BLOOD PRESSURE: 68 MMHG | HEIGHT: 58 IN | OXYGEN SATURATION: 96 % | HEART RATE: 83 BPM

## 2025-04-28 DIAGNOSIS — S83.241A ACUTE MEDIAL MENISCUS TEAR OF RIGHT KNEE, INITIAL ENCOUNTER: ICD-10-CM

## 2025-04-28 DIAGNOSIS — S83.241A ACUTE MEDIAL MENISCUS TEAR OF RIGHT KNEE, INITIAL ENCOUNTER: Primary | ICD-10-CM

## 2025-04-28 PROCEDURE — 99213 OFFICE O/P EST LOW 20 MIN: CPT | Performed by: FAMILY MEDICINE

## 2025-04-28 PROCEDURE — 73564 X-RAY EXAM KNEE 4 OR MORE: CPT

## 2025-04-28 RX ORDER — METHYLPREDNISOLONE 4 MG/1
TABLET ORAL
Qty: 21 EACH | Refills: 0 | Status: SHIPPED | OUTPATIENT
Start: 2025-04-28

## 2025-04-28 RX ORDER — MELOXICAM 15 MG/1
15 TABLET ORAL DAILY PRN
Qty: 30 TABLET | Refills: 3 | Status: SHIPPED | OUTPATIENT
Start: 2025-04-28

## 2025-04-28 NOTE — PROGRESS NOTES
"Name: Elvira Dejesus      : 1965      MRN: [id number]  Encounter Provider: Cuauhtemoc Choi DO  Encounter Date: 2025   Encounter department: [facility]  :  Assessment & Plan  Acute medial meniscus tear of right knee, initial encounter  Patient will go for x-ray.  Patient may use ice.  Patient will start meloxicam daily.  Medrol Dosepak.  Patient may need MRI if not seeing improvement    Orders:    XR knee 4+ vw right injury; Future    methylPREDNISolone 4 MG tablet therapy pack; Use as directed on package    meloxicam (MOBIC) 15 mg tablet; Take 1 tablet (15 mg total) by mouth daily as needed for moderate pain           History of Present Illness   Patient is here with right knee pain status post injury going downstairs causing inversion injury to the right ankle and pain to the right knee.  Patient with some back pain hitting right iron railing.  Patient with some effusion of the right knee.  No bruising or redness.  Patient using ice and heat.  Patient tried Tylenol naproxen and ibuprofen.    Knee Pain       Review of Systems   Constitutional: Negative.    HENT: Negative.     Eyes: Negative.    Respiratory: Negative.     Cardiovascular: Negative.    Gastrointestinal: Negative.    Endocrine: Negative.    Genitourinary: Negative.    Musculoskeletal:  Positive for arthralgias, back pain and gait problem.   Skin: Negative.    Allergic/Immunologic: Negative.    Hematological: Negative.    Psychiatric/Behavioral: Negative.         Objective   /68 (BP Location: Right arm, Patient Position: Sitting, Cuff Size: Large)   Pulse 83   Temp 98.1 °F (36.7 °C) (Temporal)   Ht 4' 10\" (1.473 m)   Wt 83.9 kg (185 lb)   LMP 2000 (Approximate)   SpO2 96%   BMI 38.67 kg/m²      Physical Exam  Vitals and nursing note reviewed.   Constitutional:       General: She is not in acute distress.     Appearance: She is not toxic-appearing or diaphoretic.   Musculoskeletal:         General: " Tenderness present.      Comments: Right knee medial joint line tenderness with palpation.  Patient has some pain over the lateral joint line.  Negative Lachman test.  Positive Apley test.  Effusion noted.   Neurological:      Mental Status: She is alert.

## 2025-04-28 NOTE — ASSESSMENT & PLAN NOTE
Patient will go for x-ray.  Patient may use ice.  Patient will start meloxicam daily.  Medrol Dosepak.  Patient may need MRI if not seeing improvement    Orders:    XR knee 4+ vw right injury; Future    methylPREDNISolone 4 MG tablet therapy pack; Use as directed on package    meloxicam (MOBIC) 15 mg tablet; Take 1 tablet (15 mg total) by mouth daily as needed for moderate pain

## 2025-04-30 ENCOUNTER — RESULTS FOLLOW-UP (OUTPATIENT)
Age: 60
End: 2025-04-30

## 2025-06-02 ENCOUNTER — VBI (OUTPATIENT)
Dept: ADMINISTRATIVE | Facility: OTHER | Age: 60
End: 2025-06-02

## 2025-06-02 NOTE — TELEPHONE ENCOUNTER
06/02/25 12:26 PM     Chart reviewed for CRC: Colonoscopy ; nothing is submitted to the patient's insurance at this time.     Rui Rosas MA   [facility]

## 2025-06-06 ENCOUNTER — TELEPHONE (OUTPATIENT)
Age: 60
End: 2025-06-06

## 2025-07-10 ENCOUNTER — OFFICE VISIT (OUTPATIENT)
Age: 60
End: 2025-07-10
Payer: COMMERCIAL

## 2025-07-10 VITALS
RESPIRATION RATE: 16 BRPM | SYSTOLIC BLOOD PRESSURE: 116 MMHG | TEMPERATURE: 98.4 F | OXYGEN SATURATION: 98 % | HEART RATE: 82 BPM | WEIGHT: 192 LBS | BODY MASS INDEX: 40.3 KG/M2 | DIASTOLIC BLOOD PRESSURE: 70 MMHG | HEIGHT: 58 IN

## 2025-07-10 DIAGNOSIS — J20.9 ACUTE BRONCHITIS, UNSPECIFIED ORGANISM: Primary | ICD-10-CM

## 2025-07-10 DIAGNOSIS — S83.241A ACUTE MEDIAL MENISCUS TEAR OF RIGHT KNEE, INITIAL ENCOUNTER: ICD-10-CM

## 2025-07-10 PROCEDURE — 99213 OFFICE O/P EST LOW 20 MIN: CPT | Performed by: FAMILY MEDICINE

## 2025-07-10 RX ORDER — DOXYCYCLINE 100 MG/1
100 CAPSULE ORAL EVERY 12 HOURS SCHEDULED
Qty: 14 CAPSULE | Refills: 0 | Status: SHIPPED | OUTPATIENT
Start: 2025-07-10 | End: 2025-07-17

## 2025-07-10 RX ORDER — METHYLPREDNISOLONE 4 MG/1
TABLET ORAL
Qty: 21 EACH | Refills: 0 | Status: SHIPPED | OUTPATIENT
Start: 2025-07-10

## 2025-07-11 PROBLEM — J20.9 ACUTE BRONCHITIS: Status: ACTIVE | Noted: 2025-07-11

## 2025-07-11 NOTE — PROGRESS NOTES
"Name: Elvira Dejesus      : 1965      MRN: [id number]  Encounter Provider: Ruddy Lozano MD  Encounter Date: 7/10/2025   Encounter department: [facility] PRIMARY CARE  :  Assessment & Plan  Acute medial meniscus tear of right knee, initial encounter         Acute bronchitis, unspecified organism  Add doxy and steroid taper. Discussed supportive care and return parameters.   Orders:    methylPREDNISolone 4 MG tablet therapy pack; Use as directed on package    doxycycline hyclate (VIBRAMYCIN) 100 mg capsule; Take 1 capsule (100 mg total) by mouth every 12 (twelve) hours for 7 days           History of Present Illness   Patient is a 61 y/o woman who presents c/o cough congestion sinus pressure wheezing no fevers chills nausea or vomiting.    Cough  Associated symptoms include wheezing.     Review of Systems   Constitutional: Negative.    HENT:  Positive for congestion and sinus pressure.    Eyes: Negative.    Respiratory:  Positive for cough and wheezing.    Cardiovascular: Negative.    Gastrointestinal: Negative.    Endocrine: Negative.    Genitourinary: Negative.    Musculoskeletal: Negative.    Allergic/Immunologic: Negative.    Neurological: Negative.    Hematological: Negative.    Psychiatric/Behavioral: Negative.     All other systems reviewed and are negative.      Objective   /70 (BP Location: Left arm, Patient Position: Sitting, Cuff Size: Large)   Pulse 82   Temp 98.4 °F (36.9 °C) (Temporal)   Resp 16   Ht 4' 10\" (1.473 m)   Wt 87.1 kg (192 lb)   LMP 2000 (Approximate)   SpO2 98%   BMI 40.13 kg/m²      Physical Exam  Constitutional:       General: She is not in acute distress.     Appearance: She is well-developed. She is not diaphoretic.   HENT:      Head: Normocephalic and atraumatic.      Right Ear: External ear normal.      Left Ear: External ear normal.      Nose: Nose normal.      Mouth/Throat:      Pharynx: No oropharyngeal exudate.     Eyes:      General:    "      Right eye: No discharge.         Left eye: No discharge.      Conjunctiva/sclera: Conjunctivae normal.      Pupils: Pupils are equal, round, and reactive to light.     Neck:      Thyroid: No thyromegaly.      Trachea: No tracheal deviation.     Cardiovascular:      Rate and Rhythm: Normal rate and regular rhythm.      Heart sounds: Normal heart sounds. No murmur heard.     No friction rub. No gallop.   Pulmonary:      Effort: Pulmonary effort is normal. No respiratory distress.      Breath sounds: Wheezing present.   Abdominal:      General: There is no distension.      Palpations: Abdomen is soft.      Tenderness: There is no abdominal tenderness. There is no guarding or rebound.     Musculoskeletal:         General: Normal range of motion.   Lymphadenopathy:      Cervical: No cervical adenopathy.     Skin:     General: Skin is warm.     Neurological:      Mental Status: She is alert and oriented to person, place, and time.      Cranial Nerves: No cranial nerve deficit.     Psychiatric:         Behavior: Behavior normal.         Thought Content: Thought content normal.         Judgment: Judgment normal.

## 2025-07-11 NOTE — ASSESSMENT & PLAN NOTE
Add doxy and steroid taper. Discussed supportive care and return parameters.   Orders:    methylPREDNISolone 4 MG tablet therapy pack; Use as directed on package    doxycycline hyclate (VIBRAMYCIN) 100 mg capsule; Take 1 capsule (100 mg total) by mouth every 12 (twelve) hours for 7 days

## 2025-07-17 ENCOUNTER — APPOINTMENT (OUTPATIENT)
Age: 60
End: 2025-07-17
Payer: COMMERCIAL

## 2025-07-17 ENCOUNTER — OFFICE VISIT (OUTPATIENT)
Age: 60
End: 2025-07-17
Payer: COMMERCIAL

## 2025-07-17 VITALS
HEIGHT: 58 IN | TEMPERATURE: 97.4 F | OXYGEN SATURATION: 96 % | SYSTOLIC BLOOD PRESSURE: 110 MMHG | HEART RATE: 89 BPM | BODY MASS INDEX: 40.43 KG/M2 | WEIGHT: 192.6 LBS | DIASTOLIC BLOOD PRESSURE: 72 MMHG

## 2025-07-17 DIAGNOSIS — J20.9 ACUTE BRONCHITIS, UNSPECIFIED ORGANISM: ICD-10-CM

## 2025-07-17 DIAGNOSIS — J20.9 ACUTE BRONCHITIS, UNSPECIFIED ORGANISM: Primary | ICD-10-CM

## 2025-07-17 PROCEDURE — 71046 X-RAY EXAM CHEST 2 VIEWS: CPT

## 2025-07-17 PROCEDURE — 99213 OFFICE O/P EST LOW 20 MIN: CPT | Performed by: FAMILY MEDICINE

## 2025-07-17 RX ORDER — GUAIFENESIN/DEXTROMETHORPHAN 100-10MG/5
5 SYRUP ORAL 3 TIMES DAILY PRN
Qty: 237 ML | Refills: 0 | Status: SHIPPED | OUTPATIENT
Start: 2025-07-17

## 2025-07-17 RX ORDER — CEFDINIR 300 MG/1
300 CAPSULE ORAL EVERY 12 HOURS SCHEDULED
Qty: 14 CAPSULE | Refills: 0 | Status: SHIPPED | OUTPATIENT
Start: 2025-07-17 | End: 2025-07-24

## 2025-07-17 RX ORDER — PREDNISONE 10 MG/1
TABLET ORAL
Qty: 33 TABLET | Refills: 0 | Status: SHIPPED | OUTPATIENT
Start: 2025-07-17

## 2025-07-18 NOTE — PROGRESS NOTES
"Name: Elvira Dejesus      : 1965      MRN: [id number]  Encounter Provider: Ruddy Lozano MD  Encounter Date: 2025   Encounter department: [facility] PRIMARY CARE  :  Assessment & Plan  Acute bronchitis, unspecified organism  Discussed treatment options. Will give longer steroid taper and stronger antibiotic in case symptoms do not continue to improve, will add cough meds. Ultimately if symptoms are persistent, due to the chronic, intermittent nature of her bronchitis episodes, recommend PFTs and CXR. Discussed supportive care and return parameters.   Orders:    predniSONE 10 mg tablet; Take 4 tabs daily x3d, then 3x3d, then 2x3d, then 1x3d, then 0.5x3d, then stop.    cefdinir (OMNICEF) 300 mg capsule; Take 1 capsule (300 mg total) by mouth every 12 (twelve) hours for 7 days    dextromethorphan-guaiFENesin (ROBITUSSIN DM)  mg/5 mL syrup; Take 5 mL by mouth 3 (three) times a day as needed for cough    Complete PFT with post bronchodilator; Future    XR chest pa and lateral; Future           History of Present Illness   Patient is a 59 y/o woman who presents c/o persistent cough, congestion, sinus pressure, wheezing, some slight improvement, no fevers chills nausea or vomiting.      Review of Systems   Constitutional: Negative.    HENT:  Positive for congestion and sinus pressure.    Eyes: Negative.    Respiratory:  Positive for cough and wheezing.    Cardiovascular: Negative.    Gastrointestinal: Negative.    Endocrine: Negative.    Genitourinary: Negative.    Musculoskeletal: Negative.    Allergic/Immunologic: Negative.    Neurological: Negative.    Hematological: Negative.    Psychiatric/Behavioral: Negative.     All other systems reviewed and are negative.      Objective   /72 (BP Location: Right arm, Patient Position: Sitting, Cuff Size: Large)   Pulse 89   Temp (!) 97.4 °F (36.3 °C) (Temporal)   Ht 4' 10\" (1.473 m)   Wt 87.4 kg (192 lb 9.6 oz)   LMP 2000 " (Approximate)   SpO2 96%   BMI 40.25 kg/m²      Physical Exam  Constitutional:       General: She is not in acute distress.     Appearance: She is well-developed. She is not diaphoretic.   HENT:      Head: Normocephalic and atraumatic.      Right Ear: External ear normal.      Left Ear: External ear normal.      Nose: Nose normal.      Mouth/Throat:      Pharynx: No oropharyngeal exudate.     Eyes:      General:         Right eye: No discharge.         Left eye: No discharge.      Conjunctiva/sclera: Conjunctivae normal.      Pupils: Pupils are equal, round, and reactive to light.     Neck:      Thyroid: No thyromegaly.      Trachea: No tracheal deviation.     Cardiovascular:      Rate and Rhythm: Normal rate and regular rhythm.      Heart sounds: Normal heart sounds. No murmur heard.     No friction rub. No gallop.   Pulmonary:      Effort: Pulmonary effort is normal. No respiratory distress.      Breath sounds: Wheezing present.   Abdominal:      General: There is no distension.      Palpations: Abdomen is soft.      Tenderness: There is no abdominal tenderness. There is no guarding or rebound.     Musculoskeletal:         General: Normal range of motion.   Lymphadenopathy:      Cervical: No cervical adenopathy.     Skin:     General: Skin is warm.     Neurological:      Mental Status: She is alert and oriented to person, place, and time.      Cranial Nerves: No cranial nerve deficit.     Psychiatric:         Behavior: Behavior normal.         Thought Content: Thought content normal.         Judgment: Judgment normal.

## 2025-07-18 NOTE — ASSESSMENT & PLAN NOTE
Discussed treatment options. Will give longer steroid taper and stronger antibiotic in case symptoms do not continue to improve, will add cough meds. Ultimately if symptoms are persistent, due to the chronic, intermittent nature of her bronchitis episodes, recommend PFTs and CXR. Discussed supportive care and return parameters.   Orders:    predniSONE 10 mg tablet; Take 4 tabs daily x3d, then 3x3d, then 2x3d, then 1x3d, then 0.5x3d, then stop.    cefdinir (OMNICEF) 300 mg capsule; Take 1 capsule (300 mg total) by mouth every 12 (twelve) hours for 7 days    dextromethorphan-guaiFENesin (ROBITUSSIN DM)  mg/5 mL syrup; Take 5 mL by mouth 3 (three) times a day as needed for cough    Complete PFT with post bronchodilator; Future    XR chest pa and lateral; Future

## 2025-07-28 ENCOUNTER — OFFICE VISIT (OUTPATIENT)
Age: 60
End: 2025-07-28
Payer: COMMERCIAL

## 2025-07-28 VITALS
OXYGEN SATURATION: 94 % | WEIGHT: 191 LBS | HEART RATE: 90 BPM | HEIGHT: 58 IN | SYSTOLIC BLOOD PRESSURE: 118 MMHG | BODY MASS INDEX: 40.09 KG/M2 | DIASTOLIC BLOOD PRESSURE: 72 MMHG | TEMPERATURE: 97.2 F

## 2025-07-28 DIAGNOSIS — S90.111A CONTUSION OF RIGHT GREAT TOE WITHOUT DAMAGE TO NAIL, INITIAL ENCOUNTER: ICD-10-CM

## 2025-07-28 DIAGNOSIS — R07.2 PRECORDIAL PAIN: Primary | ICD-10-CM

## 2025-07-28 DIAGNOSIS — H61.21 IMPACTED CERUMEN OF RIGHT EAR: ICD-10-CM

## 2025-07-28 DIAGNOSIS — R06.02 SHORTNESS OF BREATH: ICD-10-CM

## 2025-07-28 PROCEDURE — 99214 OFFICE O/P EST MOD 30 MIN: CPT | Performed by: FAMILY MEDICINE

## (undated) DEVICE — UNTHREADED GUIDE WIRE JFX: Brand: ASNIS

## (undated) DEVICE — HARMONIC ACE 5MM DIAMETER SHEARS 36CM SHAFT LENGTH + ADAPTIVE TISSUE TECHNOLOGY FOR USE WITH GENERATOR G11: Brand: HARMONIC ACE

## (undated) DEVICE — SUT VICRYL 0 UR-6 27 IN J603H

## (undated) DEVICE — DRAPE C-ARM X-RAY

## (undated) DEVICE — SUT VICRYL 3-0 SH 27 IN J416H

## (undated) DEVICE — SUT VICRYL 2-0 SH 27 IN UNDYED J417H

## (undated) DEVICE — SYRINGE 10ML LL

## (undated) DEVICE — GLOVE INDICATOR PI UNDERGLOVE SZ 7.5 BLUE

## (undated) DEVICE — CUFF TOURNIQUET 30 X 4 IN QUICK CONNECT DISP 1BLA

## (undated) DEVICE — 10FR FRAZIER SUCTION HANDLE: Brand: CARDINAL HEALTH

## (undated) DEVICE — ENDOPATH 5MM CURVED SCISSORS WITH MONOPOLAR CAUTERY: Brand: ENDOPATH

## (undated) DEVICE — CANNULATED DRILL BIT JFX: Brand: ASNIS

## (undated) DEVICE — SUT ETHILON 4-0 PS-2 18 IN 1667H

## (undated) DEVICE — GLOVE INDICATOR PI UNDERGLOVE SZ 8 BLUE

## (undated) DEVICE — STRETCH BANDAGE: Brand: CURITY

## (undated) DEVICE — ENDOPATH ETS-FLEX45 ARTICULATING ENDOSCOPIC LINEAR CUTTER, NO RELOAD: Brand: ENDOPATH

## (undated) DEVICE — SUT MONOCRYL 4-0 PS-2 27 IN Y426H

## (undated) DEVICE — DRAPE EQUIPMENT RF WAND

## (undated) DEVICE — ETS45 RELOAD STANDARD 45MM: Brand: ENDOPATH

## (undated) DEVICE — SCD SEQUENTIAL COMPRESSION COMFORT SLEEVE MEDIUM KNEE LENGTH: Brand: KENDALL SCD

## (undated) DEVICE — GLOVE SRG BIOGEL 6.5

## (undated) DEVICE — GLOVE PI ULTRA TOUCH SZ.7.5

## (undated) DEVICE — GLOVE PI ULTRA TOUCH SZ.7.0

## (undated) DEVICE — GLOVE SRG BIOGEL ECLIPSE 8

## (undated) DEVICE — CHLORAPREP HI-LITE 26ML ORANGE

## (undated) DEVICE — ENDOPATH ETS45 2.5MM RELOADS (VASCULAR/THIN): Brand: ENDOPATH

## (undated) DEVICE — TROCAR: Brand: KII® SLEEVE

## (undated) DEVICE — WEBRIL 6 IN UNSTERILE

## (undated) DEVICE — PADDING CAST 4 IN  COTTON STRL

## (undated) DEVICE — PENCIL ELECTROSURG E-Z CLEAN -0035H

## (undated) DEVICE — CURITY NON-ADHERENT STRIPS: Brand: CURITY

## (undated) DEVICE — TAPE CAST 4IN FIBERGLASS 4YD WHITE

## (undated) DEVICE — NEEDLE 18 G X 1 1/2

## (undated) DEVICE — TISSUE RETRIEVAL SYSTEM: Brand: INZII RETRIEVAL SYSTEM

## (undated) DEVICE — CANNULATED TAP JFX: Brand: ASNIS

## (undated) DEVICE — BETHLEHEM UNIVERSAL  MIONR EXT: Brand: CARDINAL HEALTH

## (undated) DEVICE — ALLENTOWN LAP CHOLE APP PACK: Brand: CARDINAL HEALTH

## (undated) DEVICE — ADHESIVE SKIN HIGH VISCOSITY EXOFIN 1ML

## (undated) DEVICE — PLUMEPEN PRO 10FT

## (undated) DEVICE — 2000CC GUARDIAN II: Brand: GUARDIAN

## (undated) DEVICE — TRAY FOLEY 16FR URIMETER SURESTEP

## (undated) DEVICE — 3000CC GUARDIAN II: Brand: GUARDIAN

## (undated) DEVICE — GLOVE SRG BIOGEL 7.5

## (undated) DEVICE — TROCARS: Brand: KII® BALLOON BLUNT TIP SYSTEM

## (undated) DEVICE — IRRIG ENDO FLO TUBING

## (undated) DEVICE — TROCAR: Brand: KII FIOS FIRST ENTRY

## (undated) DEVICE — INTENDED FOR TISSUE SEPARATION, AND OTHER PROCEDURES THAT REQUIRE A SHARP SURGICAL BLADE TO PUNCTURE OR CUT.: Brand: BARD-PARKER ® CARBON RIB-BACK BLADES

## (undated) DEVICE — INTENDED FOR TISSUE SEPARATION, AND OTHER PROCEDURES THAT REQUIRE A SHARP SURGICAL BLADE TO PUNCTURE OR CUT.: Brand: BARD-PARKER SAFETY BLADES SIZE 11, STERILE

## (undated) DEVICE — CAST PADDING 4 IN SYNTHETIC NON-STRL

## (undated) DEVICE — LIGAMAX 5 MM ENDOSCOPIC MULTIPLE CLIP APPLIER: Brand: LIGAMAX

## (undated) DEVICE — NEEDLE 25G X 1 1/2